# Patient Record
Sex: MALE | Race: WHITE | NOT HISPANIC OR LATINO | Employment: OTHER | URBAN - METROPOLITAN AREA
[De-identification: names, ages, dates, MRNs, and addresses within clinical notes are randomized per-mention and may not be internally consistent; named-entity substitution may affect disease eponyms.]

---

## 2017-01-06 ENCOUNTER — COMMUNICATION - HEALTHEAST (OUTPATIENT)
Dept: FAMILY MEDICINE | Facility: CLINIC | Age: 67
End: 2017-01-06

## 2017-01-06 DIAGNOSIS — E55.9 VITAMIN D DEFICIENCY: ICD-10-CM

## 2017-04-04 ENCOUNTER — RECORDS - HEALTHEAST (OUTPATIENT)
Dept: GENERAL RADIOLOGY | Facility: CLINIC | Age: 67
End: 2017-04-04

## 2017-04-04 ENCOUNTER — OFFICE VISIT - HEALTHEAST (OUTPATIENT)
Dept: FAMILY MEDICINE | Facility: CLINIC | Age: 67
End: 2017-04-04

## 2017-04-04 DIAGNOSIS — Z00.00 ROUTINE GENERAL MEDICAL EXAMINATION AT A HEALTH CARE FACILITY: ICD-10-CM

## 2017-04-04 DIAGNOSIS — E55.9 VITAMIN D DEFICIENCY: ICD-10-CM

## 2017-04-04 DIAGNOSIS — L91.8 SKIN TAG: ICD-10-CM

## 2017-04-04 DIAGNOSIS — Z12.5 ENCOUNTER FOR PROSTATE CANCER SCREENING: ICD-10-CM

## 2017-04-04 DIAGNOSIS — M25.569 PAIN IN UNSPECIFIED KNEE: ICD-10-CM

## 2017-04-04 DIAGNOSIS — M25.569 KNEE JOINT PAIN: ICD-10-CM

## 2017-04-04 LAB
CHOLEST SERPL-MCNC: 205 MG/DL
FASTING STATUS PATIENT QL REPORTED: YES
HDLC SERPL-MCNC: 43 MG/DL
LDLC SERPL CALC-MCNC: 122 MG/DL
PSA SERPL-MCNC: 3.5 NG/ML (ref 0–4.5)
TRIGL SERPL-MCNC: 198 MG/DL

## 2017-04-04 ASSESSMENT — MIFFLIN-ST. JEOR: SCORE: 1848.06

## 2017-04-26 ENCOUNTER — AMBULATORY - HEALTHEAST (OUTPATIENT)
Dept: FAMILY MEDICINE | Facility: CLINIC | Age: 67
End: 2017-04-26

## 2017-04-26 ENCOUNTER — COMMUNICATION - HEALTHEAST (OUTPATIENT)
Dept: FAMILY MEDICINE | Facility: CLINIC | Age: 67
End: 2017-04-26

## 2017-04-26 DIAGNOSIS — E78.5 HYPERLIPIDEMIA, UNSPECIFIED HYPERLIPIDEMIA TYPE: ICD-10-CM

## 2017-04-26 DIAGNOSIS — I10 HTN (HYPERTENSION): ICD-10-CM

## 2017-04-26 DIAGNOSIS — I10 ESSENTIAL HYPERTENSION, BENIGN: ICD-10-CM

## 2017-04-26 DIAGNOSIS — R35.1 NOCTURIA: ICD-10-CM

## 2017-07-31 ENCOUNTER — COMMUNICATION - HEALTHEAST (OUTPATIENT)
Dept: FAMILY MEDICINE | Facility: CLINIC | Age: 67
End: 2017-07-31

## 2017-07-31 DIAGNOSIS — I10 ESSENTIAL HYPERTENSION, BENIGN: ICD-10-CM

## 2018-01-20 ENCOUNTER — COMMUNICATION - HEALTHEAST (OUTPATIENT)
Dept: FAMILY MEDICINE | Facility: CLINIC | Age: 68
End: 2018-01-20

## 2018-01-20 DIAGNOSIS — I10 ESSENTIAL HYPERTENSION, BENIGN: ICD-10-CM

## 2018-02-26 ENCOUNTER — OFFICE VISIT - HEALTHEAST (OUTPATIENT)
Dept: FAMILY MEDICINE | Facility: CLINIC | Age: 68
End: 2018-02-26

## 2018-02-26 DIAGNOSIS — Z13.220 ENCOUNTER FOR SCREENING FOR LIPOID DISORDERS: ICD-10-CM

## 2018-02-26 DIAGNOSIS — Z00.00 ROUTINE GENERAL MEDICAL EXAMINATION AT A HEALTH CARE FACILITY: ICD-10-CM

## 2018-02-26 DIAGNOSIS — R09.82 POST-NASAL DRAINAGE: ICD-10-CM

## 2018-02-26 DIAGNOSIS — E55.9 VITAMIN D DEFICIENCY: ICD-10-CM

## 2018-02-26 DIAGNOSIS — Z23 NEED FOR PROPHYLACTIC VACCINATION AGAINST STREPTOCOCCUS PNEUMONIAE (PNEUMOCOCCUS): ICD-10-CM

## 2018-02-26 DIAGNOSIS — Z12.5 SCREENING FOR MALIGNANT NEOPLASM OF PROSTATE: ICD-10-CM

## 2018-02-26 LAB
ALBUMIN SERPL-MCNC: 3.5 G/DL (ref 3.5–5)
ALP SERPL-CCNC: 72 U/L (ref 45–120)
ALT SERPL W P-5'-P-CCNC: 30 U/L (ref 0–45)
ANION GAP SERPL CALCULATED.3IONS-SCNC: 10 MMOL/L (ref 5–18)
AST SERPL W P-5'-P-CCNC: 20 U/L (ref 0–40)
BILIRUB SERPL-MCNC: 0.8 MG/DL (ref 0–1)
BUN SERPL-MCNC: 24 MG/DL (ref 8–22)
CALCIUM SERPL-MCNC: 8.9 MG/DL (ref 8.5–10.5)
CHLORIDE BLD-SCNC: 108 MMOL/L (ref 98–107)
CHOLEST SERPL-MCNC: 187 MG/DL
CO2 SERPL-SCNC: 22 MMOL/L (ref 22–31)
CREAT SERPL-MCNC: 0.85 MG/DL (ref 0.7–1.3)
FASTING STATUS PATIENT QL REPORTED: YES
GFR SERPL CREATININE-BSD FRML MDRD: >60 ML/MIN/1.73M2
GLUCOSE BLD-MCNC: 103 MG/DL (ref 70–125)
HDLC SERPL-MCNC: 49 MG/DL
LDLC SERPL CALC-MCNC: 109 MG/DL
POTASSIUM BLD-SCNC: 4.6 MMOL/L (ref 3.5–5)
PROT SERPL-MCNC: 6.7 G/DL (ref 6–8)
PSA SERPL-MCNC: 3.6 NG/ML (ref 0–4.5)
SODIUM SERPL-SCNC: 140 MMOL/L (ref 136–145)
TRIGL SERPL-MCNC: 144 MG/DL

## 2018-02-26 ASSESSMENT — MIFFLIN-ST. JEOR: SCORE: 1861.21

## 2018-02-27 LAB
25(OH)D3 SERPL-MCNC: 25 NG/ML (ref 30–80)
25(OH)D3 SERPL-MCNC: 25 NG/ML (ref 30–80)

## 2018-04-11 ENCOUNTER — COMMUNICATION - HEALTHEAST (OUTPATIENT)
Dept: FAMILY MEDICINE | Facility: CLINIC | Age: 68
End: 2018-04-11

## 2018-04-11 DIAGNOSIS — R35.1 NOCTURIA: ICD-10-CM

## 2018-04-11 DIAGNOSIS — E78.5 HYPERLIPIDEMIA, UNSPECIFIED HYPERLIPIDEMIA TYPE: ICD-10-CM

## 2018-07-17 ENCOUNTER — COMMUNICATION - HEALTHEAST (OUTPATIENT)
Dept: FAMILY MEDICINE | Facility: CLINIC | Age: 68
End: 2018-07-17

## 2018-07-17 DIAGNOSIS — I10 ESSENTIAL HYPERTENSION, BENIGN: ICD-10-CM

## 2018-10-15 ENCOUNTER — OFFICE VISIT - HEALTHEAST (OUTPATIENT)
Dept: FAMILY MEDICINE | Facility: CLINIC | Age: 68
End: 2018-10-15
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
## 2018-11-12 ENCOUNTER — RECORDS - HEALTHEAST (OUTPATIENT)
Dept: ADMINISTRATIVE | Facility: OTHER | Age: 68
End: 2018-11-12

## 2018-12-03 ENCOUNTER — COMMUNICATION - HEALTHEAST (OUTPATIENT)
Dept: FAMILY MEDICINE | Facility: CLINIC | Age: 68
End: 2018-12-03

## 2019-01-06 ENCOUNTER — COMMUNICATION - HEALTHEAST (OUTPATIENT)
Dept: FAMILY MEDICINE | Facility: CLINIC | Age: 69
End: 2019-01-06

## 2019-01-06 DIAGNOSIS — I10 ESSENTIAL HYPERTENSION, BENIGN: ICD-10-CM

## 2019-03-25 ENCOUNTER — OFFICE VISIT - HEALTHEAST (OUTPATIENT)
Dept: FAMILY MEDICINE | Facility: CLINIC | Age: 69
End: 2019-03-25

## 2019-03-25 DIAGNOSIS — Z12.5 SCREENING FOR MALIGNANT NEOPLASM OF PROSTATE: ICD-10-CM

## 2019-03-25 DIAGNOSIS — R09.82 POST-NASAL DRAINAGE: ICD-10-CM

## 2019-03-25 DIAGNOSIS — E78.5 HYPERLIPIDEMIA, UNSPECIFIED HYPERLIPIDEMIA TYPE: ICD-10-CM

## 2019-03-25 DIAGNOSIS — R35.1 NOCTURIA: ICD-10-CM

## 2019-03-25 DIAGNOSIS — Z13.220 ENCOUNTER FOR SCREENING FOR LIPOID DISORDERS: ICD-10-CM

## 2019-03-25 DIAGNOSIS — I10 ESSENTIAL HYPERTENSION, BENIGN: ICD-10-CM

## 2019-03-25 DIAGNOSIS — L30.9 ECZEMA: ICD-10-CM

## 2019-03-25 DIAGNOSIS — Z12.11 SCREENING FOR COLON CANCER: ICD-10-CM

## 2019-03-25 DIAGNOSIS — Z23 VACCINE FOR DIPHTHERIA-TETANUS: ICD-10-CM

## 2019-03-25 DIAGNOSIS — Z00.00 ROUTINE GENERAL MEDICAL EXAMINATION AT A HEALTH CARE FACILITY: ICD-10-CM

## 2019-03-25 LAB
ALBUMIN SERPL-MCNC: 3.7 G/DL (ref 3.5–5)
ALP SERPL-CCNC: 74 U/L (ref 45–120)
ALT SERPL W P-5'-P-CCNC: 29 U/L (ref 0–45)
ANION GAP SERPL CALCULATED.3IONS-SCNC: 11 MMOL/L (ref 5–18)
AST SERPL W P-5'-P-CCNC: 20 U/L (ref 0–40)
BILIRUB SERPL-MCNC: 0.7 MG/DL (ref 0–1)
BUN SERPL-MCNC: 17 MG/DL (ref 8–28)
CALCIUM SERPL-MCNC: 9.2 MG/DL (ref 8.5–10.5)
CHLORIDE BLD-SCNC: 108 MMOL/L (ref 98–107)
CHOLEST SERPL-MCNC: 166 MG/DL
CO2 SERPL-SCNC: 24 MMOL/L (ref 22–31)
CREAT SERPL-MCNC: 0.89 MG/DL (ref 0.7–1.3)
FASTING STATUS PATIENT QL REPORTED: YES
GFR SERPL CREATININE-BSD FRML MDRD: >60 ML/MIN/1.73M2
GLUCOSE BLD-MCNC: 100 MG/DL (ref 70–125)
HDLC SERPL-MCNC: 39 MG/DL
LDLC SERPL CALC-MCNC: 100 MG/DL
POTASSIUM BLD-SCNC: 4.3 MMOL/L (ref 3.5–5)
PROT SERPL-MCNC: 7 G/DL (ref 6–8)
PSA SERPL-MCNC: 3.4 NG/ML (ref 0–6.5)
SODIUM SERPL-SCNC: 143 MMOL/L (ref 136–145)
TRIGL SERPL-MCNC: 134 MG/DL

## 2019-03-25 ASSESSMENT — MIFFLIN-ST. JEOR: SCORE: 1885.71

## 2019-04-25 ENCOUNTER — RECORDS - HEALTHEAST (OUTPATIENT)
Dept: ADMINISTRATIVE | Facility: OTHER | Age: 69
End: 2019-04-25

## 2019-09-17 ENCOUNTER — RECORDS - HEALTHEAST (OUTPATIENT)
Dept: GENERAL RADIOLOGY | Facility: CLINIC | Age: 69
End: 2019-09-17

## 2019-09-17 ENCOUNTER — OFFICE VISIT - HEALTHEAST (OUTPATIENT)
Dept: FAMILY MEDICINE | Facility: CLINIC | Age: 69
End: 2019-09-17

## 2019-09-17 DIAGNOSIS — L82.1 SEBORRHEIC KERATOSES: ICD-10-CM

## 2019-09-17 DIAGNOSIS — Z23 NEED FOR INFLUENZA VACCINATION: ICD-10-CM

## 2019-09-17 DIAGNOSIS — M25.561 RIGHT KNEE PAIN, UNSPECIFIED CHRONICITY: ICD-10-CM

## 2019-09-17 DIAGNOSIS — M25.561 PAIN IN RIGHT KNEE: ICD-10-CM

## 2019-09-23 ENCOUNTER — RECORDS - HEALTHEAST (OUTPATIENT)
Dept: ADMINISTRATIVE | Facility: OTHER | Age: 69
End: 2019-09-23

## 2019-10-02 ENCOUNTER — OFFICE VISIT - HEALTHEAST (OUTPATIENT)
Dept: FAMILY MEDICINE | Facility: CLINIC | Age: 69
End: 2019-10-02

## 2019-10-02 DIAGNOSIS — Z01.818 PREOP GENERAL PHYSICAL EXAM: ICD-10-CM

## 2019-10-02 DIAGNOSIS — S83.206D TEAR OF MENISCUS OF RIGHT KNEE AS CURRENT INJURY, UNSPECIFIED MENISCUS, UNSPECIFIED TEAR TYPE, SUBSEQUENT ENCOUNTER: ICD-10-CM

## 2019-10-02 DIAGNOSIS — I10 ESSENTIAL HYPERTENSION, BENIGN: ICD-10-CM

## 2019-10-03 ENCOUNTER — RECORDS - HEALTHEAST (OUTPATIENT)
Dept: ADMINISTRATIVE | Facility: OTHER | Age: 69
End: 2019-10-03

## 2019-10-03 LAB
ATRIAL RATE - MUSE: 68 BPM
DIASTOLIC BLOOD PRESSURE - MUSE: NORMAL
INTERPRETATION ECG - MUSE: NORMAL
P AXIS - MUSE: 23 DEGREES
PR INTERVAL - MUSE: 172 MS
QRS DURATION - MUSE: 140 MS
QT - MUSE: 434 MS
QTC - MUSE: 461 MS
R AXIS - MUSE: -39 DEGREES
SYSTOLIC BLOOD PRESSURE - MUSE: NORMAL
T AXIS - MUSE: 4 DEGREES
VENTRICULAR RATE- MUSE: 68 BPM

## 2019-10-14 ENCOUNTER — RECORDS - HEALTHEAST (OUTPATIENT)
Dept: ADMINISTRATIVE | Facility: OTHER | Age: 69
End: 2019-10-14

## 2019-10-29 ENCOUNTER — RECORDS - HEALTHEAST (OUTPATIENT)
Dept: ADMINISTRATIVE | Facility: OTHER | Age: 69
End: 2019-10-29

## 2019-12-06 ENCOUNTER — RECORDS - HEALTHEAST (OUTPATIENT)
Dept: ADMINISTRATIVE | Facility: OTHER | Age: 69
End: 2019-12-06

## 2020-01-14 ENCOUNTER — RECORDS - HEALTHEAST (OUTPATIENT)
Dept: ADMINISTRATIVE | Facility: OTHER | Age: 70
End: 2020-01-14

## 2020-02-17 ENCOUNTER — RECORDS - HEALTHEAST (OUTPATIENT)
Dept: ADMINISTRATIVE | Facility: OTHER | Age: 70
End: 2020-02-17

## 2020-03-10 ENCOUNTER — COMMUNICATION - HEALTHEAST (OUTPATIENT)
Dept: FAMILY MEDICINE | Facility: CLINIC | Age: 70
End: 2020-03-10

## 2020-03-10 DIAGNOSIS — E78.5 HYPERLIPIDEMIA, UNSPECIFIED HYPERLIPIDEMIA TYPE: ICD-10-CM

## 2020-03-10 DIAGNOSIS — I10 ESSENTIAL HYPERTENSION, BENIGN: ICD-10-CM

## 2020-03-10 DIAGNOSIS — R35.1 NOCTURIA: ICD-10-CM

## 2020-05-10 ENCOUNTER — COMMUNICATION - HEALTHEAST (OUTPATIENT)
Dept: SCHEDULING | Facility: CLINIC | Age: 70
End: 2020-05-10

## 2020-06-01 ENCOUNTER — RECORDS - HEALTHEAST (OUTPATIENT)
Dept: ADMINISTRATIVE | Facility: OTHER | Age: 70
End: 2020-06-01

## 2020-06-25 ENCOUNTER — RECORDS - HEALTHEAST (OUTPATIENT)
Dept: ADMINISTRATIVE | Facility: OTHER | Age: 70
End: 2020-06-25

## 2020-09-18 ENCOUNTER — OFFICE VISIT - HEALTHEAST (OUTPATIENT)
Dept: FAMILY MEDICINE | Facility: CLINIC | Age: 70
End: 2020-09-18

## 2020-09-18 DIAGNOSIS — Z12.5 SCREENING FOR MALIGNANT NEOPLASM OF PROSTATE: ICD-10-CM

## 2020-09-18 DIAGNOSIS — Z00.00 ROUTINE GENERAL MEDICAL EXAMINATION AT A HEALTH CARE FACILITY: ICD-10-CM

## 2020-09-18 DIAGNOSIS — Z13.220 ENCOUNTER FOR SCREENING FOR LIPOID DISORDERS: ICD-10-CM

## 2020-09-18 DIAGNOSIS — E55.9 VITAMIN D DEFICIENCY: ICD-10-CM

## 2020-09-18 DIAGNOSIS — Z23 NEED FOR INFLUENZA VACCINATION: ICD-10-CM

## 2020-09-18 DIAGNOSIS — E66.01 MORBID OBESITY (H): ICD-10-CM

## 2020-09-18 LAB
ALBUMIN SERPL-MCNC: 3.7 G/DL (ref 3.5–5)
ALP SERPL-CCNC: 72 U/L (ref 45–120)
ALT SERPL W P-5'-P-CCNC: 33 U/L (ref 0–45)
ANION GAP SERPL CALCULATED.3IONS-SCNC: 8 MMOL/L (ref 5–18)
AST SERPL W P-5'-P-CCNC: 22 U/L (ref 0–40)
BILIRUB SERPL-MCNC: 0.7 MG/DL (ref 0–1)
BUN SERPL-MCNC: 13 MG/DL (ref 8–28)
CALCIUM SERPL-MCNC: 8.7 MG/DL (ref 8.5–10.5)
CHLORIDE BLD-SCNC: 106 MMOL/L (ref 98–107)
CHOLEST SERPL-MCNC: 179 MG/DL
CO2 SERPL-SCNC: 26 MMOL/L (ref 22–31)
CREAT SERPL-MCNC: 0.82 MG/DL (ref 0.7–1.3)
FASTING STATUS PATIENT QL REPORTED: YES
GFR SERPL CREATININE-BSD FRML MDRD: >60 ML/MIN/1.73M2
GLUCOSE BLD-MCNC: 92 MG/DL (ref 70–125)
HDLC SERPL-MCNC: 45 MG/DL
LDLC SERPL CALC-MCNC: 95 MG/DL
POTASSIUM BLD-SCNC: 4.4 MMOL/L (ref 3.5–5)
PROT SERPL-MCNC: 6.7 G/DL (ref 6–8)
PSA SERPL-MCNC: 3.8 NG/ML (ref 0–6.5)
SODIUM SERPL-SCNC: 140 MMOL/L (ref 136–145)
TRIGL SERPL-MCNC: 196 MG/DL

## 2020-09-18 ASSESSMENT — MIFFLIN-ST. JEOR: SCORE: 1909.52

## 2020-09-21 LAB
25(OH)D3 SERPL-MCNC: 43.5 NG/ML (ref 30–80)
25(OH)D3 SERPL-MCNC: 43.5 NG/ML (ref 30–80)

## 2020-12-07 ENCOUNTER — COMMUNICATION - HEALTHEAST (OUTPATIENT)
Dept: FAMILY MEDICINE | Facility: CLINIC | Age: 70
End: 2020-12-07

## 2020-12-07 DIAGNOSIS — I10 ESSENTIAL HYPERTENSION, BENIGN: ICD-10-CM

## 2020-12-07 DIAGNOSIS — E78.5 HYPERLIPIDEMIA, UNSPECIFIED HYPERLIPIDEMIA TYPE: ICD-10-CM

## 2020-12-07 DIAGNOSIS — R35.1 NOCTURIA: ICD-10-CM

## 2020-12-08 RX ORDER — ATORVASTATIN CALCIUM 40 MG/1
TABLET, FILM COATED ORAL
Qty: 90 TABLET | Refills: 2 | Status: SHIPPED | OUTPATIENT
Start: 2020-12-08 | End: 2021-09-03

## 2020-12-08 RX ORDER — METOPROLOL SUCCINATE 50 MG/1
50 TABLET, EXTENDED RELEASE ORAL DAILY
Qty: 90 TABLET | Refills: 3 | Status: SHIPPED | OUTPATIENT
Start: 2020-12-08 | End: 2021-12-01

## 2020-12-08 RX ORDER — DOXAZOSIN 4 MG/1
TABLET ORAL
Qty: 90 TABLET | Refills: 2 | Status: SHIPPED | OUTPATIENT
Start: 2020-12-08 | End: 2021-09-03

## 2021-04-05 ENCOUNTER — OFFICE VISIT - HEALTHEAST (OUTPATIENT)
Dept: FAMILY MEDICINE | Facility: CLINIC | Age: 71
End: 2021-04-05

## 2021-04-05 DIAGNOSIS — L30.9 ECZEMA, UNSPECIFIED TYPE: ICD-10-CM

## 2021-04-05 DIAGNOSIS — B35.1 DERMATOPHYTOSIS OF NAIL: ICD-10-CM

## 2021-04-05 RX ORDER — BETAMETHASONE VALERATE 1.2 MG/G
CREAM TOPICAL 2 TIMES DAILY
Qty: 45 G | Refills: 1 | Status: SHIPPED | OUTPATIENT
Start: 2021-04-05 | End: 2022-12-08

## 2021-04-05 RX ORDER — CICLOPIROX 80 MG/ML
SOLUTION TOPICAL
Qty: 6.6 ML | Refills: 2 | Status: SHIPPED | OUTPATIENT
Start: 2021-04-05 | End: 2021-07-04

## 2021-05-27 NOTE — PROGRESS NOTES
Assessment and Plan:        1. Routine general medical examination at a health care facility  - Comprehensive Metabolic Panel  Comes placed for routine physical exam.  Discussed continuing with physical activity as well as medication about his diet.  We discussed need to lose weight.  Discussed the various issues that he has.  Labs were ordered and immunization were ordered also.  Did order for colonoscopy since he is due and also having some intermittent rectal bleed due to hemorrhoids.  Discussed ways that he can help to lessen the hemorrhoid.  He will continue to use over-the-counter medication.    2. Encounter for screening for lipoid disorders  - Lipid Cascade, RANDOM    3. Screening for malignant neoplasm of prostate  - PSA (Prostatic-Specific Antigen), Annual Screen  He has had a prior increase in his PSA and has biopsy of the prostate which was normal.  This was in 2014.  Currently having mild nocturia.  We will recheck his PSA and consider referral depending on the results.  4. Hyperlipidemia, unspecified hyperlipidemia type  - atorvastatin (LIPITOR) 40 MG tablet; Take 1 tablet (40 mg total) by mouth at bedtime.  Dispense: 90 tablet; Refill: 3    5. Eczema  - betamethasone valerate (VALISONE) 0.1 % cream; Apply topically 2 (two) times a day.  Dispense: 45 g; Refill: 1    6. Urinary Frequency More Than Twice At Night (Nocturia)  - doxazosin (CARDURA) 4 MG tablet; Take 1 tablet (4 mg total) by mouth at bedtime.  Dispense: 90 tablet; Refill: 3    7. Post-nasal drainage  - fluticasone (FLONASE) 50 mcg/actuation nasal spray; 2 sprays into each nostril daily.  Dispense: 16 g; Refill: 1  - amoxicillin (AMOXIL) 875 MG tablet; Take 1 tablet (875 mg total) by mouth 2 (two) times a day for 10 days.  Dispense: 20 tablet; Refill: 0  He has a runny nose and postnasal drip which is giving him some cough at this time.  This is more than 10 days known think that he does need to have antibiotics management for this time.   Encouraged him to continue to use his Flonase as well as allergy medications.  8. Benign Essential Hypertension  - metoprolol succinate (TOPROL XL) 50 MG 24 hr tablet; Take 1 tablet (50 mg total) by mouth daily.  Dispense: 90 tablet; Refill: 3  Blood pressure is well controlled at this time we will continue with his medications.  9. Screening for colon cancer  - Ambulatory referral for Colonoscopy  Colonoscopy is ordered last colonoscopy done 10 years ago was normal.  He also has some hemorrhoid and the colonoscopy will also evaluate for that.  10. Vaccine for diphtheria-tetanus  - Td, Preservative Free (green label)  Due for tetanus shot which will be updated today.      The patient's current medical problems were reviewed.    I have had an Advance Directives discussion with the patient.  The following high BMI interventions were performed this visit: encouragement to exercise, weight monitoring and prescribed dietary intake  The following health maintenance schedule was reviewed with the patient and provided in printed form in the after visit summary:   Health Maintenance   Topic Date Due     ZOSTER VACCINES (2 of 3) 04/14/2014     HYPERTENSION FOLLOW-UP  10/04/2017     TD 18+ HE  03/04/2018     INFLUENZA VACCINE RULE BASED (1) 08/01/2018     FALL RISK ASSESSMENT  02/26/2019     COLONOSCOPY  09/28/2019     ADVANCE DIRECTIVES DISCUSSED WITH PATIENT  02/26/2023     PNEUMOCOCCAL POLYSACCHARIDE VACCINE AGE 65 AND OVER  Completed     PNEUMOCOCCAL CONJUGATE VACCINE FOR ADULTS (PCV13 OR PREVNAR)  Completed        Subjective:   Chief Complaint: Sharad Yi is an 69 y.o. male here for an Annual Wellness visit.   HPI: Comes in today for his annual wellness visit.  He has remained stable on doing well.  He does exercise intermittently with a lot of walking.  He denies any major concerns but has been having intermittent rectal bleed with hemorrhoids.  This is intermittent and not very bothersome.  He does use a  prescription for that.  He does have an multiple general medical concerns stable at this time this includes hypertension ,hyperlipidemia, he has been obese, has a high PSA he has had a biopsy of the prostate done which was negative.  He does have nocturia which he noted is minimal.  Noted intermittent incontinence very minor.  He does have some increased weight which he is working currently.  He also has some cough as well as sinus congestion and runny nose.  This is making him have some cough that is becoming more pronounced.  He denied any chest pain or shortness of breath.  He does note no wheezing.  Cough is said to be productive and worse at night time.    Review of Systems   Constitutional:Denied any fatigue no fevers no chills.  Has good appetite.  HEENT: Does not have any neck pain.  No difficulty swallowing.  As history.  Respiratory: There is no cough.  No chest wall pain.  Cardiovascular: Denied chest pain shortness of breath or palpitations.  There is no notable lower extremity swelling.    Gastrointestinal: Denies nausea vomiting.  No abdominal pain no diarrhea or constipation.  Endocrine:Has no sensitivity to cold or heat.  Denied undue thirst.   Genitourinary:Has no urinary symptoms, no nocturia.  Musculoskeletal: There is no musculoskeletal pain and swelling.    Skin: He does not have any rashes.   Allergic/Immunologic: Negative.   Neurological: No Numbness  Hematological: Negative.   Psychiatric/Behavioral: No anxiety or depression symptoms.      Please see above.  The rest of the review of systems are negative for all systems.    Patient Care Team:  Jase Rogers MD as PCP - General  Jase Rogers MD     Patient Active Problem List   Diagnosis     Onychomycosis     Obesity     Hyperlipidemia     Benign Essential Hypertension     Urinary Frequency More Than Twice At Night (Nocturia)     Sebaceous Cyst     Kidney cysts     Routine general medical examination at a [facility]  care facility     Elevated PSA     Post-nasal drainage     No past medical history on file.   Past Surgical History:   Procedure Laterality Date     [place] APPENDECTOMY      Description: Appendectomy;  Recorded: 02/05/2013;      Family History   Problem Relation Age of Onset     No Medical Problems Mother      Prostate cancer Father 57     Heart disease Father 57     Diabetes Father      No Medical Problems Sister      No Medical Problems Brother       Social History     Socioeconomic History     Marital status:      Spouse name: Not on file     Number of children: Not on file     Years of education: Not on file     Highest education level: Not on file   Occupational History     Not on file   Social Needs     Financial resource strain: Not on file     Food insecurity:     Worry: Not on file     Inability: Not on file     Transportation needs:     Medical: Not on file     Non-medical: Not on file   Tobacco Use     Smoking status: Former Smoker     Smokeless tobacco: Never Used   Substance and Sexual Activity     Alcohol use: Yes     Alcohol/week: 3.6 oz     Types: 6 Cans of beer per week     Drug use: No     Sexual activity: Yes     Partners: Female   Lifestyle     Physical activity:     Days per week: Not on file     Minutes per session: Not on file     Stress: Not on file   Relationships     Social connections:     Talks on phone: Not on file     Gets together: Not on file     Attends Congregation service: Not on file     Active member of club or organization: Not on file     Attends meetings of clubs or organizations: Not on file     Relationship status: Not on file     Intimate partner violence:     Fear of current or ex partner: Not on file     Emotionally abused: Not on file     Physically abused: Not on file     Forced sexual activity: Not on file   Other Topics Concern     Not on file   Social History Narrative     Not on file      Current Outpatient Medications   Medication Sig Dispense Refill      "atorvastatin (LIPITOR) 40 MG tablet Take 1 tablet (40 mg total) by mouth at bedtime. 90 tablet 3     betamethasone valerate (VALISONE) 0.1 % cream Apply topically 2 (two) times a day. 45 g 0     doxazosin (CARDURA) 4 MG tablet Take 1 tablet (4 mg total) by mouth at bedtime. 90 tablet 3     fluticasone (FLONASE) 50 mcg/actuation nasal spray 2 sprays into each nostril daily. 16 g 1     metoprolol succinate (TOPROL XL) 50 MG 24 hr tablet Take 1 tablet (50 mg total) by mouth daily. 90 tablet 0     No current facility-administered medications for this visit.       Objective:   Vital Signs:   Visit Vitals  /86 (Patient Site: Left Arm, Patient Position: Sitting, Cuff Size: Adult Large)   Pulse 76   Ht 5' 8\" (1.727 m)   Wt (!) 256 lb (116.1 kg)   BMI 38.92 kg/m         VisionScreening:  No exam data present     PHYSICAL EXAM  General Appearance: Alert, cooperative, no distress, appears stated age  Head: Normocephalic, without obvious abnormality, atraumatic  Eyes: PERRL, conjunctiva/corneas clear, EOM's intact  Ears: Normal TM's and external ear canals, both ears  Nose: Nares normal, septum midline,mucosa normal, mild drainage  Throat: Lips, mucosa, and tongue normal; teeth and gums normal, some postnasal drip noted.  Neck: Supple, symmetrical, trachea midline, no adenopathy;  thyroid: not enlarged, symmetric, no tenderness/mass/nodules; no carotid bruit or JVD  Back: Symmetric, no curvature, ROM normal, no CVA tenderness  Lungs: Clear to auscultation bilaterally, respirations unlabored  Heart: Regular rate and rhythm, S1 and S2 normal, no murmur, rub, or gallop,  Abdomen: Soft, non-tender, bowel sounds active all four quadrants,  no masses, no organomegaly  Musculoskeletal: Normal range of motion. No joint swelling or deformity.   Extremities: Extremities normal, atraumatic, no cyanosis or edema  Skin: Skin color, texture, turgor normal, no rashes or lesions  Lymph nodes: Cervical, supraclavicular, and axillary nodes " normal  Neurologic: He is alert. He has normal reflexes.   Psychiatric: He has a normal mood and affect.       Assessment Results 3/25/2019   Activities of Daily Living No help needed   Instrumental Activities of Daily Living No help needed   Mini Cog Total Score 5   Some recent data might be hidden     A Mini-Cog score of 0-2 suggests the possibility of dementia, score of 3-5 suggests no dementia    Identified Health Risks:     The patient was counseled and encouraged to consider modifying their diet and eating habits. He was provided with information on recommended healthy diet options.  Information on urinary incontinence and treatment options given to patient.  Information regarding advance directives (living [place]), including where he can download the appropriate form, was provided to the patient via the AVS.

## 2021-05-30 VITALS — WEIGHT: 247.7 LBS | BODY MASS INDEX: 37.54 KG/M2 | HEIGHT: 68 IN

## 2021-05-31 VITALS — BODY MASS INDEX: 37.98 KG/M2 | HEIGHT: 68 IN | WEIGHT: 250.6 LBS

## 2021-06-01 VITALS — BODY MASS INDEX: 39.65 KG/M2 | WEIGHT: 260.8 LBS

## 2021-06-01 NOTE — PROGRESS NOTES
Assessment/Plan:        Diagnoses and all orders for this visit:    Right knee pain, unspecified chronicity  -     XR Knee Right Plus [place]rise VW; Future; Expected date: 09/17/2019  -     Ambulatory referral to Orthopedics    Need for influenza vaccination  -     Influenza High Dose, Seasonal 65+ yrs    Seborrheic keratoses  I did order for an x-ray to compare it with the previous x-ray done for the same right knee in 2017 and which was reported at that time to show some arthritis.  Though the area that he was concerned about is lower than the knee joint I think that it is possible that he might be having some soft tissue injury or having the arthritis.  It is also possible that he is having tendinitis around the area that he is concerned about.  I did also refer him to see the orthopedic doctor for further evaluation and management.  I think that the concern for skin that he has is a seborrhea and he will just watch it for now.  We will have him follow-up if not improved.        Subjective:    Patient ID: Sharad Yi is a 69 y.o. male.    Knee Pain    The incident occurred more than 1 week ago (has been going for more than 5 months. Had a different pain to same knee about a year ago. Managed conservatively.). There was no injury mechanism. The pain is present in the right knee (pain noted on the anterior lower knee/ upper ). The quality of the pain is described as aching and shooting. The pain is moderate. The pain has been worsening since onset. Pertinent negatives include no muscle weakness, numbness or tingling. Associated symptoms comments: Able to bear weight but painful especially going up and downstairs.. He reports no foreign bodies present. The symptoms are aggravated by movement and weight bearing. Treatments tried: saw a chiropractor and has been doing manipulations which helped the posterior pain.   He also has skin concerns noted on the right cheek.  Had noted a discoloration there which is not  itchy or painful.  He does not think that he has seen that in the past and wanted to have it checked.    The following portions of the patient's history were reviewed and updated as appropriate: allergies, current medications, past family history, past medical history, past social history, past surgical history and problem list.    Review of Systems   Constitutional: Negative.    HENT: Negative.    Cardiovascular: Negative for leg swelling.   Musculoskeletal: Negative for back pain, gait problem and joint swelling.   Skin: Positive for color change. Negative for rash.   Neurological: Negative for tingling, numbness and headaches.     Vitals:    09/17/19 0906   BP: 132/86   Pulse: 67   SpO2: 95%   Weight: (!) 258 lb (117 kg)           Objective:    Physical Exam   Constitutional: He appears well-developed and well-nourished. No distress.   HENT:   Head: Normocephalic.   Neck: Normal range of motion.   Cardiovascular: Intact distal pulses.   Pulmonary/Chest: Effort normal.   Musculoskeletal:        Right knee: He exhibits normal range of motion, no swelling and no bony tenderness. No tenderness found.   The examination was essentially he was not noting pain inferior to the knee joint space by about 4 cm which was nontender.   Neurological: He is alert.   Skin:   He has a small skin lesion noted on the lower right cheek which is a consistent with a seborrheic keratosis.  Also noted one superior to the lower one.

## 2021-06-01 NOTE — PROGRESS NOTES
Preoperative Exam    Scheduled Procedure:Right knee Meniscus Repair  Surgery Date:  10/03   Surgery Location: [facility], fax [phone]    Surgeon:      Assessment/Plan:      1. Preop general physical exam  - Electrocardiogram Perform and Read    2. Tear of meniscus of right knee as current injury, unspecified meniscus, unspecified tear type, subsequent encounter    3. Benign Essential Hypertension  Controlled and stable on medications        Surgical Procedure Risk: Low (reported cardiac risk generally < 1%)  Have you had prior anesthesia?: Yes  Have you or any family members had a previous anesthesia reaction:  No  Do you or any family members have a history of a clotting or bleeding disorder?: No  Cardiac Risk Assessment: no increased risk for major cardiac complications, has history of Hypertension that is controlled on medication.  Has no history of cardiovascular disease.  He does have more than 4 metabolic equivalents.  EKG done today was normal sinus rhythm.    APPROVAL GIVEN to proceed with proposed procedure, without further diagnostic evaluation      Functional Status: Independent  Patient plans to recover at home with family.     Subjective:      Sharad Yi is a 69 y.o. male who presents for a preoperative consultation.   He has a right knee meniscal tear that has been evaluated and needs to be repaired.  He is continued to have some pain due to the tear.  He is prepped to have surgery to clean out the knee.  He has had surgeries in the past with no complications.  Had appendectomy with no complications.  He does have hypertension which is being managed as well as hyperlipidemia.  He otherwise is feeling well.      Review of Systems   Constitutional:Denied any fatigue no fevers no chills.  Has good appetite.  HEENT: Does not have any neck pain.  No difficulty swallowing denies having any postnasal drips.    Respiratory: There is no cough.  No chest wall  pain.  Cardiovascular: Denied chest pain shortness of breath or palpitations.  There is no notable lower extremity swelling.    Gastrointestinal: Denies nausea vomiting.  No abdominal pain no diarrhea or constipation.  Endocrine:Has no sensitivity to cold or heat.  Denied undue thirst.   Genitourinary:Has no urinary symptoms, no nocturia.  Musculoskeletal: There is musculoskeletal pain on the right knee and swelling in the right lower extremity.    Skin: He does not have any rashes.  But he does have lesions of perioral dermatitis.  Allergic/Immunologic: Negative.   Neurological: No Numbness  Hematological: Negative.   Psychiatric/Behavioral: No anxiety or depression symptoms.    All other systems reviewed and are negative, other than those listed in the HPI.    Pertinent History  Do you have difficulty breathing or chest pain after walking up a flight of stairs: No  History of obstructive sleep apnea: No  Steroid use in the last 6 months: No  Frequent Aspirin/NSAID use: Yes: IBU on and off   Prior Blood Transfusion: No  Prior Blood Transfusion Reaction: No  If for some reason prior to, during or after the procedure, if it is medically indicated, would you be willing to have a blood transfusion?:  There is no transfusion refusal.    Current Outpatient Medications   Medication Sig Dispense Refill     atorvastatin (LIPITOR) 40 MG tablet Take 1 tablet (40 mg total) by mouth at bedtime. 90 tablet 3     betamethasone valerate (VALISONE) 0.1 % cream Apply topically 2 (two) times a day. 45 g 1     doxazosin (CARDURA) 4 MG tablet Take 1 tablet (4 mg total) by mouth at bedtime. 90 tablet 3     fluticasone (FLONASE) 50 mcg/actuation nasal spray 2 sprays into each nostril daily. 16 g 1     meloxicam (MOBIC) 15 MG tablet TK 1 T PO D WF  0     metoprolol succinate (TOPROL XL) 50 MG 24 hr tablet Take 1 tablet (50 mg total) by mouth daily. 90 tablet 3     No current facility-administered medications for this visit.         No  Known Allergies    Patient Active Problem List   Diagnosis     Onychomycosis     Obesity     Hyperlipidemia     Benign Essential Hypertension     Urinary Frequency More Than Twice At Night (Nocturia)     Sebaceous Cyst     Kidney cysts     Routine general medical examination at a health care facility     Elevated PSA     Post-nasal drainage       No past medical history on file.    Past Surgical History:   Procedure Laterality Date     [place] APPENDECTOMY      Description: Appendectomy;  Recorded: 02/05/2013;       Social History     Socioeconomic History     Marital status:      Spouse name: Not on file     Number of children: Not on file     Years of education: Not on file     Highest education level: Not on file   Occupational History     Not on file   Social Needs     Financial resource strain: Not on file     Food insecurity:     Worry: Not on file     Inability: Not on file     Transportation needs:     Medical: Not on file     Non-medical: Not on file   Tobacco Use     Smoking status: Former Smoker     Smokeless tobacco: Never Used   Substance and Sexual Activity     Alcohol use: Yes     Alcohol/week: 6.0 standard drinks     Types: 6 Cans of beer per week     Drug use: No     Sexual activity: Yes     Partners: Female   Lifestyle     Physical activity:     Days per week: Not on file     Minutes per session: Not on file     Stress: Not on file   Relationships     Social connections:     Talks on phone: Not on file     Gets together: Not on file     Attends [employer] service: Not on file     Active member of club or organization: Not on file     Attends meetings of clubs or organizations: Not on file     Relationship status: Not on file     Intimate partner violence:     Fear of current or ex partner: Not on file     Emotionally abused: Not on file     Physically abused: Not on file     Forced sexual activity: Not on file   Other Topics Concern     Not on file   Social History Narrative     Not on file        Patient Care Team:  Jase Rogers MD as PCP - General  Jase Rogers MD Anene, Ositadinma [place], MD as Assigned PCP          Objective:     Vitals:    10/02/19 1040   BP: 132/84   Pulse: 70   Resp: 22   SpO2: 97%   Weight: (!) 250 lb (113.4 kg)       Physical Exam:  General Appearance: Alert, cooperative, no distress, appears stated age mildly obese.  Head: Normocephalic, without obvious abnormality, atraumatic  Eyes: PERRL, conjunctiva/corneas clear, EOM's intact  Ears: Normal TM's and external ear canals, both ears  Nose: Nares normal, septum midline,mucosa normal, no drainage  Throat: Lips, mucosa, and tongue normal; teeth and gums normal  Neck: Supple, symmetrical, trachea midline, no adenopathy;  thyroid: not enlarged, symmetric, no tenderness.  Back: Symmetric, no curvature, ROM normal, no CVA tenderness  Lungs: Clear to auscultation bilaterally, respirations unlabored  Heart: Regular rate and rhythm, S1 and S2 normal, no murmur, rub, or gallop,  Abdomen: Soft, non-tender, bowel sounds active all four quadrants,  no masses, no organomegaly  Musculoskeletal: Normal range of motion.  Mild swelling noted on the right knee with mild discomfort on palpation.   Extremities: Extremities normal, atraumatic.  Skin: Skin color, texture, turgor normal, no rashes , has perioral dermatitis noted.  Lymph nodes: Cervical, supraclavicular, and axillary nodes normal  Neurologic: He is alert. He has normal reflexes.   Psychiatric: He has a normal mood and affect.       Labs:  Recent Results (from the past 24 hour(s))   Electrocardiogram Perform and Read    Collection Time: 10/02/19 11:09 AM   Result Value Ref Range    SYSTOLIC BLOOD PRESSURE      DIASTOLIC BLOOD PRESSURE      VENTRICULAR RATE 68 BPM    ATRIAL RATE 68 BPM    P-R INTERVAL 172 ms    QRS DURATION 140 ms    Q-T INTERVAL 434 ms    QTC CALCULATION (BEZET) 461 ms    P Axis 23 degrees    R AXIS -39 degrees    T AXIS 4 degrees     MUSE DIAGNOSIS       Normal sinus rhythm  Left axis deviation  Right bundle branch block  Abnormal ECG  No previous ECGs available         Immunization History   Administered Date(s) Administered     Influenza high dose,seasonal,PF, 65+ yrs 10/13/2016, 10/09/2017, 09/17/2019     Influenza,seasonal,quad inj =/> 6months 10/06/2015     Pneumo Conj 13-V (2010&after) 10/13/2016     Pneumo Polysac 23-V 02/26/2018     Td, adult adsorbed, PF 03/25/2019     Tdap 03/04/2008     ZOSTER, LIVE 02/17/2014           Electronically signed by Jase Rogers MD 10/02/19 10:39 AM

## 2021-06-02 VITALS — HEIGHT: 68 IN | BODY MASS INDEX: 38.8 KG/M2 | WEIGHT: 256 LBS

## 2021-06-03 VITALS
DIASTOLIC BLOOD PRESSURE: 86 MMHG | BODY MASS INDEX: 39.23 KG/M2 | OXYGEN SATURATION: 95 % | SYSTOLIC BLOOD PRESSURE: 132 MMHG | HEART RATE: 67 BPM | WEIGHT: 258 LBS

## 2021-06-03 VITALS
SYSTOLIC BLOOD PRESSURE: 132 MMHG | OXYGEN SATURATION: 97 % | RESPIRATION RATE: 22 BRPM | BODY MASS INDEX: 38.01 KG/M2 | HEART RATE: 70 BPM | WEIGHT: 250 LBS | DIASTOLIC BLOOD PRESSURE: 84 MMHG

## 2021-06-04 VITALS
SYSTOLIC BLOOD PRESSURE: 138 MMHG | HEIGHT: 68 IN | HEART RATE: 72 BPM | DIASTOLIC BLOOD PRESSURE: 86 MMHG | BODY MASS INDEX: 39.86 KG/M2 | TEMPERATURE: 98 F | WEIGHT: 263 LBS

## 2021-06-05 VITALS
WEIGHT: 260.9 LBS | HEART RATE: 72 BPM | DIASTOLIC BLOOD PRESSURE: 80 MMHG | SYSTOLIC BLOOD PRESSURE: 120 MMHG | BODY MASS INDEX: 40.26 KG/M2 | OXYGEN SATURATION: 96 %

## 2021-06-06 NOTE — TELEPHONE ENCOUNTER
Refill Approved    Rx renewed per Medication Renewal Policy. Medication was last renewed on 3/25/19.    Claudia Justice, [facility] Triage/Med Refill 3/12/2020     Requested Prescriptions   Pending Prescriptions Disp Refills     doxazosin (CARDURA) 4 MG tablet [Pharmacy Med Name: DOXAZOSIN 4MG TABLETS] 90 tablet 3     Sig: TAKE 1 TABLET(4 MG) BY MOUTH AT BEDTIME       Alpha Blockers Refill Protocol  Passed - 3/10/2020  5:00 AM        Passed - PCP or prescribing provider visit in past 12 months       Last office visit with prescriber/PCP: 9/17/2019 Jase Rogers MD OR same dept: 9/17/2019 Jase Rogers MD OR same specialty: 9/17/2019 Jase Rogers MD  Last physical: 10/2/2019 Last MTM visit: Visit date not found   Next visit within 3 mo: Visit date not found  Next physical within 3 mo: Visit date not found  Prescriber OR PCP: Jase Rogers MD  Last diagnosis associated with med order: 1. Urinary Frequency More Than Twice At Night (Nocturia)  - doxazosin (CARDURA) 4 MG tablet [Pharmacy Med Name: DOXAZOSIN 4MG TABLETS]; TAKE 1 TABLET(4 MG) BY MOUTH AT BEDTIME  Dispense: 90 tablet; Refill: 3    2. Hyperlipidemia, unspecified hyperlipidemia type  - atorvastatin (LIPITOR) 40 MG tablet [Pharmacy Med Name: ATORVASTATIN 40MG TABLETS]; TAKE 1 TABLET(40 MG) BY MOUTH AT BEDTIME  Dispense: 90 tablet; Refill: 3    3. Benign Essential Hypertension  - metoprolol succinate (TOPROL-XL) 50 MG 24 hr tablet [Pharmacy Med Name: METOPROLOL ER SUCCINATE 50MG TABS]; TAKE 1 TABLET(50 MG) BY MOUTH DAILY  Dispense: 90 tablet; Refill: 3    If protocol passes may refill for 12 months if within 3 months of last provider visit (or a total of 15 months).             Passed - Blood pressure filed in past 12 months     BP Readings from Last 1 Encounters:   10/02/19 132/84                atorvastatin (LIPITOR) 40 MG tablet [Pharmacy Med Name: ATORVASTATIN 40MG TABLETS] 90 tablet 3     Sig:  TAKE 1 TABLET(40 MG) BY MOUTH AT BEDTIME       Statins Refill Protocol (Hmg CoA Reductase Inhibitors) Passed - 3/10/2020  5:00 AM        Passed - PCP or prescribing provider visit in past 12 months      Last office visit with prescriber/PCP: 9/17/2019 Jase Rogers MD OR same dept: 9/17/2019 Jase Rogers MD OR same specialty: 9/17/2019 Jase Rogers MD  Last physical: 10/2/2019 Last MTM visit: Visit date not found   Next visit within 3 mo: Visit date not found  Next physical within 3 mo: Visit date not found  Prescriber OR PCP: Jase Rogers MD  Last diagnosis associated with med order: 1. Urinary Frequency More Than Twice At Night (Nocturia)  - doxazosin (CARDURA) 4 MG tablet [Pharmacy Med Name: DOXAZOSIN 4MG TABLETS]; TAKE 1 TABLET(4 MG) BY MOUTH AT BEDTIME  Dispense: 90 tablet; Refill: 3    2. Hyperlipidemia, unspecified hyperlipidemia type  - atorvastatin (LIPITOR) 40 MG tablet [Pharmacy Med Name: ATORVASTATIN 40MG TABLETS]; TAKE 1 TABLET(40 MG) BY MOUTH AT BEDTIME  Dispense: 90 tablet; Refill: 3    3. Benign Essential Hypertension  - metoprolol succinate (TOPROL-XL) 50 MG 24 hr tablet [Pharmacy Med Name: METOPROLOL ER SUCCINATE 50MG TABS]; TAKE 1 TABLET(50 MG) BY MOUTH DAILY  Dispense: 90 tablet; Refill: 3    If protocol passes may refill for 12 months if within 3 months of last provider visit (or a total of 15 months).                metoprolol succinate (TOPROL-XL) 50 MG 24 hr tablet [Pharmacy Med Name: METOPROLOL ER SUCCINATE 50MG TABS] 90 tablet 3     Sig: TAKE 1 TABLET(50 MG) BY MOUTH DAILY       Beta-Blockers Refill Protocol Passed - 3/10/2020  5:00 AM        Passed - PCP or prescribing provider visit in past 12 months or next 3 months     Last office visit with prescriber/PCP: 9/17/2019 Jase Rogers MD OR same dept: 9/17/2019 Jase Rogers MD OR same specialty: 9/17/2019 Jase Rogers MD  Last  physical: 10/2/2019 Last MTM visit: Visit date not found   Next visit within 3 mo: Visit date not found  Next physical within 3 mo: Visit date not found  Prescriber OR PCP: Jase Roegrs MD  Last diagnosis associated with med order: 1. Urinary Frequency More Than Twice At Night (Nocturia)  - doxazosin (CARDURA) 4 MG tablet [Pharmacy Med Name: DOXAZOSIN 4MG TABLETS]; TAKE 1 TABLET(4 MG) BY MOUTH AT BEDTIME  Dispense: 90 tablet; Refill: 3    2. Hyperlipidemia, unspecified hyperlipidemia type  - atorvastatin (LIPITOR) 40 MG tablet [Pharmacy Med Name: ATORVASTATIN 40MG TABLETS]; TAKE 1 TABLET(40 MG) BY MOUTH AT BEDTIME  Dispense: 90 tablet; Refill: 3    3. Benign Essential Hypertension  - metoprolol succinate (TOPROL-XL) 50 MG 24 hr tablet [Pharmacy Med Name: METOPROLOL ER SUCCINATE 50MG TABS]; TAKE 1 TABLET(50 MG) BY MOUTH DAILY  Dispense: 90 tablet; Refill: 3    If protocol passes may refill for 12 months if within 3 months of last provider visit (or a total of 15 months).             Passed - Blood pressure filed in past 12 months     BP Readings from Last 1 Encounters:   10/02/19 132/84

## 2021-06-08 NOTE — TELEPHONE ENCOUNTER
RN Triage:    Patient calling with c/o:  Hearing aid is stuck in his ear    Patient states he tried taking his hearing aid out and a piece of it got stuck in his ear canal.  States he attempted to get it out at home but has not been successful.    Denies pain    Patient states it is slightly uncomfortable and he would prefer to get it taken out tonight.    PLAN:  Provided patient with information on nearest urgent care. Patient verbalized understanding and is to call back if any other questions arise.      Cici Mcknight RN        Reason for Disposition    [1] Foreign body AND [2] can't remove at home    [facility] Specific Disposition  - REQUIRED: [facility] Specific Patient Instructions  COVID 19 Nurse Triage Plan/Patient Instructions    Please be aware that novel coronavirus (COVID-19) may be circulating in the community. If you develop symptoms such as fever, cough, or SOB or if you have concerns about the presence of another infection including coronavirus (COVID-19), please contact your health care provider or visit [email or web address].       Additional COVID19 information to add for patients.       Additional General Information About COVID-19    Whether or not you've been tested for COVID-19    Stay home and away from others (self-isolate) until:  At least 10 days have passed since your symptoms started. And   You've had no fever--and no medicine that reduces fever--for 3 full days (72 hours). And    Your other symptoms have resolved (gotten better).     During this time:  Stay in your own room (and use your own bathroom), if you can.  Stay away from others in your home. No hugging, kissing or shaking hands.  No visitors.  Don't go to work, school or anywhere else.   Clean  high touch  surfaces often (doorknobs, counters, handles, etc.). Use a household cleaning spray or wipes.  Cover your mouth and nose with a mask, tissue or wash cloth to avoid spreading germs.  Wash your hands and face often. Use  soap and water.  For more tips, go to https://www.cdc.gov/coronavirus/2019-ncov/downloads/10Things.pdf.    How can I take care of myself?    Get lots of rest. Drink extra fluids (unless a doctor has told you not to).     Take Tylenol (acetaminophen) for fever or pain. If you have liver or kidney problems, ask your family doctor if it's okay to take Tylenol.     Adults can take either:   650 mg (two 325 mg pills) every 4 to 6 hours, or   1,000 mg (two 500 mg pills) every 8 hours as needed.   Note: Don't take more than 3,000 mg in one day.   Acetaminophen is found in many medicines (both prescribed and over-the-counter medicines). Read all labels to be sure you don't take too much.   For children, check the Tylenol bottle for the right dose. The dose is based on  the child's age or weight.    If you have other health problems (like cancer, heart failure, an organ transplant or severe kidney disease): Call your specialty clinic if you don't feel better in the next 2 days.    Know when to call 911: If your breathing is so bad that it keeps you from doing normal activities, call 911 or go to the emergency room. Tell them that you've been staying home and may have COVID-19..      What are the symptoms of COVID-19?   The most common symptoms are cough, fever and trouble breathing.   Less common symptoms include body aches, chills, diarrhea (loose, watery poops), fatigue (feeling very tired), headache, runny nose, sore throat and loss of smell.   COVID-19 can cause severe coughing (bronchitis) and lung infection (pneumonia).    How does it spread?   The virus may spread when a person coughs or sneezes into the air. The virus can travel about 6 feet this way, and it can live on surfaces.    Common  (household disinfectants) will kill the virus.    Who is at risk?  Anyone can catch COVID-19 if they're around someone who has the virus.    How can others protect themselves?   Stay away from people who have COVID-19 (or  symptoms of COVID-19).  Wash hands often with soap and water. Or, use hand  with at least 60% alcohol.  Avoid touching the eyes, nose or mouth.   Wear a face mask when you go out in public, when sick or when caring for a sick person.      For more about COVID-19 and caring for yourself at home, please visit the CDC website at https://www.cdc.gov/coronavirus/2019-ncov/about/steps-when-sick.html.     To learn about care at [facility], go to [email or web address].    Below are the COVID-19 hotlines at the [place] [facility] ([facility]). Interpreters are available.   For health questions: Call 906-560-4562 or 1-569.222.3390 (7 a.m. to 7 p.m.)  For questions about schools and childcare: Call 192-224-6410 or 1-867.566.6942 (7 a.m. to 7 p.m.)        Thank you for limiting contact with others, wearing a simple mask to cover your cough, practice good hand hygiene habits and accessing our virtual services where possible to limit the spread of this virus.    For more information about COVID19 and options for caring for yourself at home, please visit the CDC website at https://www.cdc.gov/coronavirus/2019-ncov/about/steps-when-sick.html  For more options for care at [facility], please visit our website at [email or web address]    For more information, please use the [place] [facility] ([facility]) COVID-19 Hotlines (Interpreters available):   Health questions: Phone Number: 331.825.9640 or 1-514.671.9621 and Hours: 7 a.m. to 7 p.m.  Schools and  questions: Phone Number: [phone] or [phone] and Hours 7 a.m. to 7 p.m.    Protocols used: EAR - FOREIGN BODY-A-AH, CORONAVIRUS (COVID-19) EXPOSURE-A-AH 4.22.20

## 2021-06-09 NOTE — PROGRESS NOTES
Assessment:      Healthy male exam.      1. Routine general medical examination at a health care facility  - Lipid Cascade  - Comprehensive Metabolic Panel    2. Encounter for prostate cancer screening  - PSA (Prostatic-Specific Antigen), Annual Screen    3. Vitamin D deficiency  - Vitamin D, Total (25-Hydroxy)    4. Knee joint pain  - XR Knee Right Plus Tunnel VW; Future    5. Skin tag    Plan:      1.  For counseling done for cardiovascular related screening as well as cancer related screening.  He is up-to-date with cancer screening at this point has had his colonoscopy, he checks his testicles, and also checks his skin.  We also discussed physical activity as well as dietary changes to help with weight loss as well as help with cardio vascular prevention and high cholesterol.  For the knee joint pain I will get an x-ray for first evaluation did talk to him about exercises that he can do at home or at the gym to help with the knee.  If after a month or 2 there is no improvement also depending on the radiologist report of the x-ray will consider physical therapy referral as the case may be.  He will come back in for the skin tag if it is giving him any concern.  2. Patient Counseling:  --Nutrition: Stressed importance of moderation in sodium/caffeine intake, saturated fat and cholesterol, caloric balance, sufficient intake of fresh fruits, vegetables, fiber.  --Exercise: Stressed the importance of regular exercise.  This was discussed, with regards to cardiovascular disease prevention as well as prevention of diabetes mellitus, hypertension and hyperlipidemia.  --Consistent screen was also discussed.  Discussion based specifically on patient's age.  --Questions regards to patient's health maintenance were answered, and full counseling done without regards.     --Immunizations reviewed.  --After hours service discussed with patient    3. Discussed the patient's BMI with him.  The BMI is above average; BMI  management plan is completed  4. Follow up as needed for acute illness   5.I have had an Advance Directives discussion with the patient.  The following high BMI interventions were performed this visit: encouragement to exercise, weight monitoring and lifestyle education regarding diet  Subjective:       Sharad Yi is a 67 y.o. male and is here for a comprehensive physical exam.  He had a full physical exam last year.  He had a history of abnormal PSA levels for which he has had a prostate biopsy and currently we are watching his PSA.  He otherwise thinks that his feeling well.    The patient reports Bilateral knee pain which has been going on for the past several years.  This appears to have been getting worse recently..  He notes the right is worse than the left, pain is described as achy around the lower femoral area just above the knee joint.  He denies any swellings and does not have any prior related injuries.  Does not hear any clicks or has any feeling of weakness.  He also has a skin tag but he will want to look at that.  Do you take any herbs or supplements that were not prescribed by a doctor? no  Are you taking aspirin daily? no    No past medical history on file.  Past Surgical History:   Procedure Laterality Date     RI APPENDECTOMY      Description: Appendectomy;  Recorded: 02/05/2013;     Social History     Social History     Marital status:      Spouse name: N/A     Number of children: N/A     Years of education: N/A     Social History Main Topics     Smoking status: Former Smoker     Smokeless tobacco: Never Used     Alcohol use 3.6 oz/week     6 Cans of beer per week     Drug use: No     Sexual activity: Yes     Partners: Female     Other Topics Concern     None     Social History Narrative     Family History   Problem Relation Age of Onset     No Medical Problems Mother      Prostate cancer Father 57     Heart disease Father 57     Diabetes Father      No Medical Problems Sister       "No Medical Problems Brother      No Known Allergies  Current Outpatient Prescriptions   Medication Sig Dispense Refill     atenolol (TENORMIN) 50 MG tablet Take 1 tablet (50 mg total) by mouth daily. 90 tablet 2     betamethasone valerate (VALISONE) 0.1 % cream Apply topically 2 (two) times a day. 45 g 0     doxazosin (CARDURA) 4 MG tablet Take 1 tablet (4 mg total) by mouth bedtime. 90 tablet 2     simvastatin (ZOCOR) 40 MG tablet Take 1 tablet (40 mg total) by mouth daily. 90 tablet 2     ergocalciferol (VITAMIN D2) 50,000 unit capsule Take 1 capsule (50,000 Units total) by mouth once a week. 6 capsule 0     No current facility-administered medications for this visit.          Review of Systems  Do you have pain that bothers you in your daily life? yes as noted in the HPI.    Review of Systems   Constitutional:Denied any fatigue no fevers no chills.  Has good appetite.  HEENT: Does not have any neck pain.  No difficulty swallowing, he does snore but noted he work up years in the past which was negative.  Respiratory: There is no cough.  No chest wall pain.  Cardiovascular: Denied chest pain shortness of breath or palpitations.  There is no notable lower extremity swelling.    Gastrointestinal: Denies nausea vomiting.  No abdominal pain no diarrhea or constipation.  Endocrine:Has no sensitivity to cold or heat.  Denied undue thirst.   Genitourinary:Has no urinary symptoms, no nocturia.  Musculoskeletal: There is  musculoskeletal pain but no swelling .  Skin: He does not have any rashes.   Allergic/Immunologic: Negative.   Neurological: No Numbness  Hematological: Negative.   Psychiatric/Behavioral: No anxiety or depression symptoms.        Objective:     Vitals:    04/04/17 0800   BP: 100/60   Pulse: 64   Weight: (!) 247 lb 11.2 oz (112.4 kg)   Height: 5' 8\" (1.727 m)     Physical Exam:  General Appearance: Alert, cooperative, no distress, appears stated age  Head: Normocephalic, without obvious abnormality, " atraumatic  Eyes: PERRL, conjunctiva/corneas clear, EOM's intact  Ears: Normal TM's and external ear canals, both ears  Nose: Nares normal, septum midline,mucosa normal, no drainage  Throat: Lips, mucosa, and tongue normal; teeth and gums normal  Neck: Supple, symmetrical, trachea midline, no adenopathy;  thyroid: not enlarged, symmetric.  Back: Symmetric, no curvature, ROM normal, no CVA tenderness  Lungs: Clear to auscultation bilaterally, respirations unlabored  Heart: Regular rate and rhythm, S1 and S2 normal, no murmur, rub, or gallop,  Abdomen: Soft, non-tender, bowel sounds active all four quadrants,  no masses, no organomegaly but obese.  Genitourinary: Deferred patient does get prostate exam with urologists.  Musculoskeletal: Normal range of motion. No joint swelling or deformity.  mild tenderness on the medial lower thigh just superior to the joint space.  No effusion and no tenderness.  Extremities: Extremities normal, atraumatic, no cyanosis or edema  Skin: Skin color, texture, turgor normal, no rashes .  Skin tag noted on the right upper inner thigh was the anterior aspect   .  lymph nodes: Cervical, supraclavicular, and axillary nodes normal  Neurologic:  Alert and oriented times 3. He has normal reflexes.   Psychiatric: He has a normal mood and affect.

## 2021-06-11 NOTE — PROGRESS NOTES
Assessment and Plan:     Patient has been advised of split billing requirements and indicates understanding: Yes  1. Routine general medical examination at a health care facility  - Comprehensive Metabolic Panel    2. Encounter for screening for lipoid disorders  - Lipid Cascade, RANDOM    3. Screening for malignant neoplasm of prostate  - PSA (Prostatic-Specific Antigen), Annual Screen    4. Morbid obesity (H)    5. Need for influenza vaccination  - Influenza,Quad,High Dose,PF 65 YR+    6. Vitamin D deficiency  - Vitamin D, Total (25-Hydroxy)    He is a doing well regarding his chronic medical concerns.  I reviewed his lab results from his previous physical exams.  We will get labs today and will inform him of the report.  Discussed health maintenance, mental alertness as well as memory any physical activity.  He is a doing well regarding all.  He continues to golf.  Has intermittent right knee pain from arthritis but feels that he is doing well regarding that.  He will update his flu shot today.    The patient's current medical problems were reviewed.    I have had an Advance Directives discussion with the patient.  The following high BMI interventions were performed this visit: encouragement to exercise, weight monitoring and prescribed dietary intake  The following health maintenance schedule was reviewed with the patient and provided in printed form in the after visit summary:   Health Maintenance   Topic Date Due     HEPATITIS C SCREENING  1950     ZOSTER VACCINES (2 of 3) 04/14/2014     HYPERTENSION FOLLOW-UP  10/04/2017     MEDICARE ANNUAL WELLNESS VISIT  03/25/2020     FALL RISK ASSESSMENT  03/25/2020     INFLUENZA VACCINE RULE BASED (1) 08/01/2020     LIPID  03/25/2024     ADVANCE CARE PLANNING  03/25/2024     TD 18+ HE  03/25/2029     COLORECTAL CANCER SCREENING  04/25/2029     PNEUMOCOCCAL IMMUNIZATION 65+ LOW/MEDIUM RISK  Completed     HEPATITIS B VACCINES  Aged Out        Subjective:   Chief  Complaint: Sharad Yi is an 70 y.o. male here for an Annual Wellness visit.   HPI: 70 years old male who is here for his routine annual wellness visit.  His last visit was about 2 years ago.  He noted not having any specific changes in his health history.  Noted that he does continue to be physically active and he continues to golf.  Noted no changes in his diet.  He is no concern regarding any mental or memory issues.  He does have some knee arthritis from the right knee.  That does not prevent him from being active as he can and at this point is not concerning for him.  He otherwise is feeling well and feels that he is doing very well.    Review of Systems:    Constitutional:Denied any fatigue no fevers no chills.  Has good appetite.  HEENT: Does not have any neck pain.  No difficulty swallowing denies having any postnasal drips.  He thinks he snores but does not have any symptoms in the morning.  Respiratory: There is no cough.  No chest wall pain.  Cardiovascular: Denied chest pain shortness of breath or palpitations.  There is no notable lower extremity swelling.    Gastrointestinal: Denies nausea vomiting.  No abdominal pain no diarrhea or constipation.  Endocrine:Has no sensitivity to cold or heat.  Denied undue thirst.   Genitourinary:Has no urinary symptoms, no nocturia.  Musculoskeletal: There is no musculoskeletal pain and swelling.    Skin: He does not have any rashes.   Allergic/Immunologic: Negative.   Neurological: No Numbness  Hematological: Negative.   Psychiatric/Behavioral: No anxiety or depression symptoms.   Please see above.  The rest of the review of systems are negative for all systems.    Patient Care Team:  Jase Rogers MD as PCP - General  Jase Rogers MD Anene, Ositadinma Chukwurah, MD as Assigned PCP     Patient Active Problem List   Diagnosis     Onychomycosis     Obesity     Hyperlipidemia     Benign Essential Hypertension     Urinary Frequency  More Than Twice At Night (Nocturia)     Sebaceous Cyst     Kidney cysts     Routine general medical examination at a health care facility     Elevated PSA     Post-nasal drainage     No past medical history on file.   Past Surgical History:   Procedure Laterality Date     NM APPENDECTOMY      Description: Appendectomy;  Recorded: 02/05/2013;      Family History   Problem Relation Age of Onset     No Medical Problems Mother      Prostate cancer Father 57     Heart disease Father 57     Diabetes Father      No Medical Problems Sister      No Medical Problems Brother       Social History     Socioeconomic History     Marital status:      Spouse name: Not on file     Number of children: Not on file     Years of education: Not on file     Highest education level: Not on file   Occupational History     Not on file   Social Needs     Financial resource strain: Not on file     Food insecurity     Worry: Not on file     Inability: Not on file     Transportation needs     Medical: Not on file     Non-medical: Not on file   Tobacco Use     Smoking status: Former Smoker     Smokeless tobacco: Never Used   Substance and Sexual Activity     Alcohol use: Yes     Alcohol/week: 6.0 standard drinks     Types: 6 Cans of beer per week     Drug use: No     Sexual activity: Yes     Partners: Female   Lifestyle     Physical activity     Days per week: Not on file     Minutes per session: Not on file     Stress: Not on file   Relationships     Social connections     Talks on phone: Not on file     Gets together: Not on file     Attends Restorationism service: Not on file     Active member of club or organization: Not on file     Attends meetings of clubs or organizations: Not on file     Relationship status: Not on file     Intimate partner violence     Fear of current or ex partner: Not on file     Emotionally abused: Not on file     Physically abused: Not on file     Forced sexual activity: Not on file   Other Topics Concern     Not  "on file   Social History Narrative     Not on file      Current Outpatient Medications   Medication Sig Dispense Refill     atorvastatin (LIPITOR) 40 MG tablet TAKE 1 TABLET(40 MG) BY MOUTH AT BEDTIME 90 tablet 2     betamethasone valerate (VALISONE) 0.1 % cream Apply topically 2 (two) times a day. 45 g 1     doxazosin (CARDURA) 4 MG tablet TAKE 1 TABLET(4 MG) BY MOUTH AT BEDTIME 90 tablet 2     fluticasone (FLONASE) 50 mcg/actuation nasal spray 2 sprays into each nostril daily. 16 g 1     meloxicam (MOBIC) 15 MG tablet TK 1 T PO D WF  0     metoprolol succinate (TOPROL-XL) 50 MG 24 hr tablet TAKE 1 TABLET(50 MG) BY MOUTH DAILY 90 tablet 2     No current facility-administered medications for this visit.       Objective:   Vital Signs:   Visit Vitals  /86   Pulse 72   Temp 98  F (36.7  C) (Oral)   Ht 5' 7.5\" (1.715 m)   Wt (!) 263 lb (119.3 kg)   BMI 40.58 kg/m           VisionScreening:  No exam data present       Physical Exam:  General Appearance: Alert, cooperative, no distress, appears stated age  Head: Normocephalic, without obvious abnormality, atraumatic  Eyes: PERRL, conjunctiva/corneas clear, EOM's intact  Ears: Normal TM's and external ear canals, both ears  Nose: Nares normal, septum midline,mucosa normal, no drainage  Throat: Lips, mucosa, and tongue normal; teeth and gums normal  Neck: Supple, symmetrical, trachea midline, no adenopathy;  thyroid: not enlarged, symmetric, no tenderness/mass/nodules; no carotid bruit or JVD  Back: Symmetric, no curvature, ROM normal, no CVA tenderness  Lungs: Clear to auscultation bilaterally, respirations unlabored  Heart: Regular rate and rhythm, S1 and S2 normal, no murmur, rub, or gallop,  Abdomen: Soft, non-tender, bowel sounds active all four quadrants,  no masses, no organomegaly but obese abdomen.  Musculoskeletal: Normal range of motion. No joint swelling or deformity.   Extremities: Extremities normal, atraumatic, no cyanosis or edema  Skin: Skin color, " texture, turgor normal, no rashes or lesions  Lymph nodes: Cervical, supraclavicular, and axillary nodes normal  Neurologic: He is alert. He has normal reflexes.   Psychiatric: He has a normal mood and affect.       Assessment Results 9/18/2020   Activities of Daily Living No help needed   Instrumental Activities of Daily Living No help needed   Mini Cog Total Score 5   Some recent data might be hidden     A Mini-Cog score of 0-2 suggests the possibility of dementia, score of 3-5 suggests no dementia  He does not have any problems with his gait or fall.  And he does not have any memory related problems.    Identified Health Risks:     Information on urinary incontinence and treatment options given to patient.  Information regarding advance directives (living [place]), including where he can download the appropriate form, was provided to the patient via the AVS.

## 2021-06-16 PROBLEM — E66.01 MORBID OBESITY (H): Status: ACTIVE | Noted: 2020-09-18

## 2021-06-16 NOTE — PROGRESS NOTES
Assessment and Plan:       1. Routine general medical examination at a health care facility  - Comprehensive Metabolic Panel  - [facility] LAV    2. Encounter for screening for lipoid disorders  - Lipid Cascade, RANDOM    3. Screening for malignant neoplasm of prostate  - PSA (Prostatic-Specific Antigen), Annual Screen    4. Post-nasal drainage  - fluticasone (FLONASE) 50 mcg/actuation nasal spray; 2 sprays into each nostril daily.  Dispense: 16 g; Refill: 1    5. Vitamin D deficiency  - Vitamin D, Total (25-Hydroxy)    6. Need for prophylactic vaccination against Streptococcus pneumoniae (pneumococcus)  - Pneumococcal polysaccharide vaccine 23-valent 3 yo or older, subq/IM        The patient's current medical problems were reviewed.  As noted above medications were prescribed for his postnasal drip which she has had do not necessarily giving him a lot of problems at this point but he would like to try the nose spray.  He has also had a history of vitamin D deficiency and has been on vitamin D supplements.  He is currently taking vitamin D supplements about 1700-9829 daily.  He wanted to have a check of the level of this point.  Discussed fully the health maintenance needs for the patient at this point.  His questions were answered.    I have had an Advance Directives discussion with the patient.  The following health maintenance schedule was reviewed with the patient and provided in printed form in the after visit summary:   Health Maintenance   Topic Date Due     PNEUMOCOCCAL POLYSACCHARIDE VACCINE AGE 65 AND OVER  01/12/2014     INFLUENZA VACCINE RULE BASED (1) 08/01/2017     HYPERTENSION FOLLOW-UP  10/04/2017     TD 18+ HE  03/04/2018     FALL RISK ASSESSMENT  04/04/2018     COLONOSCOPY  09/28/2019     ADVANCE DIRECTIVES DISCUSSED WITH PATIENT  04/04/2022     PNEUMOCOCCAL CONJUGATE VACCINE FOR ADULTS (PCV13 OR PREVNAR)  Completed     ZOSTER VACCINE  Completed        Subjective:   Chief Complaint: Sharad Yi is  an 68 y.o. male here for an Annual Wellness visit.   HPI: He comes in today for any wellness check without having any major symptoms.  He did have his wellness check again last year and this was reviewed as well as labs were also reviewed.  He denied having any major concerns at this point and feels well.  He does exercise and is active.  He does appear to be up-to-date in his health maintenance.      Review of Systems:     Constitutional:Denied any fatigue no fevers no chills.  Has good appetite.  HEENT: Does not have any neck pain.  No difficulty swallowing denies having any postnasal drips.    Respiratory: There is no cough.  No chest wall pain.  Cardiovascular: Denied chest pain shortness of breath or palpitations.  There is no notable lower extremity swelling.    Gastrointestinal: Denies nausea vomiting.  No abdominal pain no diarrhea or constipation.  Endocrine:Has no sensitivity to cold or heat.  Denied undue thirst.   Genitourinary:Has no urinary symptoms, no nocturia.  Musculoskeletal: There is no musculoskeletal pain and swelling.    Skin: He does not have any rashes.   Allergic/Immunologic: Negative.   Neurological: No Numbness  Hematological: Negative.   Psychiatric/Behavioral: No anxiety or depression symptoms.     Please see above.  The rest of the review of systems are negative for all systems.    Patient Care Team:  Jase Rogers MD as PCP - General  Jase Rogers MD     Patient Active Problem List   Diagnosis     Onychomycosis     Obesity     Hyperlipidemia     Benign Essential Hypertension     Urinary Frequency More Than Twice At Night (Nocturia)     Sebaceous Cyst     Kidney cysts     Routine general medical examination at a health care facility     Elevated PSA     Post-nasal drainage     No past medical history on file.   Past Surgical History:   Procedure Laterality Date     DC APPENDECTOMY      Description: Appendectomy;  Recorded: 02/05/2013;      Family History  "  Problem Relation Age of Onset     No Medical Problems Mother      Prostate cancer Father 57     Heart disease Father 57     Diabetes Father      No Medical Problems Sister      No Medical Problems Brother       Social History     Social History     Marital status:      Spouse name: N/A     Number of children: N/A     Years of education: N/A     Occupational History     Not on file.     Social History Main Topics     Smoking status: Former Smoker     Smokeless tobacco: Never Used     Alcohol use 3.6 oz/week     6 Cans of beer per week     Drug use: No     Sexual activity: Yes     Partners: Female     Other Topics Concern     Not on file     Social History Narrative      Current Outpatient Prescriptions   Medication Sig Dispense Refill     atorvastatin (LIPITOR) 40 MG tablet Take 1 tablet (40 mg total) by mouth at bedtime. 90 tablet 3     betamethasone valerate (VALISONE) 0.1 % cream Apply topically 2 (two) times a day. 45 g 0     doxazosin (CARDURA) 4 MG tablet Take 1 tablet (4 mg total) by mouth at bedtime. 90 tablet 3     ergocalciferol (VITAMIN D2) 50,000 unit capsule Take 1 capsule (50,000 Units total) by mouth once a week. 6 capsule 0     metoprolol succinate (TOPROL XL) 50 MG 24 hr tablet Take 1 tablet (50 mg total) by mouth daily. 90 tablet 1     No current facility-administered medications for this visit.       Objective:   Vital Signs:   Visit Vitals     /70 (Patient Site: Left Arm, Patient Position: Sitting, Cuff Size: Adult Large)     Pulse 68     Ht 5' 8\" (1.727 m)     Wt (!) 250 lb 9.6 oz (113.7 kg)     BMI 38.1 kg/m2        VisionScreening:  No exam data present     PHYSICAL EXAM  General Appearance: Alert, cooperative, no distress, appears stated age  Head: Normocephalic, without obvious abnormality, atraumatic  Eyes: PERRL, conjunctiva/corneas clear, EOM's intact  Ears: Normal TM's and external ear canals, both ears  Nose: Nares normal, septum midline,mucosa normal, no " drainage  Throat: Lips, mucosa, and tongue normal; teeth and gums normal  Neck: Supple, symmetrical, trachea midline, no adenopathy;  thyroid: not enlarged, symmetric, no tenderness/mass/nodules; no carotid bruit or JVD  Back: Symmetric, no curvature, ROM normal, no CVA tenderness  Lungs: Clear to auscultation bilaterally, respirations unlabored  Heart: Regular rate and rhythm, S1 and S2 normal, no murmur, rub, or gallop,  Abdomen: Soft, non-tender, bowel sounds active all four quadrants,  no masses, no organomegaly  Musculoskeletal: Normal range of motion. No joint swelling or deformity.   Extremities: Extremities normal, atraumatic, no cyanosis or edema  Skin: Skin color, texture, turgor normal, no rashes or lesions  Lymph nodes: Cervical, supraclavicular, and axillary nodes normal  Neurologic: He is alert. He has normal reflexes.   Psychiatric: He has a normal mood and affect.       Assessment Results 2/26/2018   Activities of Daily Living No help needed   Instrumental Activities of Daily Living No help needed   Mini Cog Total Score 3   Some recent data might be hidden     A Mini-Cog score of 0-2 suggests the possibility of dementia, score of 3-5 suggests no dementia    Identified Health Risks:     The patient was counseled and encouraged to consider modifying their diet and eating habits. He was provided with information on recommended healthy diet options.  The patient was provided with written information regarding signs of hearing loss.  Information regarding advance directives (living [place]), including where he can download the appropriate form, was provided to the patient via the AVS.

## 2021-06-16 NOTE — PROGRESS NOTES
ASSESSMENT:  1. Eczema, unspecified type  - betamethasone valerate (VALISONE) 0.1 % cream; Apply topically 2 (two) times a day.  Dispense: 45 g; Refill: 1    2. Onychomycosis  - ciclopirox (PENLAC) 8 % solution; Apply over nail and surrounding skin. Apply daily over previous coat. After six (6) days, may remove with alcohol and continue cycle.  Dispense: 6.6 mL; Refill: 2      PLAN / MDM:  The lesion looks like contact dermatitis, though has been treating it on his own.Gave a prescription for steroid cream to use on it. Advised to inform me if there is no improvement.  Gave a solution that he can use on the toe fungus.    No orders of the defined types were placed in this encounter.    Medications Discontinued During This Encounter   Medication Reason     fluticasone (FLONASE) 50 mcg/actuation nasal spray Therapy completed     meloxicam (MOBIC) 15 MG tablet Therapy completed     betamethasone valerate (VALISONE) 0.1 % cream Reorder       No follow-ups on file.      CHIEF COMPLAINT:  Chief Complaint   Patient presents with     Rash     on top of right foot x 2 week with some swelling       HISTORY OF PRESENT ILLNESS:  Sharad is a 71 y.o. male presenting to the clinic today with having noted about 2 weeks ago an area on the right feet that started itching. Also noted some sensitivity on the area. Itched at the beginning. Has been using Calamine lotion on it at night and it helps him to sleep. Feels like there is now some major improvement. He also has toe nail fungus on the right big toe which he wanted to have some treatment on.    REVIEW OF SYSTEMS:   Noted doing well. Still active, with no shortness of breath or dyspnea. Has no chest pain. No fever is noted and no chills. All other systems are negative.    PFSH:  Reviewed, as below.    Social History     Tobacco Use   Smoking Status Former Smoker   Smokeless Tobacco Never Used       Family History   Problem Relation Age of Onset     No Medical Problems Mother       Prostate cancer Father 57     Heart disease Father 57     Diabetes Father      No Medical Problems Sister      No Medical Problems Brother        Social History     Socioeconomic History     Marital status:      Spouse name: Not on file     Number of children: Not on file     Years of education: Not on file     Highest education level: Not on file   Occupational History     Not on file   Social Needs     Financial resource strain: Not on file     Food insecurity     Worry: Not on file     Inability: Not on file     Transportation needs     Medical: Not on file     Non-medical: Not on file   Tobacco Use     Smoking status: Former Smoker     Smokeless tobacco: Never Used   Substance and Sexual Activity     Alcohol use: Yes     Alcohol/week: 6.0 standard drinks     Types: 6 Cans of beer per week     Drug use: No     Sexual activity: Yes     Partners: Female   Lifestyle     Physical activity     Days per week: Not on file     Minutes per session: Not on file     Stress: Not on file   Relationships     Social connections     Talks on phone: Not on file     Gets together: Not on file     Attends Yazidism service: Not on file     Active member of club or organization: Not on file     Attends meetings of clubs or organizations: Not on file     Relationship status: Not on file     Intimate partner violence     Fear of current or ex partner: Not on file     Emotionally abused: Not on file     Physically abused: Not on file     Forced sexual activity: Not on file   Other Topics Concern     Not on file   Social History Narrative     Not on file       Past Surgical History:   Procedure Laterality Date     [place] APPENDECTOMY      Description: Appendectomy;  Recorded: 02/05/2013;       No Known Allergies    Active Ambulatory Problems     Diagnosis Date Noted     Onychomycosis      Obesity      Hyperlipidemia      Benign Essential Hypertension      Urinary Frequency More Than Twice At Night (Nocturia)      Sebaceous Cyst       Kidney cysts 02/23/2015     Routine general medical examination at a health care facility 02/23/2015     Elevated PSA 03/02/2015     Post-nasal drainage 10/06/2015     Morbid obesity (H) 09/18/2020     Resolved Ambulatory Problems     Diagnosis Date Noted     No Resolved Ambulatory Problems     No Additional Past Medical History       Current Outpatient Medications   Medication Sig Dispense Refill     atorvastatin (LIPITOR) 40 MG tablet TAKE 1 TABLET(40 MG) BY MOUTH AT BEDTIME 90 tablet 2     betamethasone valerate (VALISONE) 0.1 % cream Apply topically 2 (two) times a day. 45 g 1     doxazosin (CARDURA) 4 MG tablet TAKE 1 TABLET(4 MG) BY MOUTH AT BEDTIME 90 tablet 2     metoprolol succinate (TOPROL-XL) 50 MG 24 hr tablet Take 1 tablet (50 mg total) by mouth daily. 90 tablet 3     ciclopirox (PENLAC) 8 % solution Apply over nail and surrounding skin. Apply daily over previous coat. After six (6) days, may remove with alcohol and continue cycle. 6.6 mL 2     No current facility-administered medications for this visit.        VITALS:  Vitals:    04/05/21 0924   BP: 120/80   Patient Site: Left Arm   Patient Position: Sitting   Cuff Size: Adult Large   Pulse: 72   SpO2: 96%   Weight: (!) 260 lb 14.4 oz (118.3 kg)     Wt Readings from Last 3 Encounters:   04/05/21 (!) 260 lb 14.4 oz (118.3 kg)   09/18/20 (!) 263 lb (119.3 kg)   10/02/19 (!) 250 lb (113.4 kg)     Body mass index is 40.26 kg/m .    PHYSICAL EXAM:  General Appearance: Alert, cooperative, no distress, appears stated age  HEENT: Pupils are equal and reactive, extraocular motions is normal. Neck is supple no notable thyromegaly.  External ears are normal.  Lungs: Clear to auscultation bilaterally, respirations unlabored  Heart: Regular rhythm and normal rate,S1 and S2 normal  Abdomen: Soft  Musculoskeletal: Normal range of motion. Mild swelling to the both lower extremities but more on the right.Has an excoriated and scaly area on the dorsum of the right feet  that measures about 6 x 6 cm.  Fungal nail infection on the big toe.  Neurologic:  Alert and oriented times 3.   Psychiatric: Normal mood and affect.    MEDICATIONS:  Current Outpatient Medications   Medication Sig Dispense Refill     atorvastatin (LIPITOR) 40 MG tablet TAKE 1 TABLET(40 MG) BY MOUTH AT BEDTIME 90 tablet 2     betamethasone valerate (VALISONE) 0.1 % cream Apply topically 2 (two) times a day. 45 g 1     doxazosin (CARDURA) 4 MG tablet TAKE 1 TABLET(4 MG) BY MOUTH AT BEDTIME 90 tablet 2     metoprolol succinate (TOPROL-XL) 50 MG 24 hr tablet Take 1 tablet (50 mg total) by mouth daily. 90 tablet 3     ciclopirox (PENLAC) 8 % solution Apply over nail and surrounding skin. Apply daily over previous coat. After six (6) days, may remove with alcohol and continue cycle. 6.6 mL 2     No current facility-administered medications for this visit.

## 2021-06-17 NOTE — PATIENT INSTRUCTIONS - HE
Patient Instructions by Jaes Rogers MD at 3/25/2019  8:00 AM     Author: Jase Rogers MD Service: -- Author Type: Physician    Filed: 3/25/2019  8:12 AM Encounter Date: 3/25/2019 Status: Signed    : Jase Rogers MD (Physician)         Patient Education   Understanding USDA MyPlate  The USDA (US Department of Agriculture) has guidelines to help you make healthy food choices. These are called MyPlate. MyPlate shows the food groups that make up healthy meals using the image of a place setting. Before you eat, think about the healthiest choices for what to put onto your plate or into your cup or bowl. To learn more about building a healthy plate, visit www.choosemyplate.gov.       The Food Groups    Fruits: Any fruit or 100% fruit juice counts as part of the Fruit Group. Fruits may be fresh, canned, frozen, or dried, and may be whole, cut-up, or pureed. Make half your plate fruits and vegetables.    Vegetables: Any vegetable or 100% vegetable juice counts as a member of the Vegetable Group. Vegetables may be fresh, frozen, canned, or dried. They can be served raw or cooked and may be whole, cut-up, or mashed. Make half your plate fruits and vegetables.     Grains: All foods made from grains are part of the Grains Group. These include wheat, rice, oats, cornmeal, and barley such as bread, pasta, oatmeal, cereal, tortillas, and grits. Grains should be no more than a quarter of your plate. At least half of your grains should be whole grains.    Protein: This group includes meat, poultry, seafood, beans and peas, eggs, processed soy products (like tofu), nuts (including nut butters), and seeds. Make protein choices no more than a quarter of your plate. Meat and poultry choices should be lean or low fat.    Dairy: All fluid milk products and foods made from milk that contain calcium, like yogurt and cheese are part of the Dairy Group. (Foods that have little calcium,  such as cream, butter, and cream cheese, are not part of the group.) Most dairy choices should be low-fat or fat-free.    Oils: These are fats that are liquid at room temperature. They include canola, corn, olive, soybean, and sunflower oil. Foods that are mainly oil include mayonnaise, certain salad dressings, and soft margarines. You should have only 5 to 7 teaspoons of oils a day. You probably already get this much from the food you eat.  Use International Network for Outcomes Research(INOR) to Help Build Your Meals  The [employer] can help you plan and track your meals and activity. You can look up individual foods to see or compare their nutritional value. You can get guidelines for what and how much you should eat. You can compare your food choices. And you can assess personal physical activities and see ways you can improve. Go to www.SwapMob.gov/Telematikcker/.    6861-5110 [employer]. [address] [place] [address]. All rights reserved. This information is not intended as a substitute for professional medical care. Always follow your healthcare professional's instructions.           Patient Education   Urinary Incontinence (Male)    Urinary incontinence means not being able to control the release of urine from the bladder.  Causes  Common causes of urinary incontinence in men include:    Infection    Certain medicines    Aging    Poor pelvic muscle tone    Bladder spasms    Obesity    Urinary retention  Nervous system diseases, diabetes, sleep apnea, urinary tract infections, prostate surgery, and pelvic trauma can also cause incontinence. Constipation and smoking have also been identified as risk factors.  Symptoms    Urge incontinence (also called overactive bladder) is a sudden urge to urinate even though there may not be much urine in the bladder. The need to urinate often during the night is common. It is due to bladder spasms.    Stress incontinence is involuntary urine leakage that can occur with  sneezing, coughing, and other actions that put stress on the bladder.  Treatment  Treatment of urinary incontinence depends on the cause. Infections of the bladder are treated with antibiotics. Urinary retention is treated with a bladder catheter.  Home care  Follow these guidelines when caring for yourself at home:    Don't consume foods and drinks that may irritate the bladder. These include drinks containing alcohol, caffeinate, or carbonation; chocolate; and acidic fruits and juices.    Limit fluid intake to 6 to 8 cups a day.    Lose weight if you are overweight. This will reduce your symptoms.    If needed, wear absorbent pads to catch urine. Change pads frequently to maintain hygiene and prevent skin and bladder infections.    Bathe daily to maintain good hygiene.    If an antibiotic was prescribed to treat a bladder infection, be sure to take it until finished, even if you are feeling better before then. This is to make sure your infection has cleared.    If a catheter was left in place, it is important to keep bacteria from getting into the collection bag. Don't disconnect the catheter from the collection bag.    Use a leg band to secure the catheter drainage tube, so it does not pull on the catheter. Drain the collection bag when it becomes full using the drain spout at the bottom of the bag. Don't disconnect the bag from the catheter.    Don't pull on or try to remove a catheter. The catheter must be removed by a healthcare provider.  Follow-up care  Follow up with your healthcare provider, or as advised.  When to seek medical advice  Call your healthcare provider right away if any of these occur:    Fever over 100.4 F (38 C), or as directed by your healthcare provider    Bladder pain or fullness    Abdominal swelling, nausea, or vomiting    Back pain    Weakness, dizziness, or fainting    If a catheter was left in place, return if:  ? Catheter falls out  ? Catheter stops draining for 6 hours  Date Last  Reviewed: 10/1/2017    8584-8856 [employer]. [address] [facility], [place] [address]. All rights reserved. This information is not intended as a substitute for professional medical care. Always follow your healthcare professional's instructions.     Patient Education   Understanding Advance Care Planning  Advance care planning is the process of deciding ones own future medical care. It helps ensure that if you cant speak for yourself, your wishes can still be carried out. The plan is a series of legal documents that note a persons wishes. The documents vary by state. Advance care planning may be done when a person has a serious illness that is expected to get worse. It may be done before major surgery. And it can help you and your family be prepared in case of a major illness or injury. Advance care planning helps with making decisions at these times.       A health care proxy is a person who acts as the voice of a patient when the patient cant speak for himself or herself. The name of this role varies by state. It may be called a Durable Medical Power of  or Durable Power of  for Healthcare. It may be called an agent, surrogate, or advocate. Or it may be called a representative or decision maker. It is an official duty that is identified by a legal document. The document also varies by state.    Why Is Advance Care Planning Important?  If a person communicates their healthcare wishes:    They will be given medical care that matches their values and goals.    Their family members will not be forced to make decisions in a crisis with no guidance.  Creating a Plan  Making an advance care plan is often done in 3 steps:    Thinking about ones wishes. To create an advance care plan, you should think about what kind of medical treatment you would want if you lose the ability to communicate. Are there any situations in which you would refuse or stop treatment? Are there therapies you would  want or not want? And whom do you want to make decisions for you? There are many places to learn more about how to plan for your care. Ask your doctor or  for resources.    Picking a health care proxy. This means choosing a trusted person to speak for you only when you cant speak for yourself. When you cannot make medical decisions, your proxy makes sure the instructions in your advance care plan are followed. A proxy does not make decisions based on his or her own opinions. They must put aside those opinions and values if needed, and carry out your wishes.    Filling out the legal documents. There are several kinds of legal documents for advance care planning. Each one tells health care providers your wishes. The documents may vary by state. They must be signed and may need to be witnessed or notarized. You can cancel or change them whenever you wish. Depending on your state, the documents may include a Healthcare Proxy form, Living Will, Durable Medical Power of , Advance Directive, or others.  The Familys Role  The best help a family can give is to support their loved ones wishes. Open and honest communication is vital. Family should express any concerns they have about the patients choices while the patient can still make decisions.    4618-0039 [employer]. [address] [place] [address], [place] [address]. All rights reserved. This information is not intended as a substitute for professional medical care. Always follow your healthcare professional's instructions.         Also, [facility] [employer] [place] offers a free, downloadable health care directive that allows you to share your treatment choices and personal preferences if you cannot communicate your wishes. It also allows you to appoint another person (called a health care agent) to make health care decisions if you are unable to do so. You can download an advance directive by going here:  [email or web address]     Patient Education   Personalized Prevention Plan  You are due for the preventive services outlined below.  Your care team is available to assist you in scheduling these services.  If you have already completed any of these items, please share that information with your care team to update in your medical record.  Health Maintenance   Topic Date Due   ? ZOSTER VACCINES (2 of 3) 04/14/2014   ? HYPERTENSION FOLLOW-UP  10/04/2017   ? TD 18+ HE  03/04/2018   ? INFLUENZA VACCINE RULE BASED (1) 08/01/2018   ? FALL RISK ASSESSMENT  02/26/2019   ? COLONOSCOPY  09/28/2019   ? ADVANCE DIRECTIVES DISCUSSED WITH PATIENT  02/26/2023   ? PNEUMOCOCCAL POLYSACCHARIDE VACCINE AGE 65 AND OVER  Completed   ? PNEUMOCOCCAL CONJUGATE VACCINE FOR ADULTS (PCV13 OR PREVNAR)  Completed

## 2021-06-18 NOTE — PATIENT INSTRUCTIONS - HE
Patient Instructions by Jase Rogers MD at 9/18/2020  1:00 PM     Author: Jase Rogers MD Service: -- Author Type: Physician    Filed: 9/18/2020  1:40 PM Encounter Date: 9/18/2020 Status: Addendum    : Jase Rogers MD (Physician)    Related Notes: Original Note by Jase Rogers MD (Physician) filed at 9/18/2020  1:29 PM         Patient Education   Urinary Incontinence (Male)    Urinary incontinence means not being able to control the release of urine from the bladder.  Causes  Common causes of urinary incontinence in men include:    Infection    Certain medicines    Aging    Poor pelvic muscle tone    Bladder spasms    Obesity    Urinary retention  Nervous system diseases, diabetes, sleep apnea, urinary tract infections, prostate surgery, and pelvic trauma can also cause incontinence. Constipation and smoking have also been identified as risk factors.  Symptoms    Urge incontinence (also called overactive bladder) is a sudden urge to urinate even though there may not be much urine in the bladder. The need to urinate often during the night is common. It is due to bladder spasms.    Stress incontinence is involuntary urine leakage that can occur with sneezing, coughing, and other actions that put stress on the bladder.  Treatment  Treatment of urinary incontinence depends on the cause. Infections of the bladder are treated with antibiotics. Urinary retention is treated with a bladder catheter.  Home care  Follow these guidelines when caring for yourself at home:    Don't consume foods and drinks that may irritate the bladder. These include drinks containing alcohol, caffeinate, or carbonation; chocolate; and acidic fruits and juices.    Limit fluid intake to 6 to 8 cups a day.    Lose weight if you are overweight. This will reduce your symptoms.    If needed, wear absorbent pads to catch urine. Change pads frequently to maintain hygiene and prevent  skin and bladder infections.    Bathe daily to maintain good hygiene.    If an antibiotic was prescribed to treat a bladder infection, be sure to take it until finished, even if you are feeling better before then. This is to make sure your infection has cleared.    If a catheter was left in place, it is important to keep bacteria from getting into the collection bag. Don't disconnect the catheter from the collection bag.    Use a leg band to secure the catheter drainage tube, so it does not pull on the catheter. Drain the collection bag when it becomes full using the drain spout at the bottom of the bag. Don't disconnect the bag from the catheter.    Don't pull on or try to remove a catheter. The catheter must be removed by a healthcare provider.  Follow-up care  Follow up with your healthcare provider, or as advised.  When to seek medical advice  Call your healthcare provider right away if any of these occur:    Fever over 100.4 F (38 C), or as directed by your healthcare provider    Bladder pain or fullness    Abdominal swelling, nausea, or vomiting    Back pain    Weakness, dizziness, or fainting    If a catheter was left in place, return if:  ? Catheter falls out  ? Catheter stops draining for 6 hours  Date Last Reviewed: 10/1/2017    7715-6380 [employer]. [address] [place] [address]. All rights reserved. This information is not intended as a substitute for professional medical care. Always follow your healthcare professional's instructions.     Patient Education   Understanding Advance Care Planning  Advance care planning is the process of deciding ones own future medical care. It helps ensure that if you cant speak for yourself, your wishes can still be carried out. The plan is a series of legal documents that note a persons wishes. The documents vary by state. Advance care planning may be done when a person has a serious illness that is expected to get worse. It may be done before  major surgery. And it can help you and your family be prepared in case of a major illness or injury. Advance care planning helps with making decisions at these times.       A health care proxy is a person who acts as the voice of a patient when the patient cant speak for himself or herself. The name of this role varies by state. It may be called a Durable Medical Power of  or Durable Power of  for Healthcare. It may be called an agent, surrogate, or advocate. Or it may be called a representative or decision maker. It is an official duty that is identified by a legal document. The document also varies by state.    Why Is Advance Care Planning Important?  If a person communicates their healthcare wishes:    They will be given medical care that matches their values and goals.    Their family members will not be forced to make decisions in a crisis with no guidance.  Creating a Plan  Making an advance care plan is often done in 3 steps:    Thinking about ones wishes. To create an advance care plan, you should think about what kind of medical treatment you would want if you lose the ability to communicate. Are there any situations in which you would refuse or stop treatment? Are there therapies you would want or not want? And whom do you want to make decisions for you? There are many places to learn more about how to plan for your care. Ask your doctor or  for resources.    Picking a health care proxy. This means choosing a trusted person to speak for you only when you cant speak for yourself. When you cannot make medical decisions, your proxy makes sure the instructions in your advance care plan are followed. A proxy does not make decisions based on his or her own opinions. They must put aside those opinions and values if needed, and carry out your wishes.    Filling out the legal documents. There are several kinds of legal documents for advance care planning. Each one tells health care  providers your wishes. The documents may vary by state. They must be signed and may need to be witnessed or notarized. You can cancel or change them whenever you wish. Depending on your state, the documents may include a Healthcare Proxy form, Living Will, Durable Medical Power of , Advance Directive, or others.  The Familys Role  The best help a family can give is to support their loved ones wishes. Open and honest communication is vital. Family should express any concerns they have about the patients choices while the patient can still make decisions.    8058-3002 [employer]. [address] [place] [address]. All rights reserved. This information is not intended as a substitute for professional medical care. Always follow your healthcare professional's instructions.         Also, [facility] offers a free, downloadable health care directive that allows you to share your treatment choices and personal preferences if you cannot communicate your wishes. It also allows you to appoint another person (called a health care agent) to make health care decisions if you are unable to do so. You can download an advance directive by going here: [email or web address]     Patient Education   Personalized Prevention Plan  You are due for the preventive services outlined below.  Your care team is available to assist you in scheduling these services.  If you have already completed any of these items, please share that information with your care team to update in your medical record.  Health Maintenance   Topic Date Due   ? HEPATITIS C SCREENING  1950   ? ZOSTER VACCINES (2 of 3) 04/14/2014   ? HYPERTENSION FOLLOW-UP  10/04/2017   ? MEDICARE ANNUAL WELLNESS VISIT  03/25/2020   ? FALL RISK ASSESSMENT  03/25/2020   ? INFLUENZA VACCINE RULE BASED (1) 08/01/2020   ? LIPID  03/25/2024   ? ADVANCE CARE PLANNING  03/25/2024   ? TD 18+ HE  03/25/2029   ?  COLORECTAL CANCER SCREENING  04/25/2029   ? PNEUMOCOCCAL IMMUNIZATION 65+ LOW/MEDIUM RISK  Completed   ? HEPATITIS B VACCINES  Aged Out     Senna S take 1 or 2 tabs daily  Miralax a capful in 8 oz daily as needed

## 2021-06-22 NOTE — TELEPHONE ENCOUNTER
Refill Approved    Rx renewed per Medication Renewal Policy. Medication was last renewed on 7/18/18.    Claudia Justice, [facility] Triage/Med Refill 1/7/2019     Requested Prescriptions   Pending Prescriptions Disp Refills     metoprolol succinate (TOPROL XL) 50 MG 24 hr tablet 90 tablet 1     Sig: Take 1 tablet (50 mg total) by mouth daily.    Beta-Blockers Refill Protocol Passed - 1/6/2019 10:16 AM       Passed - PCP or prescribing provider visit in past 12 months or next 3 months    Last office visit with prescriber/PCP: 10/6/2015 Jase Rogers MD OR same dept: Visit date not found OR same specialty: 10/6/2015 Jase Rogers MD  Last physical: 2/26/2018 Last MTM visit: Visit date not found   Next visit within 3 mo: Visit date not found  Next physical within 3 mo: Visit date not found  Prescriber OR PCP: Jase Rogers MD  Last diagnosis associated with med order: 1. Benign Essential Hypertension  - metoprolol succinate (TOPROL XL) 50 MG 24 hr tablet; Take 1 tablet (50 mg total) by mouth daily.  Dispense: 90 tablet; Refill: 1    If protocol passes may refill for 12 months if within 3 months of last provider visit (or a total of 15 months).            Passed - Blood pressure filed in past 12 months    BP Readings from Last 1 Encounters:   10/15/18 124/84

## 2021-06-26 NOTE — PROGRESS NOTES
Progress Notes by Anshu Stewart DO at 10/15/2018 11:50 AM     Author: Anshu Stewart DO Service: -- Author Type: Physician    Filed: 10/15/2018 12:19 PM Encounter Date: 10/15/2018 Status: Signed    : Anshu Stewart DO (Physician)       Chief Complaint   Patient presents with   ? Insect Bite     L/t leg x 1 week, tender to touch        History of Present Illness: Rooming staff notes reviewed.  Patient noted a probable insect bite on his left lower leg about 6 days ago.  Patient noted some redness around a shallow skin ulcer on left mid/lateral shin yesterday, and this area is tender.  No reported abnormal drainage from his wound.  He has been playing golf recently, and 2 weeks ago he was at a friend's cabin in [place].    Review of systems: See history of present illness, otherwise negative.     Current Outpatient Prescriptions   Medication Sig Dispense Refill   ? atorvastatin (LIPITOR) 40 MG tablet Take 1 tablet (40 mg total) by mouth at bedtime. 90 tablet 3   ? betamethasone valerate (VALISONE) 0.1 % cream Apply topically 2 (two) times a day. 45 g 0   ? doxazosin (CARDURA) 4 MG tablet Take 1 tablet (4 mg total) by mouth at bedtime. 90 tablet 3   ? fluticasone (FLONASE) 50 mcg/actuation nasal spray 2 sprays into each nostril daily. 16 g 1   ? metoprolol succinate (TOPROL XL) 50 MG 24 hr tablet Take 1 tablet (50 mg total) by mouth daily. 90 tablet 1   ? doxycycline (VIBRA-TABS) 100 MG tablet Take 1 tablet (100 mg total) by mouth 2 (two) times a day for 14 days. 28 tablet 0     No current facility-administered medications for this visit.      No past medical history on file.   Past Surgical History:   Procedure Laterality Date   ? NV APPENDECTOMY      Description: Appendectomy;  Recorded: 02/05/2013;      Social History   Substance Use Topics   ? Smoking status: Former Smoker   ? Smokeless tobacco: Never Used   ? Alcohol use 3.6 oz/week     6 Cans of beer per week        Family History   Problem Relation  "Age of Onset   ? No Medical Problems Mother    ? Prostate cancer Father 57   ? Heart disease Father 57   ? Diabetes Father    ? No Medical Problems Sister    ? No Medical Problems Brother        Vitals:    10/15/18 1156   BP: 124/84   Pulse: 71   Resp: 20   Temp: 97.6  F (36.4  C)   TempSrc: Oral   SpO2: 96%   Weight: (!) 260 lb 12.8 oz (118.3 kg)       EXAM:   General: Vital signs reviewed.  Patient is in no acute appearing distress.  Breathing appears nonlabored.  Patient is alert and oriented ×3.  Examination of left lower leg is notable for a shallow dry ulcer over the lateral/mid shin region, measuring about 2-2.5 mm diameter.  Surrounding this is erythema extending out about 1.5 cm.  No associated edema.  The area of erythema is tender.    Assessment/Plan   1. Insect bite  doxycycline (VIBRA-TABS) 100 MG tablet    left shin       Patient Instructions     Also see info below. I think it is best to treat you for a possible tick bite and associated infection. Avoid excessive sun exposure when taking the doxycycline. Be seen again in 4-5 days if symptoms are not better, sooner if feeling any worse.        Tick Bites  Ticks are small arachnids that feed on the blood of rodents, rabbits, birds, deer, dogs, and people. A tick bite may cause a reaction like a spider bite. You may have redness, itching, and slight swelling at the site. Sometimes you may have no reaction where the tick bit you.  Ticks may gorge themselves for days before you find and remove them. The bites themselves aren't cause for concern. But ticks can carry and pass on illnesses such as Lyme disease and [place] spotted fever. Both diseases begin with a rash and symptoms similar to the flu. In advanced stages, these diseases can be quite serious.     A \"bull's eye\" rash is a common symptom of Lyme disease.   When to go to the emergency room (ER)  Not all ticks carry disease. And a tick must stay attached for at least 24 hours to infect you. " If you find a tick, don't panic. Try to carefully remove it with tweezers. Grasp the tick near its head and pull without twisting. If you can't easily dislodge the tick or if you leave the head in your skin, get medical care right away.  What to expect in the ER    The tick or any parts of the tick will be removed and the bite will be cleaned.    To prevent disease, you may be given antibiotics. Both Lyme disease and [place] spotted fever respond quickly to these medicines.    You may be asked to see your healthcare provider for a blood test to check for Lyme disease.  Follow-up care  Some [facility] and [facility] have services that test ticks for Lyme disease and other diseases. Check with your local officials to see if this service is available in your area.  If you remove a tick yourself, watch for signs of a tick-borne illness. Symptoms may show up within a few days or weeks after a bite. Call your healthcare provider if you notice any of the following:    Rash. The rash may spread outward in a ring from a hard white lump. Or it may move up your arms and legs to your chest.    Chills and fever    Body aches and joint pain    Severe headache  Date Last Reviewed: 12/1/2016 2000-2017 [employer]. [address] [place] [address], [place] [address]. All rights reserved. This information is not intended as a substitute for professional medical care. Always follow your healthcare professional's instructions.           Anshu Stewart,

## 2021-09-01 DIAGNOSIS — E78.5 HYPERLIPIDEMIA, UNSPECIFIED HYPERLIPIDEMIA TYPE: ICD-10-CM

## 2021-09-01 DIAGNOSIS — R35.1 NOCTURIA: ICD-10-CM

## 2021-09-01 NOTE — TELEPHONE ENCOUNTER
"Routing refill request to provider for review/approval because:  Patient needs to be seen because it has been more than 6months since last office visit.    Last Written Prescription Date:  12/8/20  Last Fill Quantity: 90,  # refills: 2   Last office visit provider:  4/5/21     Requested Prescriptions   Pending Prescriptions Disp Refills     doxazosin (CARDURA) 4 MG tablet [Pharmacy Med Name: DOXAZOSIN 4MG TABLETS] 90 tablet 2     Sig: TAKE 1 TABLET(4 MG) BY MOUTH AT BEDTIME       Antiadrenergic Antihypertensives Failed - 9/1/2021  5:15 AM        Failed - Recent (6 mo) or future (30 days) visit within the authorizing provider's specialty     Patient had office visit in the last 6 months or has a visit in the next 30 days with authorizing provider or within the authorizing provider's specialty.  See \"Patient Info\" tab in inbasket, or \"Choose Columns\" in Meds & Orders section of the refill encounter.            Passed - Blood pressure less than 140/90 in past 6 months     BP Readings from Last 3 Encounters:   04/05/21 120/80   09/18/20 138/86   10/02/19 132/84                 Passed - Medication is active on med list        Passed - Patient is age 18 or older        Passed - Normal serum creatinine on file in past 12 months     Recent Labs   Lab Test 09/18/20  1400   CR 0.82       Ok to refill medication if creatinine is low         Alpha Blockers Passed - 9/1/2021  5:15 AM        Passed - Blood pressure under 140/90 in past 12 months     BP Readings from Last 3 Encounters:   04/05/21 120/80   09/18/20 138/86   10/02/19 132/84                 Passed - Recent (12 mo) or future (30 days) visit within the authorizing provider's specialty     Patient has had an office visit with the authorizing provider or a provider within the authorizing providers department within the previous 12 mos or has a future within next 30 days. See \"Patient Info\" tab in inbasket, or \"Choose Columns\" in Meds & Orders section of the refill " "encounter.              Passed - Patient does not have Tadalafil, Vardenafil, or Sildenafil on their medication list        Passed - Medication is active on med list        Passed - Patient is 18 years of age or older           atorvastatin (LIPITOR) 40 MG tablet [Pharmacy Med Name: ATORVASTATIN 40MG TABLETS] 90 tablet 2     Sig: TAKE 1 TABLET(40 MG) BY MOUTH AT BEDTIME       Statins Protocol Passed - 9/1/2021  5:15 AM        Passed - LDL on file in past 12 months     Recent Labs   Lab Test 09/18/20  1400   LDL 95             Passed - No abnormal creatine kinase in past 12 months     No lab results found.             Passed - Recent (12 mo) or future (30 days) visit within the authorizing provider's specialty     Patient has had an office visit with the authorizing provider or a provider within the authorizing providers department within the previous 12 mos or has a future within next 30 days. See \"Patient Info\" tab in inbasket, or \"Choose Columns\" in Meds & Orders section of the refill encounter.              Passed - Medication is active on med list        Passed - Patient is age 18 or older             brayden baron RN 09/01/21 3:43 PM  "

## 2021-09-03 RX ORDER — ATORVASTATIN CALCIUM 40 MG/1
TABLET, FILM COATED ORAL
Qty: 90 TABLET | Refills: 2 | Status: SHIPPED | OUTPATIENT
Start: 2021-09-03 | End: 2022-05-30

## 2021-09-03 RX ORDER — DOXAZOSIN 4 MG/1
TABLET ORAL
Qty: 90 TABLET | Refills: 2 | Status: SHIPPED | OUTPATIENT
Start: 2021-09-03 | End: 2022-05-31

## 2021-10-05 ENCOUNTER — OFFICE VISIT (OUTPATIENT)
Dept: FAMILY MEDICINE | Facility: CLINIC | Age: 71
End: 2021-10-05
Payer: COMMERCIAL

## 2021-10-05 VITALS
SYSTOLIC BLOOD PRESSURE: 120 MMHG | DIASTOLIC BLOOD PRESSURE: 82 MMHG | BODY MASS INDEX: 39.66 KG/M2 | HEIGHT: 68 IN | WEIGHT: 261.7 LBS | HEART RATE: 68 BPM

## 2021-10-05 DIAGNOSIS — E78.5 HYPERLIPIDEMIA, UNSPECIFIED HYPERLIPIDEMIA TYPE: ICD-10-CM

## 2021-10-05 DIAGNOSIS — L81.9 ATYPICAL PIGMENTED SKIN LESION: ICD-10-CM

## 2021-10-05 DIAGNOSIS — Z23 NEED FOR INFLUENZA VACCINATION: ICD-10-CM

## 2021-10-05 DIAGNOSIS — Z13.220 ENCOUNTER FOR SCREENING FOR LIPOID DISORDERS: ICD-10-CM

## 2021-10-05 DIAGNOSIS — E66.01 MORBID OBESITY (H): ICD-10-CM

## 2021-10-05 DIAGNOSIS — I10 ESSENTIAL HYPERTENSION, BENIGN: ICD-10-CM

## 2021-10-05 DIAGNOSIS — Z12.5 SCREENING FOR MALIGNANT NEOPLASM OF PROSTATE: ICD-10-CM

## 2021-10-05 DIAGNOSIS — Z00.00 ENCOUNTER FOR MEDICARE ANNUAL WELLNESS EXAM: Primary | ICD-10-CM

## 2021-10-05 LAB
ALBUMIN SERPL-MCNC: 3.7 G/DL (ref 3.5–5)
ALP SERPL-CCNC: 72 U/L (ref 45–120)
ALT SERPL W P-5'-P-CCNC: 32 U/L (ref 0–45)
ANION GAP SERPL CALCULATED.3IONS-SCNC: 11 MMOL/L (ref 5–18)
AST SERPL W P-5'-P-CCNC: 25 U/L (ref 0–40)
BILIRUB SERPL-MCNC: 0.8 MG/DL (ref 0–1)
BUN SERPL-MCNC: 15 MG/DL (ref 8–28)
CALCIUM SERPL-MCNC: 9.1 MG/DL (ref 8.5–10.5)
CHLORIDE BLD-SCNC: 108 MMOL/L (ref 98–107)
CHOLEST SERPL-MCNC: 183 MG/DL
CO2 SERPL-SCNC: 23 MMOL/L (ref 22–31)
CREAT SERPL-MCNC: 0.86 MG/DL (ref 0.7–1.3)
FASTING STATUS PATIENT QL REPORTED: YES
GFR SERPL CREATININE-BSD FRML MDRD: 87 ML/MIN/1.73M2
GLUCOSE BLD-MCNC: 110 MG/DL (ref 70–125)
HDLC SERPL-MCNC: 48 MG/DL
LDLC SERPL CALC-MCNC: 103 MG/DL
POTASSIUM BLD-SCNC: 4.5 MMOL/L (ref 3.5–5)
PROT SERPL-MCNC: 6.8 G/DL (ref 6–8)
PSA SERPL-MCNC: 3.18 UG/L (ref 0–6.5)
SODIUM SERPL-SCNC: 142 MMOL/L (ref 136–145)
TRIGL SERPL-MCNC: 159 MG/DL

## 2021-10-05 PROCEDURE — 99213 OFFICE O/P EST LOW 20 MIN: CPT | Mod: 25 | Performed by: FAMILY MEDICINE

## 2021-10-05 PROCEDURE — 36415 COLL VENOUS BLD VENIPUNCTURE: CPT | Performed by: FAMILY MEDICINE

## 2021-10-05 PROCEDURE — G0103 PSA SCREENING: HCPCS | Performed by: FAMILY MEDICINE

## 2021-10-05 PROCEDURE — 80061 LIPID PANEL: CPT | Performed by: FAMILY MEDICINE

## 2021-10-05 PROCEDURE — 80053 COMPREHEN METABOLIC PANEL: CPT | Performed by: FAMILY MEDICINE

## 2021-10-05 PROCEDURE — G0008 ADMIN INFLUENZA VIRUS VAC: HCPCS | Performed by: FAMILY MEDICINE

## 2021-10-05 PROCEDURE — 99397 PER PM REEVAL EST PAT 65+ YR: CPT | Mod: 25 | Performed by: FAMILY MEDICINE

## 2021-10-05 ASSESSMENT — MIFFLIN-ST. JEOR: SCORE: 1907.07

## 2021-10-05 ASSESSMENT — ACTIVITIES OF DAILY LIVING (ADL): CURRENT_FUNCTION: NO ASSISTANCE NEEDED

## 2021-10-05 NOTE — PROGRESS NOTES
"    The patient was counseled and encouraged to consider modifying their diet and eating habits. He was provided with information on recommended healthy diet options.  The patient was provided with written information regarding signs of hearing loss.  Information on urinary incontinence and treatment options given to patient.  Answers for HPI/ROS submitted by the patient on 10/5/2021  In general, how would you rate your overall physical health?: good  Frequency of exercise:: 2-3 days/week  Do you usually eat at least 4 servings of fruit and vegetables a day, include whole grains & fiber, and avoid regularly eating high fat or \"junk\" foods? : No  Taking medications regularly:: No  Medication side effects:: None  Activities of Daily Living: no assistance needed  Home safety: lack of grab bars in the bathroom  Hearing Impairment:: difficulty following a conversation in a noisy restaurant or crowded room  In the past 6 months, have you been bothered by leaking of urine?: Yes  In general, how would you rate your overall mental or emotional health?: good  Additional concerns today:: No  Duration of exercise:: Less than 15 minutes      "

## 2021-10-05 NOTE — PATIENT INSTRUCTIONS
Patient Education   Personalized Prevention Plan  You are due for the preventive services outlined below.  Your care team is available to assist you in scheduling these services.  If you have already completed any of these items, please share that information with your care team to update in your medical record.  Health Maintenance Due   Topic Date Due     ANNUAL REVIEW OF HM ORDERS  Never done     Hepatitis C Screening  Never done     AORTIC ANEURYSM SCREENING (SYSTEM ASSIGNED)  Never done     Zoster (Shingles) Vaccine (2 of 3) 04/14/2014     Flu Vaccine (1) 09/01/2021     FALL RISK ASSESSMENT  09/18/2021       Understanding USDA MyPlate  The USDA has guidelines to help you make healthy food choices. These are called MyPlate. MyPlate shows the food groups that make up healthy meals using the image of a place setting. Before you eat, think about the healthiest choices for what to put on your plate or in your cup or bowl. To learn more about building a healthy plate, visit www.choosemyplate.gov.    The food groups    Fruits. Any fruit or 100% fruit juice counts as part of the Fruit Group. Fruits may be fresh, canned, frozen, or dried, and may be whole, cut-up, or pureed. Make 1/2 of your plate fruits and vegetables.    Vegetables. Any vegetable or 100% vegetable juice counts as a member of the Vegetable Group. Vegetables may be fresh, frozen, canned, or dried. They can be served raw or cooked and may be whole, cut-up, or mashed. Make 1/2 of your plate fruits and vegetables.    Grains. All foods made from grains are part of the Grains Group. These include wheat, rice, oats, cornmeal, and barley. Grains are often used to make foods such as bread, pasta, oatmeal, cereal, tortillas, and grits. Grains should be no more than 1/4 of your plate. At least half of your grains should be whole grains.    Protein. This group includes meat, poultry, seafood, beans and peas, eggs, processed soy products (such as tofu), nuts  (including nut butters), and seeds. Make protein choices no more than 1/4 of your plate. Meat and poultry choices should be lean or low fat.    Dairy. The Dairy Group includes all fluid milk products and foods made from milk that contain calcium, such as yogurt and cheese. (Foods that have little calcium, such as cream, butter, and cream cheese, are not part of this group.) Most dairy choices should be low-fat or fat-free.    Oils. Oils aren't a food group, but they do contain essential nutrients. However it's important to watch your intake of oils. These are fats that are liquid at room temperature. They include canola, corn, olive, soybean, vegetable, and sunflower oil. Foods that are mainly oil include mayonnaise, certain salad dressings, and soft margarines. You likely already get your daily oil allowance from the foods you eat.  Things to limit  Eating healthy also means limiting these things in your diet:       Salt (sodium). Many processed foods have a lot of sodium. To keep sodium intake down, eat fresh vegetables, meats, poultry, and seafood when possible. Purchase low-sodium, reduced-sodium, or no-salt-added food products at the store. And don't add salt to your meals at home. Instead, season them with herbs and spices such as dill, oregano, cumin, and paprika. Or try adding flavor with lemon or lime zest and juice.    Saturated fat. Saturated fats are most often found in animal products such as beef, pork, and chicken. They are often solid at room temperature, such as butter. To reduce your saturated fat intake, choose leaner cuts of meat and poultry. And try healthier cooking methods such as grilling, broiling, roasting, or baking. For a simple lower-fat swap, use plain nonfat yogurt instead of mayonnaise when making potato salad or macaroni salad.    Added sugars. These are sugars added to foods. They are in foods such as ice cream, candy, soda, fruit drinks, sports drinks, energy drinks, cookies,  pastries, jams, and syrups. Cut down on added sugars by sharing sweet treats with a family member or friend. You can also choose fruit for dessert, and drink water or other unsweetened beverages.     [employer] last reviewed this educational content on 6/1/2020 2000-2021 [employer]. All rights reserved. This information is not intended as a substitute for professional medical care. Always follow your healthcare professional's instructions.          Signs of Hearing Loss      Hearing much better with one ear can be a sign of hearing loss.   Hearing loss is a problem shared by many people. In fact, it is one of the most common health problems, particularly as people age. Most people age 65 and older have some hearing loss. By age 80, almost everyone does. Hearing loss often occurs slowly over the years. So you may not realize your hearing has gotten worse.  Have your hearing checked  Call your healthcare provider if you:    Have to strain to hear normal conversation    Have to watch other people s faces very carefully to follow what they re saying    Need to ask people to repeat what they ve said    Often misunderstand what people are saying    Turn the volume of the television or radio up so high that others complain    Feel that people are mumbling when they re talking to you    Find that the effort to hear leaves you feeling tired and irritated    Notice, when using the phone, that you hear better with one ear than the other  [employer] last reviewed this educational content on 1/1/2020 2000-2021 [employer]. All rights reserved. This information is not intended as a substitute for professional medical care. Always follow your healthcare professional's instructions.          Urinary Incontinence (Male)    Urinary incontinence means not being able to control the release of urine from the bladder.   Causes  Common causes of urinary incontinence in men include:    Infection    Certain  medicines    Aging    Poor pelvic muscle tone    Bladder spasms    Obesity    Trouble urinating and fully emptying the bladder (urinary retention)  Other things that can cause incontinence are:     Nervous system diseases    Diabetes    Sleep apnea    Urinary tract infections    Prostate surgery    Pelvic injury  Constipation and smoking have also been identified as risk factors.   Symptoms    Urge incontinence (overactive bladder). This is a sudden urge to urinate. It occurs even though there may not be much urine in the bladder. The need to urinate often during the night is common. It's due to bladder spasms.    Stress incontinence. This is urine leakage that you can't control. It can occur with sneezing, coughing, and other actions that put stress on the bladder.    Treatment  Treatment depends on what is causing the condition. Bladder infections are treated with antibiotics. Urinary retention is treated with a bladder catheter.   Home care  Follow these guidelines when caring for yourself at home:    Don't have any foods and drinks that may irritate the bladder. This includes:  ? Chocolate  ? Alcohol  ? Caffeine  ? Carbonated drinks  ? Acidic fruits and juices    Limit fluids to 6 to 8 cups a day.    Lose weight if you are overweight. This will reduce your symptoms.    If advised, do regular pelvic muscle-strengthening exercises such as Kegel exercises.    If needed, wear absorbent pads to catch urine. Change the pads often. This is for good hygiene and to prevent skin and bladder infections.    Bathe daily for good hygiene.    If an antibiotic was prescribed to treat a bladder infection, take it until it's finished. Keep taking it even if you are feeling better. This is to make sure your infection has cleared.    If a catheter was left in place, keep bacteria from getting into the collection bag. Don't disconnect the catheter from the collection bag.    Use a leg band to secure the catheter drainage tube, so it  does not pull on the catheter. Drain the collection bag when it becomes full. To do this, use the drain spout at the bottom of the bag. Don't disconnect the bag from the catheter.    Don't pull on or try to remove a catheter. The catheter must be removed by a healthcare provider.    If you smoke, stop. Ask your provider for help if you can't do this on your own.  Follow-up care  Follow up with your healthcare provider, or as advised.  When to get medical advice  Call your healthcare provider right away if any of these occur:    Fever over 100.4 F (38 C), or as directed by your provider    Bladder pain or fullness    Belly swelling, nausea, or vomiting    Back pain    Weakness, dizziness, or fainting    If a catheter was left in place, return if:  ? The catheter falls out  ? The catheter stops draining for 6 hours  ? Your urine gets cloudy or smells bad  Jj last reviewed this educational content on 1/1/2020 2000-2021 [employer]. All rights reserved. This information is not intended as a substitute for professional medical care. Always follow your healthcare professional's instructions.

## 2021-10-05 NOTE — PROGRESS NOTES
"SUBJECTIVE:   Sharad Yi is a 71 year old male who presents for Preventive Visit.  He comes in today for his physical exam.  Noted no changes from previous.  Other than no concerns.  Feels well.  Does continue to have a postnasal drainage which she is not sure why.  He does not have any seasonal allergies.  Noted that it is not really bothering him.    Patient has been advised of split billing requirements and indicates understanding: Yes   Are you in the first 12 months of your Medicare coverage?  No    Healthy Habits:     In general, how would you rate your overall health?  Good    Frequency of exercise:  2-3 days/week    Duration of exercise:  Less than 15 minutes    Do you usually eat at least 4 servings of fruit and vegetables a day, include whole grains    & fiber and avoid regularly eating high fat or \"junk\" foods?  No    Taking medications regularly:  No    Medication side effects:  None    Ability to successfully perform activities of daily living:  No assistance needed    Home Safety:  Lack of grab bars in the bathroom    Hearing Impairment:  Difficulty following a conversation in a noisy restaurant or crowded room    In the past 6 months, have you been bothered by leaking of urine? Yes    In general, how would you rate your overall mental or emotional health?  Good      PHQ-2 Total Score: 0    Additional concerns today:  No    Do you feel safe in your environment? Yes    Have you ever done Advance Care Planning? (For example, a Health Directive, POLST, or a discussion with a medical provider or your loved ones about your wishes): Yes, patient states has an Advance Care Planning document and will bring a copy to the clinic.       Fall risk  Fallen 2 or more times in the past year?: No  Any fall with injury in the past year?: No    Cognitive Screening   1) Repeat 3 items (Leader, Season, Table)    2) Clock draw: NORMAL  3) 3 item recall: Recalls 2 objects   Results: NORMAL clock, 1-2 items recalled: " COGNITIVE IMPAIRMENT LESS LIKELY    Mini-CogTM Lauren Hall. Licensed by the author for use in [facility]; reprinted with permission ([email or web address]). All rights reserved.      Do you have sleep apnea, excessive snoring or daytime drowsiness?: no    Reviewed and updated as needed this visit by clinical staff  Tobacco  Allergies  Meds              Reviewed and updated as needed this visit by Provider                Social History     Tobacco Use     Smoking status: Former Smoker     Smokeless tobacco: Never Used   Substance Use Topics     Alcohol use: Yes     Alcohol/week: 6.0 standard drinks     If you drink alcohol do you typically have >3 drinks per day or >7 drinks per week? No    Alcohol Use 10/5/2021   Prescreen: >3 drinks/day or >7 drinks/week? Yes   AUDIT SCORE  3     AUDIT - Alcohol Use Disorders Identification Test - Reproduced from the World Health Organization Audit 2001 (Second Edition) 10/5/2021   1.  How often do you have a drink containing alcohol? 2 to 3 times a week   2.  How many drinks containing alcohol do you have on a typical day when you are drinking? 1 or 2   3.  How often do you have five or more drinks on one occasion? Never   4.  How often during the last year have you found that you were not able to stop drinking once you had started? Never   5.  How often during the last year have you failed to do what was normally expected of you because of drinking? Never   6.  How often during the last year have you needed a first drink in the morning to get yourself going after a heavy drinking session? Never   7.  How often during the last year have you had a feeling of guilt or remorse after drinking? Never   8.  How often during the last year have you been unable to remember what happened the night before because of your drinking? Never   9.  Have you or someone else been injured because of your drinking? No   10. Has a relative, friend, doctor or other health care worker been  "concerned about your drinking or suggested you cut down? No   TOTAL SCORE 3               Current providers sharing in care for this patient include:     Patient Care Team:  Jase Rogers MD as PCP - General  Jase Rogers MD as Assigned PCP    The following health maintenance items are reviewed in Epic and correct as of today:  Health Maintenance Due   Topic Date Due     ANNUAL REVIEW OF  ORDERS  Never done     HEPATITIS C SCREENING  Never done     AORTIC ANEURYSM SCREENING (SYSTEM ASSIGNED)  Never done     ZOSTER IMMUNIZATION (2 of 3) 04/14/2014     FALL RISK ASSESSMENT  09/18/2021         Review of Systems  CONSTITUTIONAL: NEGATIVE for fever, chills, change in weight  INTEGUMENTARY/SKIN: NEGATIVE for worrisome rashes, moles or lesions  EYES: NEGATIVE for vision changes or irritation  ENT/MOUTH: NEGATIVE for ear, mouth and throat problems  RESP: NEGATIVE for significant cough or SOB  BREAST: NEGATIVE for masses, tenderness or discharge  CV: NEGATIVE for chest pain, palpitations or peripheral edema  GI: NEGATIVE for nausea, abdominal pain, heartburn, or change in bowel habits  : NEGATIVE for frequency, dysuria, or hematuria  MUSCULOSKELETAL: NEGATIVE for significant arthralgias or myalgia  NEURO: NEGATIVE for weakness, dizziness or paresthesias  ENDOCRINE: NEGATIVE for temperature intolerance, skin/hair changes  HEME: NEGATIVE for bleeding problems  PSYCHIATRIC: NEGATIVE for changes in mood or affect    OBJECTIVE:   /82 (BP Location: Left arm, Patient Position: Sitting, Cuff Size: Adult Large)   Pulse 68   Ht 1.72 m (5' 7.72\")   Wt 118.7 kg (261 lb 11.2 oz)   BMI 40.12 kg/m   Estimated body mass index is 40.12 kg/m  as calculated from the following:    Height as of this encounter: 1.72 m (5' 7.72\").    Weight as of this encounter: 118.7 kg (261 lb 11.2 oz).  Physical Exam  GENERAL: healthy, alert and no distress  EYES: Eyes grossly normal to inspection, PERRL and conjunctivae and sclerae " "normal  HENT: ear canals and TM's normal, nose and mouth without ulcers or lesions  NECK: no adenopathy, no asymmetry, masses, or scars and thyroid normal to palpation  RESP: lungs clear to auscultation - no rales, rhonchi or wheezes  CV: regular rate and rhythm, normal S1 S2, no S3 or S4, no murmur, click or rub, no peripheral edema and peripheral pulses strong  ABDOMEN: soft, nontender, no organomegaly or masses and obese.  MS: no gross musculoskeletal defects noted, no edema  SKIN: Multiple seborrhea on the back but a slightly raised lesion on the right flank.(Patient has been picking on it)  NEURO: Normal strength and tone, mentation intact and speech normal  PSYCH: mentation appears normal, affect normal/bright      ASSESSMENT / PLAN:   Sharad was seen today for medicare visit.    Diagnoses and all orders for this visit:    Encounter for Medicare annual wellness exam  -     Comprehensive metabolic panel (BMP + Alb, Alk Phos, ALT, AST, Total. Bili, TP); Future    Benign Essential Hypertension    Hyperlipidemia, unspecified hyperlipidemia type    Morbid obesity (H)    Atypical pigmented skin lesion  -     Adult Dermatology Referral; Future    Screening for malignant neoplasm of prostate  -     PSA, screen; Future    Encounter for screening for lipoid disorders  -     Lipid Profile (Chol, Trig, HDL, LDL calc); Future    Need for influenza vaccination    Other orders  -     RI FLU VACCINE, INCREASED ANTIGEN, PRESV FREE        Patient has been advised of split billing requirements and indicates understanding: Yes  COUNSELING:  Reviewed preventive health counseling, as reflected in patient instructions  Special attention given to:       Regular exercise       Healthy diet/nutrition       Will get flu shot. Referred to Dermatology for the skin lesion. Will discuss labs when available.    Estimated body mass index is 40.12 kg/m  as calculated from the following:    Height as of this encounter: 1.72 m (5' 7.72\").    " Weight as of this encounter: 118.7 kg (261 lb 11.2 oz).    Weight management plan: Discussed healthy diet and exercise guidelines    He reports that he has quit smoking. He has never used smokeless tobacco.      Appropriate preventive services were discussed with this patient, including applicable screening as appropriate for cardiovascular disease, diabetes, osteopenia/osteoporosis, and glaucoma.  As appropriate for age/gender, discussed screening for colorectal cancer, prostate cancer, breast cancer, and cervical cancer. Checklist reviewing preventive services available has been given to the patient.    Reviewed patients plan of care and provided an AVS. The Basic Care Plan (routine screening as documented in Health Maintenance) for Sharad meets the Care Plan requirement. This Care Plan has been established and reviewed with the Patient.    Counseling Resources:  ATP IV Guidelines  Pooled Cohorts Equation Calculator  Breast Cancer Risk Calculator  Breast Cancer: Medication to Reduce Risk  FRAX Risk Assessment  ICSI Preventive Guidelines  Dietary Guidelines for Americans, 2010  USDA's MyPlate  ASA Prophylaxis  Lung CA Screening    Jase Rogers MD  [facility]    Identified Health Risks:

## 2021-10-27 ENCOUNTER — TRANSFERRED RECORDS (OUTPATIENT)
Dept: HEALTH INFORMATION MANAGEMENT | Facility: CLINIC | Age: 71
End: 2021-10-27
Payer: COMMERCIAL

## 2021-11-15 ENCOUNTER — TRANSFERRED RECORDS (OUTPATIENT)
Dept: HEALTH INFORMATION MANAGEMENT | Facility: CLINIC | Age: 71
End: 2021-11-15
Payer: COMMERCIAL

## 2021-11-19 ENCOUNTER — IMMUNIZATION (OUTPATIENT)
Dept: NURSING | Facility: CLINIC | Age: 71
End: 2021-11-19
Payer: COMMERCIAL

## 2021-11-19 PROCEDURE — 0064A COVID-19,PF,MODERNA (18+ YRS BOOSTER .25ML): CPT

## 2021-11-19 PROCEDURE — 91306 COVID-19,PF,MODERNA (18+ YRS BOOSTER .25ML): CPT

## 2021-11-30 DIAGNOSIS — I10 ESSENTIAL HYPERTENSION, BENIGN: ICD-10-CM

## 2021-12-01 RX ORDER — METOPROLOL SUCCINATE 50 MG/1
50 TABLET, EXTENDED RELEASE ORAL DAILY
Qty: 90 TABLET | Refills: 3 | Status: SHIPPED | OUTPATIENT
Start: 2021-12-01 | End: 2022-11-28

## 2022-05-29 DIAGNOSIS — E78.5 HYPERLIPIDEMIA, UNSPECIFIED HYPERLIPIDEMIA TYPE: ICD-10-CM

## 2022-05-29 DIAGNOSIS — R35.1 NOCTURIA: ICD-10-CM

## 2022-05-30 RX ORDER — ATORVASTATIN CALCIUM 40 MG/1
TABLET, FILM COATED ORAL
Qty: 90 TABLET | Refills: 1 | Status: SHIPPED | OUTPATIENT
Start: 2022-05-30 | End: 2022-11-25

## 2022-05-31 RX ORDER — DOXAZOSIN 4 MG/1
TABLET ORAL
Qty: 90 TABLET | Refills: 2 | Status: SHIPPED | OUTPATIENT
Start: 2022-05-31 | End: 2022-12-08

## 2022-05-31 NOTE — TELEPHONE ENCOUNTER
"Routing refill request to provider for review/approval because:  bp due    Last Written Prescription Date:  9/3/21  Last Fill Quantity: 90,  # refills: 2   Last office visit provider:  10/5/21     Requested Prescriptions   Pending Prescriptions Disp Refills     doxazosin (CARDURA) 4 MG tablet [Pharmacy Med Name: DOXAZOSIN 4MG TABLETS] 90 tablet 2     Sig: TAKE 1 TABLET(4 MG) BY MOUTH AT BEDTIME       Antiadrenergic Antihypertensives Failed - 5/29/2022  5:14 AM        Failed - Blood pressure less than 140/90 in past 6 months     BP Readings from Last 3 Encounters:   10/05/21 120/82   04/05/21 120/80   09/18/20 138/86                 Failed - Recent (6 mo) or future (30 days) visit within the authorizing provider's specialty     Patient had office visit in the last 6 months or has a visit in the next 30 days with authorizing provider or within the authorizing provider's specialty.  See \"Patient Info\" tab in inbasket, or \"Choose Columns\" in Meds & Orders section of the refill encounter.            Passed - Medication is active on med list        Passed - Patient is age 18 or older        Passed - Normal serum creatinine on file in past 12 months     Recent Labs   Lab Test 10/05/21  0938   CR 0.86       Ok to refill medication if creatinine is low         Alpha Blockers Passed - 5/29/2022  5:14 AM        Passed - Blood pressure under 140/90 in past 12 months     BP Readings from Last 3 Encounters:   10/05/21 120/82   04/05/21 120/80   09/18/20 138/86                 Passed - Recent (12 mo) or future (30 days) visit within the authorizing provider's specialty     Patient has had an office visit with the authorizing provider or a provider within the authorizing providers department within the previous 12 mos or has a future within next 30 days. See \"Patient Info\" tab in inbasket, or \"Choose Columns\" in Meds & Orders section of the refill encounter.              Passed - Patient does not have Tadalafil, Vardenafil, or " "Sildenafil on their medication list        Passed - Medication is active on med list        Passed - Patient is 18 years of age or older           atorvastatin (LIPITOR) 40 MG tablet [Pharmacy Med Name: ATORVASTATIN 40MG TABLETS] 90 tablet 2     Sig: TAKE 1 TABLET(40 MG) BY MOUTH AT BEDTIME       Statins Protocol Passed - 5/29/2022  5:14 AM        Passed - LDL on file in past 12 months     Recent Labs   Lab Test 10/05/21  0938                Passed - No abnormal creatine kinase in past 12 months     No lab results found.             Passed - Recent (12 mo) or future (30 days) visit within the authorizing provider's specialty     Patient has had an office visit with the authorizing provider or a provider within the authorizing providers department within the previous 12 mos or has a future within next 30 days. See \"Patient Info\" tab in inbasket, or \"Choose Columns\" in Meds & Orders section of the refill encounter.              Passed - Medication is active on med list        Passed - Patient is age 18 or older             Claudia Justice RN 05/30/22 9:06 PM  "

## 2022-06-07 NOTE — PROGRESS NOTES
ASSESSMENT & PLAN    Sharad was seen today for pain.    Diagnoses and all orders for this visit:    Primary osteoarthritis of right hip  -     XR Pelvis w Hip RT 1 View; Future    Right hip pain  -     MR Hip Right w/o Contrast; Future      Some arthrosis on x-rays.  He desires further imaging, given question of groin pain related to hip, though I think this is from OA. Will proceed with MRI.  Questions answered. Discussed signs and symptoms that may indicate more serious issues; the patient was instructed to seek appropriate care if noted. Damien indicates understanding of these issues and agrees with the plan.      See Patient Instructions  Patient Instructions   Right groin pain appears to be from the hip joint.  X-rays appear to show at least mild underlying degenerative change, or osteoarthritis.  We reviewed options with continued physical therapy, additional imaging with MRI, use of medication for symptoms, potential for trial of bracing, and imaging guided steroid injection.  Following discussion, plan MRI of the right hip next.  From there, plan to contact you with results.  If imaging demonstrates hip OA, likely referral on for imaging guided steroid injection to the hip joint.  Keep up with home exercises from therapy in the meantime.    Advanced imaging is done by appointment. Please call [place], [place] and [place]: [phone] to schedule your MRI.     Depending on your availability you can usually schedule within the next 1-2 days.    Some insurance companies may require a prior authorization to be completed which can delay the time until you are able to schedule your appointment.       If you are active on [employer], you may have access to your test results before your provider is able to review the study and advise on next steps.      The clinic will call you with results. If you have not heard from the clinic within 2-3 days following your MRI, please contact us at the number listed  "below.      If you have any further questions for your physician or physician s care team you can call [phone] and use option 3 to leave a voice message. Calls received during business hours will be returned same day.        DO DEVIN Rosas [facility] [place]    -----  Chief Complaint   Patient presents with     Right Hip - Pain       SUBJECTIVE  Sharad Yi is a/an 72 year old adult who is seen as a self referral for evaluation of right hip joint pain.     The patient is seen by themselves.    Onset: 3 week(s) ago. 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  Pain in right groin. Mild improvement with PT.  In total pain for ~2 months.  No pain at rest currently.  One incident of hip area pop sensation, otherwise no routine joint noise.  Limping due to pain.    **  History right knee issues, meniscus tear.  Some ongoing right knee pain. Has had injection there.        REVIEW OF SYSTEMS:  Review of Systems      OBJECTIVE:  /82   Ht 1.72 m (5' 7.7\")   Wt 118.8 kg (262 lb)   BMI 40.19 kg/m     General: healthy, alert and in no distress  HEENT: no scleral icterus or conjunctival erythema  Skin: no suspicious lesions or rash. No jaundice.  CV: distal perfusion intact   Resp: normal respiratory effort without conversational dyspnea   Psych: normal mood and affect  Gait: antalgic  Neuro: Normal light sensory exam of extremity       Right hip exam    ROM:     Flexion mild limitation actively compared to left       " internal rotation ~5-10      external rotation ~45      Range of motion limited by pain, tightness    Strength: no change in pain with resisted flexion, abduction, adduction    Tender: minimal proximal adductor musculature, anterior hip    Non Tender:      remainder of hip area    Sensation:      grossly intact in hip and thigh    Special Tests:      positive (+) FADIR       Log roll pos        RADIOLOGY:  I independently ordered, visualized and reviewed these images with the patient  Mild right hip joint space narrowing compared to left, consistent with underlying DJD.    Recent Results (from the past 24 hour(s))   XR Pelvis w Hip RT 1 View    Narrative    PELVIS AND HIP RIGHT ONE VIEW   6/10/2022 10:42 AM     HISTORY: Primary osteoarthritis of right hip.    COMPARISON: None.      Impression    IMPRESSION: Mild right hip joint space narrowing. There is no evidence  of fracture or osteonecrosis. Calcification projecting over the low  central pelvis may be a urinary bladder stone or an overlying soft  tissue calcification. Otherwise unremarkable.    FRANCES MORGAN MD         SYSTEM ID:  [id number]

## 2022-06-10 ENCOUNTER — OFFICE VISIT (OUTPATIENT)
Dept: ORTHOPEDICS | Facility: CLINIC | Age: 72
End: 2022-06-10

## 2022-06-10 ENCOUNTER — ANCILLARY PROCEDURE (OUTPATIENT)
Dept: GENERAL RADIOLOGY | Facility: CLINIC | Age: 72
End: 2022-06-10
Attending: PEDIATRICS
Payer: COMMERCIAL

## 2022-06-10 VITALS
SYSTOLIC BLOOD PRESSURE: 134 MMHG | WEIGHT: 262 LBS | HEIGHT: 68 IN | DIASTOLIC BLOOD PRESSURE: 82 MMHG | BODY MASS INDEX: 39.71 KG/M2

## 2022-06-10 DIAGNOSIS — M25.551 RIGHT HIP PAIN: ICD-10-CM

## 2022-06-10 DIAGNOSIS — M16.11 PRIMARY OSTEOARTHRITIS OF RIGHT HIP: ICD-10-CM

## 2022-06-10 DIAGNOSIS — M16.11 PRIMARY OSTEOARTHRITIS OF RIGHT HIP: Primary | ICD-10-CM

## 2022-06-10 PROCEDURE — 73502 X-RAY EXAM HIP UNI 2-3 VIEWS: CPT | Mod: TC | Performed by: RADIOLOGY

## 2022-06-10 PROCEDURE — 99203 OFFICE O/P NEW LOW 30 MIN: CPT | Performed by: PEDIATRICS

## 2022-06-10 NOTE — PATIENT INSTRUCTIONS
Right groin pain appears to be from the hip joint.  X-rays appear to show at least mild underlying degenerative change, or osteoarthritis.  We reviewed options with continued physical therapy, additional imaging with MRI, use of medication for symptoms, potential for trial of bracing, and imaging guided steroid injection.  Following discussion, plan MRI of the right hip next.  From there, plan to contact you with results.  If imaging demonstrates hip OA, likely referral on for imaging guided steroid injection to the hip joint.  Keep up with home exercises from therapy in the meantime.    Advanced imaging is done by appointment. Please call [place], [place] and [place]: [phone] to schedule your MRI.     Depending on your availability you can usually schedule within the next 1-2 days.    Some insurance companies may require a prior authorization to be completed which can delay the time until you are able to schedule your appointment.       If you are active on [employer], you may have access to your test results before your provider is able to review the study and advise on next steps.      The clinic will call you with results. If you have not heard from the clinic within 2-3 days following your MRI, please contact us at the number listed below.      If you have any further questions for your physician or physician s care team you can call [phone] and use option 3 to leave a voice message. Calls received during business hours will be returned same day.

## 2022-06-10 NOTE — LETTER
6/10/2022         RE: Sharad Yi  [address]  [facility] [address]        Dear Colleague,    Thank you for referring your patient, Sharad Yi, to the [facility]. Please see a copy of my visit note below.    ASSESSMENT & PLAN    Sharad was seen today for pain.    Diagnoses and all orders for this visit:    Primary osteoarthritis of right hip  -     XR Pelvis w Hip RT 1 View; Future    Right hip pain  -     MR Hip Right w/o Contrast; Future      Some arthrosis on x-rays.  He desires further imaging, given question of groin pain related to hip, though I think this is from OA. Will proceed with MRI.  Questions answered. Discussed signs and symptoms that may indicate more serious issues; the patient was instructed to seek appropriate care if noted. Damien indicates understanding of these issues and agrees with the plan.      See Patient Instructions  Patient Instructions   Right groin pain appears to be from the hip joint.  X-rays appear to show at least mild underlying degenerative change, or osteoarthritis.  We reviewed options with continued physical therapy, additional imaging with MRI, use of medication for symptoms, potential for trial of bracing, and imaging guided steroid injection.  Following discussion, plan MRI of the right hip next.  From there, plan to contact you with results.  If imaging demonstrates hip OA, likely referral on for imaging guided steroid injection to the hip joint.  Keep up with home exercises from therapy in the meantime.    Advanced imaging is done by appointment. Please call [facility] and Sonny: [phone] to schedule your MRI.     Depending on your availability you can usually schedule within the next 1-2 days.    Some insurance companies may require a prior authorization to be completed which can delay the time until you are able to schedule your appointment.       If you are active on [employer], you may have  "access to your test results before your provider is able to review the study and advise on next steps.      The clinic will call you with results. If you have not heard from the clinic within 2-3 days following your MRI, please contact us at the number listed below.      If you have any further questions for your physician or physician s care team you can call [phone] and use option 3 to leave a voice message. Calls received during business hours will be returned same day.        Alejandro Baumann DO  [facility]    -----  Chief Complaint   Patient presents with     Right Hip - Pain       SUBJECTIVE  Sharad Yi is a/an 72 year old adult who is seen as a self referral for evaluation of right hip joint pain.     The patient is seen by themselves.    Onset: 3 week(s) ago. 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  Pain in right groin. Mild improvement with PT.  In total pain for ~2 months.  No pain at rest currently.  One incident of hip area pop sensation, otherwise no routine joint noise.  Limping due to pain.    **  History right knee issues, meniscus tear.  Some ongoing right knee pain. Has had injection there.        REVIEW OF SYSTEMS:  Review of Systems      OBJECTIVE:  /82   Ht 1.72 m (5' 7.7\")   Wt 118.8 kg (262 lb)   BMI 40.19 kg/m     General: healthy, alert and in no distress  HEENT: no scleral icterus or conjunctival erythema  Skin: no suspicious lesions or rash. No jaundice.  CV: distal " perfusion intact   Resp: normal respiratory effort without conversational dyspnea   Psych: normal mood and affect  Gait: antalgic  Neuro: Normal light sensory exam of extremity       Right hip exam    ROM:     Flexion mild limitation actively compared to left       internal rotation ~5-10      external rotation ~45      Range of motion limited by pain, tightness    Strength: no change in pain with resisted flexion, abduction, adduction    Tender: minimal proximal adductor musculature, anterior hip    Non Tender:      remainder of hip area    Sensation:      grossly intact in hip and thigh    Special Tests:      positive (+) FADIR       Log roll pos        RADIOLOGY:  I independently ordered, visualized and reviewed these images with the patient  Mild right hip joint space narrowing compared to left, consistent with underlying DJD.    Recent Results (from the past 24 hour(s))   XR Pelvis w Hip RT 1 View    Narrative    PELVIS AND HIP RIGHT ONE VIEW   6/10/2022 10:42 AM     HISTORY: Primary osteoarthritis of right hip.    COMPARISON: None.      Impression    IMPRESSION: Mild right hip joint space narrowing. There is no evidence  of fracture or osteonecrosis. Calcification projecting over the low  central pelvis may be a urinary bladder stone or an overlying soft  tissue calcification. Otherwise unremarkable.    FRANCES MORGAN MD         SYSTEM ID:  [id number]               Again, thank you for allowing me to participate in the care of your patient.        Sincerely,        Alejandro Baumann DO

## 2022-06-13 ENCOUNTER — TELEPHONE (OUTPATIENT)
Dept: ORTHOPEDICS | Facility: CLINIC | Age: 72
End: 2022-06-13

## 2022-06-13 ENCOUNTER — ANCILLARY PROCEDURE (OUTPATIENT)
Dept: MRI IMAGING | Facility: CLINIC | Age: 72
End: 2022-06-13
Attending: PEDIATRICS
Payer: COMMERCIAL

## 2022-06-13 DIAGNOSIS — M25.551 RIGHT HIP PAIN: ICD-10-CM

## 2022-06-13 PROCEDURE — 73721 MRI JNT OF LWR EXTRE W/O DYE: CPT | Mod: RT | Performed by: RADIOLOGY

## 2022-06-13 NOTE — TELEPHONE ENCOUNTER
Results for orders placed or performed in visit on 06/13/22   MR Hip Right w/o Contrast    Narrative    MR right hip without contrast 6/13/2022 9:23 AM    Techniques: Multiplanar multisequence imaging of the right hip was  obtained without  administration of intra-articular or intravenous  contrast using routine protocol.    History: Right hip pain     Comparison: 6/10/2022    Findings:    Low signal-to-noise ratio particularly on the fluid sensitive fat  suppressed sequences compromising assessment.    Osseous structures  Osseous structures: No fracture, stress reaction, avascular necrosis,  or focal osseous lesion is seen.    Enthesopathic change of the greater trochanter and innominate bones..    Articular cartilage and labrum  Assessment limited on this non-arthrographic study due to relative  lack of joint distension.    Articular cartilage: Subchondral cystlike change and edema like marrow  signal intensity at the anterosuperior acetabulum with overlying  full-thickness chondral loss. Also suspected high-grade to  full-thickness chondral loss of the femoral head with associated  superior-medial joint space loss.    Labrum: No tear suggested on this non-arthrographic study.    Ligament teres and transverse ligament of acetabulum: Ligamentum teres  likely chronically torn. Transverse ligament of acetabulum is intact.    Joint or bursal effusion    Joint effusion: Small to moderate hip joint effusion with mild  internal debris, presumably degenerative.    Bursal effusion: Minimal nonspecific edema over the greater  trochanter. No substantial iliopsoas or trochanteric bursal effusion.    Muscles and tendons  Muscles and tendons: Edema deep to and distal right iliacus muscle and  edema of right adductor muscles, consistent with strain. Proximal  hamstrings, rectus femoris, sartorius, hip abductors and iliopsoas  tendons are grossly intact. Focal moderate to severe fatty replacement  gluteus minimus.      Nerves:  The visualized course of the sciatic nerve is unremarkable.    Other Findings:  Enlarged prostate with nodularity and cystlike area, incompletely  visualized measuring at least 5.2 cm in mediolateral dimension, most  consistent with benign prostatic hyperplasia. Intraluminal 12 mm  diameter hypointensity within the bladder with corresponding  radiographic sclerosis, likely bladder calculi.    Scattered nonenlarged right external iliac chain and inguinal lymph  nodes. Fat extension into the right inguinal canal.      Impression    Impression:  1. Severe right hip osteoarthrosis with full thickness chondral loss  and subchondral abnormality.  2. Strain right adductor and iliacus.  3. Enlarged prostate.  4. Bladder calculi.    [employer]         SYSTEM ID:  [id number]

## 2022-06-15 NOTE — TELEPHONE ENCOUNTER
"Findings on MRI include severe right hip osteoarthritis (degenerative type, or \"wear and tear\"); also with findings of hip strain (some edema in adductor and iliacus muscles); also with enlarged prostate (incidental finding).  Re: hip/groin pain, I suspect it is more from the OA, but there is some strain pattern as well.  Options: 1) trial physical therapy (for strain, and may benefit OA); 2) trial of imaging-guided steroid injection to the right hip (for pain relief from OA; would have him see one of my colleagues for use of US).  If PT, monitor 1 month to start.  If injection, would have him see Dr Paris or Dr Mejía. From there, he should contact clinic with update or follow-up 2-3 weeks after if not improving as desired.  Re: prostate finding, likely benign prostatic hyperplasia (BPH). He has had routine PSA screenings, with most recent 10/5/21. These have been normal for the past 6 years. So, I think he can monitor and discuss with his PCP at his next annual physical.  I would be happy to have a visit with the patient (in person, by video, or by phone) to discuss further if that would be helpful.  Thanks.  Alejandro Baumann, , CAQ    " pt presented to ed with c/o LLQ pain that started 2 wks ago. pt pt went to [facility] on Sunday and had CT abdomen yesterday and was told to come to ED for evaluation. pt reports vaginal bleeding, but denies nausea, vomiting, fever, chills, sob, chest pain, headache, dizziness. Will continue to monitor. pt presented to ed with c/o LLQ pain that started 2 wks ago. pt went to [facility] on Sunday and had CT abdomen yesterday and was told to come to ED for evaluation. pt reports vaginal bleeding, but denies nausea, vomiting, fever, chills, sob, chest pain, headache, dizziness. Will continue to monitor.

## 2022-06-24 ENCOUNTER — OFFICE VISIT (OUTPATIENT)
Dept: ORTHOPEDICS | Facility: CLINIC | Age: 72
End: 2022-06-24
Payer: COMMERCIAL

## 2022-06-24 DIAGNOSIS — M16.11 PRIMARY OSTEOARTHRITIS OF RIGHT HIP: Primary | ICD-10-CM

## 2022-06-24 PROCEDURE — 20611 DRAIN/INJ JOINT/BURSA W/US: CPT | Mod: RT | Performed by: FAMILY MEDICINE

## 2022-06-24 RX ORDER — ROPIVACAINE HYDROCHLORIDE 5 MG/ML
2 INJECTION, SOLUTION EPIDURAL; INFILTRATION; PERINEURAL
Status: DISCONTINUED | OUTPATIENT
Start: 2022-06-24 | End: 2022-07-18

## 2022-06-24 RX ORDER — BETAMETHASONE SODIUM PHOSPHATE AND BETAMETHASONE ACETATE 3; 3 MG/ML; MG/ML
6 INJECTION, SUSPENSION INTRA-ARTICULAR; INTRALESIONAL; INTRAMUSCULAR; SOFT TISSUE
Status: DISCONTINUED | OUTPATIENT
Start: 2022-06-24 | End: 2022-07-18

## 2022-06-24 RX ADMIN — BETAMETHASONE SODIUM PHOSPHATE AND BETAMETHASONE ACETATE 6 MG: 3; 3 INJECTION, SUSPENSION INTRA-ARTICULAR; INTRALESIONAL; INTRAMUSCULAR; SOFT TISSUE at 09:15

## 2022-06-24 RX ADMIN — ROPIVACAINE HYDROCHLORIDE 2 ML: 5 INJECTION, SOLUTION EPIDURAL; INFILTRATION; PERINEURAL at 09:15

## 2022-06-24 NOTE — PROGRESS NOTES
Large Joint Injection/Arthocentesis: R hip joint    Date/Time: 6/24/2022 9:15 AM  Performed by: Ollie Mejía MD  Authorized by: Ollie Mejía MD     Indications:  Pain and osteoarthritis  Needle Size:  22 G  Guidance: ultrasound    Approach:  Anterior  Location:  Hip      Site:  R hip joint  Medications:  2 mL ropivacaine 5 MG/ML; 6 mg betamethasone acet & sod phos 6 (3-3) MG/ML  Outcome:  Tolerated well, no immediate complications  Procedure discussed: discussed risks, benefits, and alternatives    Consent Given by:  Patient  Timeout: timeout called immediately prior to procedure    Prep: patient was prepped and draped in usual sterile fashion     Ultrasound images of procedure were permanently stored.    Referred by Dr. Baumann     Patient reported significant improvement of pain after the numbing portion right hip joint steroid injection.  Ultrasound guided images were permanently stored.  Aftercare instructions given to patient.  Plan to follow-up as previously discussed with referring provider.     Ollie Mejía MD [facility]

## 2022-06-24 NOTE — PATIENT INSTRUCTIONS
[facility] Injection Discharge Instructions    Procedure: Right hip joint steroid injection       You may shower, however avoid swimming, tub baths or hot tubs for 24 hours following your procedure  You may have a mild to moderate increase in pain for several days following the injection.  It may take up to 14 days for the steroid medication to start working although you may feel the effect as early as a few days after the procedure.  You may use ice packs for 10-15 minutes, 3 to 4 times a day at the injection site for comfort  You may use anti-inflammatory medications (such as Ibuprofen or Aleve or Advil) or Tylenol for pain control if necessary  If you were fasting, you may resume your normal diet and medications after the procedure  If you have diabetes, check your blood sugar more frequently than usual as your blood sugar may be higher than normal for 10-14 days following a steroid injection. Contact your doctor who manages your diabetes if your blood sugar is higher than usual    If you experience any of the following, call [facility] @ [phone] or [phone]  -Fever over 100 degree F  -Swelling, bleeding, redness, drainage, warmth at the injection site  - New or worsening pain

## 2022-06-24 NOTE — LETTER
6/24/2022         RE: Sharad Yi  [address]  [facility] [address]        Dear Colleague,    Thank you for referring your patient, Sharad Yi, to the [facility]. Please see a copy of my visit note below.    Large Joint Injection/Arthocentesis: R hip joint    Date/Time: 6/24/2022 9:15 AM  Performed by: Ollie Mejía MD  Authorized by: Ollie Mejía MD     Indications:  Pain and osteoarthritis  Needle Size:  22 G  Guidance: ultrasound    Approach:  Anterior  Location:  Hip      Site:  R hip joint  Medications:  2 mL ropivacaine 5 MG/ML; 6 mg betamethasone acet & sod phos 6 (3-3) MG/ML  Outcome:  Tolerated well, no immediate complications  Procedure discussed: discussed risks, benefits, and alternatives    Consent Given by:  Patient  Timeout: timeout called immediately prior to procedure    Prep: patient was prepped and draped in usual sterile fashion     Ultrasound images of procedure were permanently stored.    Referred by Dr. Baumann     Patient reported significant improvement of pain after the numbing portion right hip joint steroid injection.  Ultrasound guided images were permanently stored.  Aftercare instructions given to patient.  Plan to follow-up as previously discussed with referring provider.     Ollie Mejía MD [facility]              Again, thank you for allowing me to participate in the care of your patient.        Sincerely,        Olile Mejía MD

## 2022-07-18 ENCOUNTER — OFFICE VISIT (OUTPATIENT)
Dept: ORTHOPEDICS | Facility: CLINIC | Age: 72
End: 2022-07-18
Payer: COMMERCIAL

## 2022-07-18 VITALS
DIASTOLIC BLOOD PRESSURE: 84 MMHG | WEIGHT: 262 LBS | BODY MASS INDEX: 39.71 KG/M2 | HEIGHT: 68 IN | SYSTOLIC BLOOD PRESSURE: 132 MMHG

## 2022-07-18 DIAGNOSIS — S76.011D STRAIN OF RIGHT HIP, SUBSEQUENT ENCOUNTER: ICD-10-CM

## 2022-07-18 DIAGNOSIS — M16.11 PRIMARY OSTEOARTHRITIS OF RIGHT HIP: Primary | ICD-10-CM

## 2022-07-18 PROCEDURE — 99213 OFFICE O/P EST LOW 20 MIN: CPT | Performed by: PEDIATRICS

## 2022-07-18 NOTE — PROGRESS NOTES
ASSESSMENT & PLAN    Sharad was seen today for recheck.    Diagnoses and all orders for this visit:    Primary osteoarthritis of right hip  -     Physical Therapy Referral; Future    Strain of right hip, subsequent encounter  -     Physical Therapy Referral; Future      Strain and arthrosis.  Benefit from local anesthetic from injection would indicate joint source for pain, but steroid did not ultimately benefit. (Of note, pt previously had knee steroid injection x 2 without benefit, ultimately had surgery for knee issues; he questions whether he simply does not respond favorably to steroid injection.)  Plan repeat injection and return to PT (was doing some therapy prior to last visit with me; will return, now with MRI findings noted, not necessarily that MRI results will change therapy but could).  Questions answered. Discussed signs and symptoms that may indicate more serious issues; the patient was instructed to seek appropriate care if noted. Damien indicates understanding of these issues and agrees with the plan.        See Patient Instructions  Patient Instructions   We reviewed the right hip MRI, demonstrating some underlying arthritis, as well as some muscular strain in the area.  Reviewed options with injection, physical therapy, potential ultimately referral on to talk with orthopedic surgery given the underlying arthritis.  Regarding the last option, prefer to hold off with that for as long as possible, focus on other management.  Plan repeat right hip joint steroid injection next, given good initial relief from the local anesthetic component of the injection.  Also plan additional physical therapy, now that MRI has been obtained in the interim.  Provided report of the MRI findings today as well.  Plan to monitor course with injection therapy for at least a month.  Contact clinic for any questions or concerns.    If you have any further questions for your physician or physician s care team you can call  "[phone] and use option 3 to leave a voice message. Calls received during business hours will be returned same day.        [facility]    SUBJECTIVE- Interim History July 18, 2022    Chief Complaint   Patient presents with     Right Hip - RECHECK       Sharad Yi is a 72 year old adult who is seen in f/u up for Primary osteoarthritis of right hip. Since last visit on 6/10/22 patient has undergone a MRI and an US guided right hip injection with Xenia Mejía on 6/24/22.  Did have minimal relief with the injection.  Does still have pain with crossing his legs and bending over to get his socks on.   After sitting for a long period of time,he does feel a catch in his hip when he begins walking.    Still has pain in his groin, states it feels like a groin pull.    Has been using ibuprofen.          REVIEW OF SYSTEMS:  Review of Systems      OBJECTIVE:  /84   Ht 1.721 m (5' 7.75\")   Wt 118.8 kg (262 lb)   BMI 40.13 kg/m         Right hip:  Some pain with joint motion, also notes some pain into adductor musculature area      RADIOLOGY:  Final results and radiologist's interpretation, available in the [facility] health record.  Images were reviewed with the patient in the office today.  My personal interpretation of the performed imaging: hip arthrosis and muscular strain as noted.    Results for orders placed or performed in visit on 06/13/22   MR Hip Right w/o Contrast    Narrative    MR right hip without contrast 6/13/2022 9:23 AM    Techniques: Multiplanar multisequence imaging of the right hip was  obtained without  administration of intra-articular or intravenous  contrast using routine protocol.    History: Right hip pain     Comparison: 6/10/2022    Findings:    Low signal-to-noise ratio particularly on the fluid sensitive fat  suppressed sequences compromising assessment.    Osseous structures  Osseous structures: No fracture, stress " reaction, avascular necrosis,  or focal osseous lesion is seen.    Enthesopathic change of the greater trochanter and innominate bones..    Articular cartilage and labrum  Assessment limited on this non-arthrographic study due to relative  lack of joint distension.    Articular cartilage: Subchondral cystlike change and edema like marrow  signal intensity at the anterosuperior acetabulum with overlying  full-thickness chondral loss. Also suspected high-grade to  full-thickness chondral loss of the femoral head with associated  superior-medial joint space loss.    Labrum: No tear suggested on this non-arthrographic study.    Ligament teres and transverse ligament of acetabulum: Ligamentum teres  likely chronically torn. Transverse ligament of acetabulum is intact.    Joint or bursal effusion    Joint effusion: Small to moderate hip joint effusion with mild  internal debris, presumably degenerative.    Bursal effusion: Minimal nonspecific edema over the greater  trochanter. No substantial iliopsoas or trochanteric bursal effusion.    Muscles and tendons  Muscles and tendons: Edema deep to and distal right iliacus muscle and  edema of right adductor muscles, consistent with strain. Proximal  hamstrings, rectus femoris, sartorius, hip abductors and iliopsoas  tendons are grossly intact. Focal moderate to severe fatty replacement  gluteus minimus.     Nerves:  The visualized course of the sciatic nerve is unremarkable.    Other Findings:  Enlarged prostate with nodularity and cystlike area, incompletely  visualized measuring at least 5.2 cm in mediolateral dimension, most  consistent with benign prostatic hyperplasia. Intraluminal 12 mm  diameter hypointensity within the bladder with corresponding  radiographic sclerosis, likely bladder calculi.    Scattered nonenlarged right external iliac chain and inguinal lymph  nodes. Fat extension into the right inguinal canal.      Impression    Impression:  1. Severe right hip  osteoarthrosis with full thickness chondral loss  and subchondral abnormality.  2. Strain right adductor and iliacus.  3. Enlarged prostate.  4. Bladder calculi.    [employer]         SYSTEM ID:  [id number]

## 2022-07-18 NOTE — PATIENT INSTRUCTIONS
We reviewed the right hip MRI, demonstrating some underlying arthritis, as well as some muscular strain in the area.  Reviewed options with injection, physical therapy, potential ultimately referral on to talk with orthopedic surgery given the underlying arthritis.  Regarding the last option, prefer to hold off with that for as long as possible, focus on other management.  Plan repeat right hip joint steroid injection next, given good initial relief from the local anesthetic component of the injection.  Also plan additional physical therapy, now that MRI has been obtained in the interim.  Provided report of the MRI findings today as well.  Plan to monitor course with injection therapy for at least a month.  Contact clinic for any questions or concerns.    If you have any further questions for your physician or physician s care team you can call [phone] and use option 3 to leave a voice message. Calls received during business hours will be returned same day.

## 2022-07-18 NOTE — LETTER
7/18/2022         RE: Sharad Yi  [address]  [facility] [address]        Dear Colleague,    Thank you for referring your patient, Sharad Yi, to the [facility]. Please see a copy of my visit note below.    ASSESSMENT & PLAN    Sharad was seen today for recheck.    Diagnoses and all orders for this visit:    Primary osteoarthritis of right hip  -     Physical Therapy Referral; Future    Strain of right hip, subsequent encounter  -     Physical Therapy Referral; Future      Strain and arthrosis.  Benefit from local anesthetic from injection would indicate joint source for pain, but steroid did not ultimately benefit. (Of note, pt previously had knee steroid injection x 2 without benefit, ultimately had surgery for knee issues; he questions whether he simply does not respond favorably to steroid injection.)  Plan repeat injection and return to PT (was doing some therapy prior to last visit with me; will return, now with MRI findings noted, not necessarily that MRI results will change therapy but could).  Questions answered. Discussed signs and symptoms that may indicate more serious issues; the patient was instructed to seek appropriate care if noted. Damien indicates understanding of these issues and agrees with the plan.        See Patient Instructions  Patient Instructions   We reviewed the right hip MRI, demonstrating some underlying arthritis, as well as some muscular strain in the area.  Reviewed options with injection, physical therapy, potential ultimately referral on to talk with orthopedic surgery given the underlying arthritis.  Regarding the last option, prefer to hold off with that for as long as possible, focus on other management.  Plan repeat right hip joint steroid injection next, given good initial relief from the local anesthetic component of the injection.  Also plan additional physical therapy, now that MRI has been obtained in the interim.  " Provided report of the MRI findings today as well.  Plan to monitor course with injection therapy for at least a month.  Contact clinic for any questions or concerns.    If you have any further questions for your physician or physician s care team you can call [phone] and use option 3 to leave a voice message. Calls received during business hours will be returned same day.        Alejandro Baumann [facility]    SUBJECTIVE- Interim History July 18, 2022    Chief Complaint   Patient presents with     Right Hip - RECHECK       Sharad Yi is a 72 year old adult who is seen in f/u up for Primary osteoarthritis of right hip. Since last visit on 6/10/22 patient has undergone a MRI and an US guided right hip injection with Xenia Mejía on 6/24/22.  Did have minimal relief with the injection.  Does still have pain with crossing his legs and bending over to get his socks on.   After sitting for a long period of time,he does feel a catch in his hip when he begins walking.    Still has pain in his groin, states it feels like a groin pull.    Has been using ibuprofen.          REVIEW OF SYSTEMS:  Review of Systems      OBJECTIVE:  /84   Ht 1.721 m (5' 7.75\")   Wt 118.8 kg (262 lb)   BMI 40.13 kg/m         Right hip:  Some pain with joint motion, also notes some pain into adductor musculature area      RADIOLOGY:  Final results and radiologist's interpretation, available in the [facility] health record.  Images were reviewed with the patient in the office today.  My personal interpretation of the performed imaging: hip arthrosis and muscular strain as noted.    Results for orders placed or performed in visit on 06/13/22   MR Hip Right w/o Contrast    Narrative    MR right hip without contrast 6/13/2022 9:23 AM    Techniques: Multiplanar multisequence imaging of the right hip was  obtained without  administration of intra-articular or intravenous  contrast using routine " protocol.    History: Right hip pain     Comparison: 6/10/2022    Findings:    Low signal-to-noise ratio particularly on the fluid sensitive fat  suppressed sequences compromising assessment.    Osseous structures  Osseous structures: No fracture, stress reaction, avascular necrosis,  or focal osseous lesion is seen.    Enthesopathic change of the greater trochanter and innominate bones..    Articular cartilage and labrum  Assessment limited on this non-arthrographic study due to relative  lack of joint distension.    Articular cartilage: Subchondral cystlike change and edema like marrow  signal intensity at the anterosuperior acetabulum with overlying  full-thickness chondral loss. Also suspected high-grade to  full-thickness chondral loss of the femoral head with associated  superior-medial joint space loss.    Labrum: No tear suggested on this non-arthrographic study.    Ligament teres and transverse ligament of acetabulum: Ligamentum teres  likely chronically torn. Transverse ligament of acetabulum is intact.    Joint or bursal effusion    Joint effusion: Small to moderate hip joint effusion with mild  internal debris, presumably degenerative.    Bursal effusion: Minimal nonspecific edema over the greater  trochanter. No substantial iliopsoas or trochanteric bursal effusion.    Muscles and tendons  Muscles and tendons: Edema deep to and distal right iliacus muscle and  edema of right adductor muscles, consistent with strain. Proximal  hamstrings, rectus femoris, sartorius, hip abductors and iliopsoas  tendons are grossly intact. Focal moderate to severe fatty replacement  gluteus minimus.     Nerves:  The visualized course of the sciatic nerve is unremarkable.    Other Findings:  Enlarged prostate with nodularity and cystlike area, incompletely  visualized measuring at least 5.2 cm in mediolateral dimension, most  consistent with benign prostatic hyperplasia. Intraluminal 12 mm  diameter hypointensity within the  bladder with corresponding  radiographic sclerosis, likely bladder calculi.    Scattered nonenlarged right external iliac chain and inguinal lymph  nodes. Fat extension into the right inguinal canal.      Impression    Impression:  1. Severe right hip osteoarthrosis with full thickness chondral loss  and subchondral abnormality.  2. Strain right adductor and iliacus.  3. Enlarged prostate.  4. Bladder calculi.    [email or web address]         SYSTEM ID:  [id number]             Again, thank you for allowing me to participate in the care of your patient.        Sincerely,        Alejandro Baumann DO

## 2022-07-21 ENCOUNTER — OFFICE VISIT (OUTPATIENT)
Dept: FAMILY MEDICINE | Facility: CLINIC | Age: 72
End: 2022-07-21
Payer: COMMERCIAL

## 2022-07-21 VITALS
DIASTOLIC BLOOD PRESSURE: 87 MMHG | HEART RATE: 69 BPM | SYSTOLIC BLOOD PRESSURE: 146 MMHG | OXYGEN SATURATION: 97 % | WEIGHT: 262 LBS | BODY MASS INDEX: 40.13 KG/M2 | RESPIRATION RATE: 16 BRPM | TEMPERATURE: 98 F

## 2022-07-21 DIAGNOSIS — R31.9 HEMATURIA, UNSPECIFIED TYPE: ICD-10-CM

## 2022-07-21 DIAGNOSIS — N30.01 ACUTE CYSTITIS WITH HEMATURIA: Primary | ICD-10-CM

## 2022-07-21 LAB
ALBUMIN UR-MCNC: >=300 MG/DL
APPEARANCE UR: ABNORMAL
BILIRUB UR QL STRIP: ABNORMAL
COLOR UR AUTO: ABNORMAL
GLUCOSE UR STRIP-MCNC: 100 MG/DL
HGB UR QL STRIP: ABNORMAL
KETONES UR STRIP-MCNC: NEGATIVE MG/DL
LEUKOCYTE ESTERASE UR QL STRIP: ABNORMAL
NITRATE UR QL: NEGATIVE
PH UR STRIP: 6 [PH] (ref 5–8)
RBC #/AREA URNS AUTO: >100 /HPF
RBC #/AREA URNS AUTO: >100 /HPF
SP GR UR STRIP: >=1.03 (ref 1–1.03)
UROBILINOGEN UR STRIP-ACNC: 1 E.U./DL
WBC #/AREA URNS AUTO: ABNORMAL /HPF
WBC #/AREA URNS AUTO: ABNORMAL /HPF

## 2022-07-21 PROCEDURE — 87086 URINE CULTURE/COLONY COUNT: CPT | Performed by: FAMILY MEDICINE

## 2022-07-21 PROCEDURE — 99214 OFFICE O/P EST MOD 30 MIN: CPT | Performed by: FAMILY MEDICINE

## 2022-07-21 PROCEDURE — 81001 URINALYSIS AUTO W/SCOPE: CPT | Performed by: FAMILY MEDICINE

## 2022-07-21 RX ORDER — SULFAMETHOXAZOLE/TRIMETHOPRIM 800-160 MG
1 TABLET ORAL 2 TIMES DAILY
Qty: 14 TABLET | Refills: 0 | Status: SHIPPED | OUTPATIENT
Start: 2022-07-21 | End: 2022-07-28

## 2022-07-21 NOTE — PATIENT INSTRUCTIONS
It does appear that you have a bladder infection.  I suspect this may be what is causing the blood in your urine.  I would like to treat you with an antibiotic and see if this clears up your bleeding.  However it is very important that you follow-up with your primary care provider or urology if the blood in your urine does not resolve with antibiotic treatment.

## 2022-07-21 NOTE — PROGRESS NOTES
Assessment:       Acute cystitis with hematuria    - sulfamethoxazole-trimethoprim (BACTRIM DS) 800-160 MG tablet  Dispense: 14 tablet; Refill: 0    Hematuria    - UA macro with reflex to Microscopic and Culture - Clinc Collect  - Urine Microscopic Exam  - Urine Microscopic  - Urine Culture         Plan:     Sent presenting with hematuria with large leukocyte esterase and white blood cells noted in urine sample.  I suspect that hematuria may be due to an infection and will treat with a course of Bactrim twice daily for the next 7 days.  However I would like him to monitor his symptoms closely and if his hematuria is not starting to resolve over the next 3 to 4 days stressed the importance of close follow-up with his PCP or with urology for further evaluation.  Recommend pushing fluids.  Patient is agreeable with this plan.    MEDICATIONS:   Orders Placed This Encounter   Medications     sulfamethoxazole-trimethoprim (BACTRIM DS) 800-160 MG tablet     Sig: Take 1 tablet by mouth 2 times daily for 7 days     Dispense:  14 tablet     Refill:  0       Patient Instructions   It does appear that you have a bladder infection.  I suspect this may be what is causing the blood in your urine.  I would like to treat you with an antibiotic and see if this clears up your bleeding.  However it is very important that you follow-up with your primary care provider or urology if the blood in your urine does not resolve with antibiotic treatment.          Subjective:       72 year old adult presents for evaluation of painless hematuria that started when he got up to go the bathroom at 2 AM.  He has had blood in his urine each time that he has voided since then.  He denies any burning with urination or increased urinary frequency or urgency and denies any abdominal pain or back pain.  He did have an episode of blood in his urine about 2 months ago but this only happened on 1 occasion and then resolved and has not had any problems with  it since.  Denies any trauma or any strenuous activity.    Patient Active Problem List   Diagnosis     Onychomycosis     Obesity     Hyperlipidemia     Benign Essential Hypertension     Urinary Frequency More Than Twice At Night (Nocturia)     Sebaceous Cyst     Kidney cysts     Routine general medical examination at a health care facility     Elevated PSA     Post-nasal drainage     Morbid obesity (H)       No past medical history on file.    Past Surgical History:   Procedure Laterality Date     ZZC APPENDECTOMY      Description: Appendectomy;  Recorded: 02/05/2013;       Current Outpatient Medications   Medication     atorvastatin (LIPITOR) 40 MG tablet     Cholecalciferol (VITAMIN D3 PO)     doxazosin (CARDURA) 4 MG tablet     metoprolol succinate ER (TOPROL-XL) 50 MG 24 hr tablet     betamethasone valerate (VALISONE) 0.1 % cream     No current facility-administered medications for this visit.       No Known Allergies    Family History   Problem Relation Age of Onset     No Known Problems Mother      Prostate Cancer Father 57.00     Heart Disease Father 57.00     Diabetes Father      No Known Problems Sister      No Known Problems Brother        Social History     Socioeconomic History     Marital status:      Spouse name: None     Number of children: None     Years of education: None     Highest education level: None   Tobacco Use     Smoking status: Former Smoker     Smokeless tobacco: Never Used   Substance and Sexual Activity     Alcohol use: Yes     Alcohol/week: 6.0 standard drinks     Drug use: No     Sexual activity: Yes     Partners: Female         Review of Systems  Pertinent items are noted in HPI.      Objective:     BP (!) 146/87   Pulse 69   Temp 98  F (36.7  C) (Oral)   Resp 16   Wt 118.8 kg (262 lb)   SpO2 97%   BMI 40.13 kg/m       General appearance: alert, appears stated age and cooperative  Back: No CVA tenderness.  Abdomen: soft, non-tender; bowel sounds normal; no masses,  no  organomegaly       Results for orders placed or performed in visit on 07/21/22   UA macro with reflex to Microscopic and Culture - Clinc Collect     Status: Abnormal    Specimen: Urine, Clean Catch   Result Value Ref Range    Color Urine Red (A) Colorless, Straw, Light Yellow, Yellow    Appearance Urine Cloudy (A) Clear    Glucose Urine 100  (A) Negative mg/dL    Bilirubin Urine Small (A) Negative    Ketones Urine Negative Negative mg/dL    Specific Gravity Urine >=1.030 1.005 - 1.030    Blood Urine Large (A) Negative    pH Urine 6.0 5.0 - 8.0    Protein Albumin Urine >=300 (A) Negative mg/dL    Urobilinogen Urine 1.0 0.2, 1.0 E.U./dL    Nitrite Urine Negative Negative    Leukocyte Esterase Urine Large (A) Negative   Urine Microscopic Exam     Status: Abnormal   Result Value Ref Range    RBC Urine >100 (A) 0-2 /HPF /HPF    WBC Urine 10-25 (A) 0-5 /HPF /HPF   Urine Microscopic     Status: Abnormal   Result Value Ref Range    RBC Urine >100 (A) 0-2 /HPF /HPF    WBC Urine 10-25 (A) 0-5 /HPF /HPF       This note has been dictated using voice recognition software. Any grammatical or context distortions are unintentional and inherent to the software

## 2022-07-23 LAB — BACTERIA UR CULT: NORMAL

## 2022-08-04 ENCOUNTER — OFFICE VISIT (OUTPATIENT)
Dept: ORTHOPEDICS | Facility: CLINIC | Age: 72
End: 2022-08-04
Payer: COMMERCIAL

## 2022-08-04 DIAGNOSIS — M16.11 PRIMARY OSTEOARTHRITIS OF RIGHT HIP: Primary | ICD-10-CM

## 2022-08-04 PROCEDURE — 20611 DRAIN/INJ JOINT/BURSA W/US: CPT | Mod: RT | Performed by: FAMILY MEDICINE

## 2022-08-04 RX ORDER — ROPIVACAINE HYDROCHLORIDE 5 MG/ML
2 INJECTION, SOLUTION EPIDURAL; INFILTRATION; PERINEURAL
Status: DISCONTINUED | OUTPATIENT
Start: 2022-08-04 | End: 2023-07-08

## 2022-08-04 RX ORDER — BETAMETHASONE SODIUM PHOSPHATE AND BETAMETHASONE ACETATE 3; 3 MG/ML; MG/ML
6 INJECTION, SUSPENSION INTRA-ARTICULAR; INTRALESIONAL; INTRAMUSCULAR; SOFT TISSUE
Status: SHIPPED | OUTPATIENT
Start: 2022-08-04

## 2022-08-04 RX ADMIN — ROPIVACAINE HYDROCHLORIDE 2 ML: 5 INJECTION, SOLUTION EPIDURAL; INFILTRATION; PERINEURAL at 08:00

## 2022-08-04 RX ADMIN — BETAMETHASONE SODIUM PHOSPHATE AND BETAMETHASONE ACETATE 6 MG: 3; 3 INJECTION, SUSPENSION INTRA-ARTICULAR; INTRALESIONAL; INTRAMUSCULAR; SOFT TISSUE at 08:00

## 2022-08-04 NOTE — PROGRESS NOTES
Large Joint Injection/Arthocentesis: R hip joint    Date/Time: 8/4/2022 8:00 AM  Performed by: Ollie Mejía MD  Authorized by: Ollie Mejía MD     Indications:  Pain and osteoarthritis  Needle Size:  22 G  Guidance: ultrasound    Approach:  Anterior  Location:  Hip      Site:  R hip joint  Medications:  6 mg betamethasone acet & sod phos 6 (3-3) MG/ML; 2 mL ropivacaine 5 MG/ML  Outcome:  Tolerated well, no immediate complications  Procedure discussed: discussed risks, benefits, and alternatives    Consent Given by:  Patient  Timeout: timeout called immediately prior to procedure    Prep: patient was prepped and draped in usual sterile fashion     Referred by Dr. Baumann  Ultrasound images of procedure were permanently stored.     Patient reported significant improvement of pain after the numbing portion right hip joint steroid injection.  Ultrasound guided images were permanently stored.  Aftercare instructions given to patient.  Plan to follow-up as previously discussed with referring provider.     Ollie Mejía MD [facility]

## 2022-08-04 NOTE — PATIENT INSTRUCTIONS
[facility] [place]    Procedure: Right hip joint steroid injection    You may shower, however avoid swimming, tub baths or hot tubs for 24 hours following your procedure  You may have a mild to moderate increase in pain for several days following the injection.  It may take up to 14 days for the steroid medication to start working although you may feel the effect as early as a few days after the procedure.  You may use ice packs for 10-15 minutes, 3 to 4 times a day at the injection site for comfort  You may use anti-inflammatory medications (such as Ibuprofen or Aleve or Advil) or Tylenol for pain control if necessary  If you were fasting, you may resume your normal diet and medications after the procedure  If you have diabetes, check your blood sugar more frequently than usual as your blood sugar may be higher than normal for 10-14 days following a steroid injection. Contact your doctor who manages your diabetes if your blood sugar is higher than usual    If you experience any of the following, call [facility] @ [phone] or [phone]  -Fever over 100 degree F  -Swelling, bleeding, redness, drainage, warmth at the injection site  - New or worsening pain

## 2022-08-04 NOTE — LETTER
8/4/2022         RE: Sharad Yi  [address]  [place] [address]        Dear Colleague,    Thank you for referring your patient, Sharad Yi, to the [facility]. Please see a copy of my visit note below.    Large Joint Injection/Arthocentesis: R hip joint    Date/Time: 8/4/2022 8:00 AM  Performed by: Ollie Mejía MD  Authorized by: Ollie Mejía MD     Indications:  Pain and osteoarthritis  Needle Size:  22 G  Guidance: ultrasound    Approach:  Anterior  Location:  Hip      Site:  R hip joint  Medications:  6 mg betamethasone acet & sod phos 6 (3-3) MG/ML; 2 mL ropivacaine 5 MG/ML  Outcome:  Tolerated well, no immediate complications  Procedure discussed: discussed risks, benefits, and alternatives    Consent Given by:  Patient  Timeout: timeout called immediately prior to procedure    Prep: patient was prepped and draped in usual sterile fashion     Referred by Dr. Baumann  Ultrasound images of procedure were permanently stored.     Patient reported significant improvement of pain after the numbing portion right hip joint steroid injection.  Ultrasound guided images were permanently stored.  Aftercare instructions given to patient.  Plan to follow-up as previously discussed with referring provider.     Ollie Mejía MD [facility]              Again, thank you for allowing me to participate in the care of your patient.        Sincerely,        Ollie Mejía MD

## 2022-08-22 ENCOUNTER — OFFICE VISIT (OUTPATIENT)
Dept: ORTHOPEDICS | Facility: CLINIC | Age: 72
End: 2022-08-22
Payer: COMMERCIAL

## 2022-08-22 VITALS
SYSTOLIC BLOOD PRESSURE: 122 MMHG | DIASTOLIC BLOOD PRESSURE: 78 MMHG | HEIGHT: 68 IN | WEIGHT: 261.4 LBS | BODY MASS INDEX: 39.62 KG/M2

## 2022-08-22 DIAGNOSIS — M16.11 PRIMARY OSTEOARTHRITIS OF RIGHT HIP: Primary | ICD-10-CM

## 2022-08-22 PROCEDURE — 99213 OFFICE O/P EST LOW 20 MIN: CPT | Performed by: PEDIATRICS

## 2022-08-22 NOTE — PROGRESS NOTES
ASSESSMENT & PLAN    Sharad was seen today for pain.    Diagnoses and all orders for this visit:    Primary osteoarthritis of right hip  -     Orthopedic  Referral; Future      He desires further discussion with ortho surgery given ongoing symptoms after intra-articular steroid injection x2, and has been doing therapy.    Questions answered. Discussed signs and symptoms that may indicate more serious issues; the patient was instructed to seek appropriate care if noted. Damien indicates understanding of these issues and agrees with the plan.      See Patient Instructions  Patient Instructions   With ongoing right hip pain despite injections and PT, we discussed next a referral on to see orthopedic surgery.  This referral has been placed.  Okay to continue with physical activities if comfortable and able.  Also suggest keeping up with home exercises from PT.  Otherwise, follow-up here is as needed.  Contact clinic if any questions or concerns.    If you have any further questions for your physician or physician s care team you can call [phone] and use option 3 to leave a voice message. Calls received during business hours will be returned same day.        [facility]jandro Stafford [facility]    SUBJECTIVE- Interim History August 22, 2022    Chief Complaint   Patient presents with     Right Hip - Pain       Sharad Yi is a 72 year old adult who is seen in f/u up for Primary osteoarthritis of right hip. Since last visit on 7/18/22 patient had repeat right hip joint steroid injection with Dr. Mejía 8/4/22. Noted significant improvement of pain for 2 days. Would like to referral to orthopedic surgery.     Worsened by: hip flexion   Better with: steroid injection   Treatments tried: steroid injections, heat   Associated symptoms:  Catching (improved)     The patient is seen by themselves.    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 Has had injection x 2 with Dr Mejía.      REVIEW OF SYSTEMS:  Review of Systems      OBJECTIVE:  /78   Ht 1.721 m (5' 7.75\")   Wt 118.6 kg (261 lb 6.4 oz)   BMI 40.04 kg/m       Additional not repeated with discussion today.      RADIOLOGY:      Previous imaging:    Results for orders placed or performed in visit on 06/13/22   MR Hip Right w/o Contrast    Narrative    MR right hip without contrast 6/13/2022 9:23 AM    Techniques: Multiplanar multisequence imaging of the right hip was  obtained without  administration of intra-articular or intravenous  contrast using routine protocol.    History: Right hip pain     Comparison: 6/10/2022    Findings:    Low signal-to-noise ratio particularly on the fluid sensitive fat  suppressed sequences compromising assessment.    Osseous structures  Osseous structures: No fracture, stress reaction, avascular necrosis,  or focal osseous lesion is seen.    Enthesopathic change of the greater trochanter and innominate bones..    Articular cartilage and labrum  Assessment limited on this non-arthrographic study due to relative  lack of joint distension.    Articular cartilage: Subchondral cystlike change and edema like marrow  signal intensity at the anterosuperior acetabulum with overlying  full-thickness chondral loss. Also suspected high-grade to  full-thickness chondral loss of the femoral head with associated  superior-medial joint space loss.    Labrum: No tear suggested on this non-arthrographic study.    Ligament teres and transverse ligament of acetabulum: Ligamentum teres  likely chronically torn. Transverse ligament of acetabulum is intact.    Joint or bursal effusion    Joint effusion: Small to moderate hip joint effusion with mild  internal debris, presumably degenerative.    Bursal effusion: Minimal nonspecific edema over the greater  trochanter. No substantial iliopsoas or trochanteric bursal " effusion.    Muscles and tendons  Muscles and tendons: Edema deep to and distal right iliacus muscle and  edema of right adductor muscles, consistent with strain. Proximal  hamstrings, rectus femoris, sartorius, hip abductors and iliopsoas  tendons are grossly intact. Focal moderate to severe fatty replacement  gluteus minimus.     Nerves:  The visualized course of the sciatic nerve is unremarkable.    Other Findings:  Enlarged prostate with nodularity and cystlike area, incompletely  visualized measuring at least 5.2 cm in mediolateral dimension, most  consistent with benign prostatic hyperplasia. Intraluminal 12 mm  diameter hypointensity within the bladder with corresponding  radiographic sclerosis, likely bladder calculi.    Scattered nonenlarged right external iliac chain and inguinal lymph  nodes. Fat extension into the right inguinal canal.      Impression    Impression:  1. Severe right hip osteoarthrosis with full thickness chondral loss  and subchondral abnormality.  2. Strain right adductor and iliacus.  3. Enlarged prostate.  4. Bladder calculi.    [employer]         SYSTEM ID:  [id number]         PELVIS AND HIP RIGHT ONE VIEW   6/10/2022 10:42 AM      HISTORY: Primary osteoarthritis of right hip.     COMPARISON: None.                                                                      IMPRESSION: Mild right hip joint space narrowing. There is no evidence  of fracture or osteonecrosis. Calcification projecting over the low  central pelvis may be a urinary bladder stone or an overlying soft  tissue calcification. Otherwise unremarkable.     FRANCES MORGAN MD             Review of prior external note(s) from - injections

## 2022-08-22 NOTE — PATIENT INSTRUCTIONS
With ongoing right hip pain despite injections and PT, we discussed next a referral on to see orthopedic surgery.  This referral has been placed.  Okay to continue with physical activities if comfortable and able.  Also suggest keeping up with home exercises from PT.  Otherwise, follow-up here is as needed.  Contact clinic if any questions or concerns.    If you have any further questions for your physician or physician s care team you can call [phone] and use option 3 to leave a voice message. Calls received during business hours will be returned same day.

## 2022-08-22 NOTE — LETTER
8/22/2022         RE: Sharad Yi  [address]  [facility] [address]        Dear Colleague,    Thank you for referring your patient, Sharad Yi, to the [facility]. Please see a copy of my visit note below.    ASSESSMENT & PLAN    Sharad was seen today for pain.    Diagnoses and all orders for this visit:    Primary osteoarthritis of right hip  -     Orthopedic  Referral; Future      He desires further discussion with ortho surgery given ongoing symptoms after intra-articular steroid injection x2, and has been doing therapy.    Questions answered. Discussed signs and symptoms that may indicate more serious issues; the patient was instructed to seek appropriate care if noted. Damien indicates understanding of these issues and agrees with the plan.      See Patient Instructions  Patient Instructions   With ongoing right hip pain despite injections and PT, we discussed next a referral on to see orthopedic surgery.  This referral has been placed.  Okay to continue with physical activities if comfortable and able.  Also suggest keeping up with home exercises from PT.  Otherwise, follow-up here is as needed.  Contact clinic if any questions or concerns.    If you have any further questions for your physician or physician s care team you can call [phone] and use option 3 to leave a voice message. Calls received during business hours will be returned same day.        Alejandro Baumann DO  [facility]    SUBJECTIVE- Interim History August 22, 2022    Chief Complaint   Patient presents with     Right Hip - Pain       Sharad Yi is a 72 year old adult who is seen in f/u up for Primary osteoarthritis of right hip. Since last visit on 7/18/22 patient had repeat right hip joint steroid injection with Dr. Mejía 8/4/22. Noted significant improvement of pain for 2 days. Would like to referral to orthopedic surgery.  "    Worsened by: hip flexion   Better with: steroid injection   Treatments tried: steroid injections, heat   Associated symptoms:  Catching (improved)     The patient is seen by themselves.    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 Has had injection x 2 with Dr Mejía.      REVIEW OF SYSTEMS:  Review of Systems      OBJECTIVE:  /78   Ht 1.721 m (5' 7.75\")   Wt 118.6 kg (261 lb 6.4 oz)   BMI 40.04 kg/m       Additional not repeated with discussion today.      RADIOLOGY:      Previous imaging:    Results for orders placed or performed in visit on 06/13/22   MR Hip Right w/o Contrast    Narrative    MR right hip without contrast 6/13/2022 9:23 AM    Techniques: Multiplanar multisequence imaging of the right hip was  obtained without  administration of intra-articular or intravenous  contrast using routine protocol.    History: Right hip pain     Comparison: 6/10/2022    Findings:    Low signal-to-noise ratio particularly on the fluid sensitive fat  suppressed sequences compromising assessment.    Osseous structures  Osseous structures: No fracture, stress reaction, avascular necrosis,  or focal osseous lesion is seen.    Enthesopathic change of the greater trochanter and innominate bones..    Articular cartilage and labrum  Assessment limited on this non-arthrographic study due to relative  lack of joint distension.    Articular cartilage: Subchondral cystlike change and edema like marrow  signal intensity at the anterosuperior acetabulum with overlying  full-thickness chondral loss. Also suspected high-grade to  full-thickness chondral loss of the femoral head with associated  superior-medial joint space loss.    Labrum: No tear suggested on this non-arthrographic study.    Ligament teres and transverse ligament of acetabulum: Ligamentum teres  likely chronically torn. Transverse ligament of acetabulum is " intact.    Joint or bursal effusion    Joint effusion: Small to moderate hip joint effusion with mild  internal debris, presumably degenerative.    Bursal effusion: Minimal nonspecific edema over the greater  trochanter. No substantial iliopsoas or trochanteric bursal effusion.    Muscles and tendons  Muscles and tendons: Edema deep to and distal right iliacus muscle and  edema of right adductor muscles, consistent with strain. Proximal  hamstrings, rectus femoris, sartorius, hip abductors and iliopsoas  tendons are grossly intact. Focal moderate to severe fatty replacement  gluteus minimus.     Nerves:  The visualized course of the sciatic nerve is unremarkable.    Other Findings:  Enlarged prostate with nodularity and cystlike area, incompletely  visualized measuring at least 5.2 cm in mediolateral dimension, most  consistent with benign prostatic hyperplasia. Intraluminal 12 mm  diameter hypointensity within the bladder with corresponding  radiographic sclerosis, likely bladder calculi.    Scattered nonenlarged right external iliac chain and inguinal lymph  nodes. Fat extension into the right inguinal canal.      Impression    Impression:  1. Severe right hip osteoarthrosis with full thickness chondral loss  and subchondral abnormality.  2. Strain right adductor and iliacus.  3. Enlarged prostate.  4. Bladder calculi.    iFit         SYSTEM ID:  [id number]         PELVIS AND HIP RIGHT ONE VIEW   6/10/2022 10:42 AM      HISTORY: Primary osteoarthritis of right hip.     COMPARISON: None.                                                                      IMPRESSION: Mild right hip joint space narrowing. There is no evidence  of fracture or osteonecrosis. Calcification projecting over the low  central pelvis may be a urinary bladder stone or an overlying soft  tissue calcification. Otherwise unremarkable.     FRANCES MORGAN MD             Review of prior external note(s) from - injections              Again, thank you for allowing me to participate in the care of your patient.        Sincerely,        Alejandro Baumann, DO

## 2022-09-08 ENCOUNTER — TRANSFERRED RECORDS (OUTPATIENT)
Dept: HEALTH INFORMATION MANAGEMENT | Facility: CLINIC | Age: 72
End: 2022-09-08

## 2022-10-01 ENCOUNTER — HEALTH MAINTENANCE LETTER (OUTPATIENT)
Age: 72
End: 2022-10-01

## 2022-10-28 ENCOUNTER — OFFICE VISIT (OUTPATIENT)
Dept: FAMILY MEDICINE | Facility: CLINIC | Age: 72
End: 2022-10-28
Payer: COMMERCIAL

## 2022-10-28 VITALS
OXYGEN SATURATION: 98 % | BODY MASS INDEX: 39.1 KG/M2 | DIASTOLIC BLOOD PRESSURE: 82 MMHG | SYSTOLIC BLOOD PRESSURE: 128 MMHG | RESPIRATION RATE: 17 BRPM | HEIGHT: 68 IN | TEMPERATURE: 98.1 F | WEIGHT: 258 LBS | HEART RATE: 78 BPM

## 2022-10-28 DIAGNOSIS — Z23 NEED FOR VACCINATION: ICD-10-CM

## 2022-10-28 DIAGNOSIS — Z01.818 PREOP EXAMINATION: ICD-10-CM

## 2022-10-28 DIAGNOSIS — I10 BENIGN ESSENTIAL HYPERTENSION: ICD-10-CM

## 2022-10-28 DIAGNOSIS — Z11.59 NEED FOR HEPATITIS C SCREENING TEST: Primary | ICD-10-CM

## 2022-10-28 PROBLEM — E66.9 OBESITY: Status: ACTIVE | Noted: 2020-09-18

## 2022-10-28 PROBLEM — E66.9 OBESITY: Status: RESOLVED | Noted: 2020-09-18 | Resolved: 2022-10-28

## 2022-10-28 LAB
ANION GAP SERPL CALCULATED.3IONS-SCNC: 11 MMOL/L (ref 7–15)
ATRIAL RATE - MUSE: 75 BPM
BUN SERPL-MCNC: 17.6 MG/DL (ref 8–23)
CALCIUM SERPL-MCNC: 9.5 MG/DL (ref 8.8–10.2)
CHLORIDE SERPL-SCNC: 106 MMOL/L (ref 98–107)
CREAT SERPL-MCNC: 1.01 MG/DL (ref 0.67–1.17)
DEPRECATED HCO3 PLAS-SCNC: 25 MMOL/L (ref 22–29)
DIASTOLIC BLOOD PRESSURE - MUSE: NORMAL MMHG
ERYTHROCYTE [DISTWIDTH] IN BLOOD BY AUTOMATED COUNT: 12.9 % (ref 10–15)
GFR SERPL CREATININE-BSD FRML MDRD: 79 ML/MIN/1.73M2
GLUCOSE SERPL-MCNC: 106 MG/DL (ref 70–99)
HCT VFR BLD AUTO: 43.9 % (ref 40–53)
HGB BLD-MCNC: 14.6 G/DL (ref 13.3–17.7)
INTERPRETATION ECG - MUSE: NORMAL
MCH RBC QN AUTO: 31.2 PG (ref 26.5–33)
MCHC RBC AUTO-ENTMCNC: 33.3 G/DL (ref 31.5–36.5)
MCV RBC AUTO: 94 FL (ref 78–100)
P AXIS - MUSE: 29 DEGREES
PLATELET # BLD AUTO: 206 10E3/UL (ref 150–450)
POTASSIUM SERPL-SCNC: 4.5 MMOL/L (ref 3.4–5.3)
PR INTERVAL - MUSE: 176 MS
QRS DURATION - MUSE: 140 MS
QT - MUSE: 426 MS
QTC - MUSE: 475 MS
R AXIS - MUSE: -33 DEGREES
RBC # BLD AUTO: 4.68 10E6/UL (ref 4.4–5.9)
SODIUM SERPL-SCNC: 142 MMOL/L (ref 136–145)
SYSTOLIC BLOOD PRESSURE - MUSE: NORMAL MMHG
T AXIS - MUSE: 5 DEGREES
VENTRICULAR RATE- MUSE: 75 BPM
WBC # BLD AUTO: 6.1 10E3/UL (ref 4–11)

## 2022-10-28 PROCEDURE — 91313 COVID-19,PF,MODERNA BIVALENT: CPT | Performed by: FAMILY MEDICINE

## 2022-10-28 PROCEDURE — 36415 COLL VENOUS BLD VENIPUNCTURE: CPT | Performed by: FAMILY MEDICINE

## 2022-10-28 PROCEDURE — 0134A COVID-19,PF,MODERNA BIVALENT: CPT | Performed by: FAMILY MEDICINE

## 2022-10-28 PROCEDURE — 80048 BASIC METABOLIC PNL TOTAL CA: CPT | Performed by: FAMILY MEDICINE

## 2022-10-28 PROCEDURE — 93010 ELECTROCARDIOGRAM REPORT: CPT | Performed by: INTERNAL MEDICINE

## 2022-10-28 PROCEDURE — 90662 IIV NO PRSV INCREASED AG IM: CPT | Performed by: FAMILY MEDICINE

## 2022-10-28 PROCEDURE — 93005 ELECTROCARDIOGRAM TRACING: CPT | Performed by: FAMILY MEDICINE

## 2022-10-28 PROCEDURE — 99214 OFFICE O/P EST MOD 30 MIN: CPT | Mod: 25 | Performed by: FAMILY MEDICINE

## 2022-10-28 PROCEDURE — 85027 COMPLETE CBC AUTOMATED: CPT | Performed by: FAMILY MEDICINE

## 2022-10-28 PROCEDURE — G0008 ADMIN INFLUENZA VIRUS VAC: HCPCS | Performed by: FAMILY MEDICINE

## 2022-10-28 NOTE — PROGRESS NOTES
[facility]  [address] [address]  Phone: [phone]  Fax: [phone]  Primary Provider: Jase Rogers  Pre-op Performing Provider: DOMENIC BROWN       PREOPERATIVE EVALUATION:  Today's date: 10/28/2022    Sharad Yi is a 72 year old male who presents for a preoperative evaluation.    Surgical Information:  Surgery/Procedure: Right Hip replacement   Surgery Location: [facility]  Surgeon: Refugio Dueñas  Surgery Date: 11/18/22  Time of Surgery: pending  Where patient plans to recover: At home with family  Fax number for surgical facility: [phone]    Type of Anesthesia Anticipated: General    Assessment & Plan     The proposed surgical procedure is considered LOW risk.      (Z01.818) Preop examination  Comment: For right hip arthroplasty as above.  Plan: Basic metabolic panel, CBC with platelets, EKG         12-lead, tracing only, Asymptomatic COVID-19         Virus (Coronavirus) by PCR             (I10) Benign essential hypertension  Comment: Well-controlled  Plan:      (Z23) Need for vaccination  Comment:    Plan: COVID-19,PF,MODERNA BIVALENT 18+Yrs, INFLUENZA,        QUAD, HD, PF, 65+ (FLUZONE HD)             PLAN:  1.  Twelve-lead EKG reviewed, normal sinus rhythm with underlying right bundle branch block, unchanged from previous  2.  CBC and basic metabolic profile reviewed  3.  High-dose influenza and Moderna booster  4.  Patient is otherwise cleared for surgery, and may take all his usual medications up until in the day of surgery.           Medication Instructions:      RECOMMENDATION:  APPROVAL GIVEN to proceed with proposed procedure, without further diagnostic evaluation.                        Subjective     HPI related to upcoming procedure: Patient comes in for preoperative clearance prior to right total hip arthroplasty.  Back in April or so the patient noticed some increasing right hip pain which is only  progressed, he has end-stage osteoarthritis of the schedule for replacement as above.    Patient has a history of underlying hypertension well controlled, hyperlipidemia also well controlled and he is on Cardura for underlying BPH.    Patient has not had any issues with anesthesia or pain control in the past, no other recent change in his health status.      Preop Questions 10/28/2022   1. Have you ever had a heart attack or stroke? No   2. Have you ever had surgery on your heart or blood vessels, such as a stent placement, a coronary artery bypass, or surgery on an artery in your head, neck, heart, or legs? No   3. Do you have chest pain with activity? No   4. Do you have a history of  heart failure? No   5. Do you currently have a cold, bronchitis or symptoms of other infection? No   6. Do you have a cough, shortness of breath, or wheezing? No   7. Do you or anyone in your family have previous history of blood clots? No   8. Do you or does anyone in your family have a serious bleeding problem such as prolonged bleeding following surgeries or cuts? No   9. Have you ever had problems with anemia or been told to take iron pills? No   10. Have you had any abnormal blood loss such as black, tarry or bloody stools? No   11. Have you ever had a blood transfusion? No   12. Are you willing to have a blood transfusion if it is medically needed before, during, or after your surgery? Yes   13. Have you or any of your relatives ever had problems with anesthesia? No   14. Do you have sleep apnea, excessive snoring or daytime drowsiness? No   14a. Do you have a CPAP machine? No   15. Do you have any artifical heart valves or other implanted medical devices like a pacemaker, defibrillator, or continuous glucose monitor? No   16. Do you have artificial joints? No   17. Are you allergic to latex? No       Health Care Directive:  Patient does not have a Health Care Directive or Living Will: Advance Directive received and scanned.  Click on Code in the patient header to view.    Preoperative Review of :   reviewed - no record of controlled substances prescribed.       Status of Chronic Conditions:  See problem list for active medical problems.  Problems all longstanding and stable, except as noted/documented.  See ROS for pertinent symptoms related to these conditions.      Review of Systems  CONSTITUTIONAL: NEGATIVE for fever, chills, change in weight  INTEGUMENTARY/SKIN: NEGATIVE for worrisome rashes, moles or lesions  EYES: NEGATIVE for vision changes or irritation  ENT/MOUTH: NEGATIVE for ear, mouth and throat problems  RESP: NEGATIVE for significant cough or SOB  CV: NEGATIVE for chest pain, palpitations or peripheral edema  GI: NEGATIVE for nausea, abdominal pain, heartburn, or change in bowel habits  : NEGATIVE for frequency, dysuria, or hematuria  MUSCULOSKELETAL: NEGATIVE for significant arthralgias or myalgia  NEURO: NEGATIVE for weakness, dizziness or paresthesias  ENDOCRINE: NEGATIVE for temperature intolerance, skin/hair changes  HEME: NEGATIVE for bleeding problems  PSYCHIATRIC: NEGATIVE for changes in mood or affect    Patient Active Problem List    Diagnosis Date Noted     HTN (hypertension) 10/28/2022     Priority: Medium     Obesity 09/18/2020     Priority: Medium     Formatting of this note might be different from the original.  Created by Conversion       Hyperlipidemia      Priority: Medium     Created by Conversion      Formatting of this note might be different from the original.  Created by Conversion       Post-nasal drainage 10/06/2015     Priority: Medium     Onychomycosis      Priority: Medium     Created by Conversion      Formatting of this note might be different from the original.  Created by Conversion       Obesity      Priority: Medium     Created by Conversion         Benign essential hypertension      Priority: Medium     Created by Conversion      Formatting of this note might be different from the  original.  Created by Conversion       Urinary Frequency More Than Twice At Night (Nocturia)      Priority: Medium     Created by Conversion      Formatting of this note might be different from the original.  Created by Conversion       Sebaceous cyst      Priority: Medium     Created by Conversion  [facility] Annotation: Apr 28 2014  9:18AM - Clay Rogersma: Inflamed   slightly.      Formatting of this note might be different from the original.  Created by Conversion  [facility] Annotation: Apr 28 2014  9:18AM - Clay Rogersma: Inflamed   slightly.       Elevated PSA 03/02/2015     Priority: Medium     Kidney cysts 02/23/2015     Priority: Medium     Routine general medical examination at a health care facility 02/23/2015     Priority: Medium      No past medical history on file.  Past Surgical History:   Procedure Laterality Date     [facility] APPENDECTOMY      Description: Appendectomy;  Recorded: 02/05/2013;     Current Outpatient Medications   Medication Sig Dispense Refill     atorvastatin (LIPITOR) 40 MG tablet TAKE 1 TABLET(40 MG) BY MOUTH AT BEDTIME 90 tablet 1     betamethasone valerate (VALISONE) 0.1 % cream [BETAMETHASONE VALERATE (VALISONE) 0.1 % CREAM] Apply topically 2 (two) times a day. 45 g 1     Cholecalciferol (VITAMIN D3 PO) Take by mouth daily       doxazosin (CARDURA) 4 MG tablet TAKE 1 TABLET(4 MG) BY MOUTH AT BEDTIME 90 tablet 2     metoprolol succinate ER (TOPROL-XL) 50 MG 24 hr tablet Take 1 tablet (50 mg) by mouth daily 90 tablet 3       No Known Allergies     Social History     Tobacco Use     Smoking status: Former     Smokeless tobacco: Never   Substance Use Topics     Alcohol use: Yes     Alcohol/week: 6.0 standard drinks     Family History   Problem Relation Age of Onset     No Known Problems Mother      Prostate Cancer Father 57     Heart Disease Father 57     Diabetes Father      History   Drug Use No         Objective     /82   Pulse 78   Temp 98.1  F (36.7  C) (Oral)    "Resp 17   Ht 1.727 m (5' 8\")   Wt 117 kg (258 lb)   SpO2 98%   BMI 39.23 kg/m      Physical Exam    GENERAL APPEARANCE: healthy, alert and no distress     EYES: EOMI,  PERRL     HENT: ear canals and TM's normal and nose and mouth without ulcers or lesions     NECK: no adenopathy, no asymmetry, masses, or scars and thyroid normal to palpation     RESP: lungs clear to auscultation - no rales, rhonchi or wheezes     CV: regular rates and rhythm, normal S1 S2, no S3 or S4 and no murmur, click or rub     ABDOMEN:  soft, nontender, no HSM or masses and bowel sounds normal     MS: extremities normal- no gross deformities noted, no evidence of inflammation in joints, FROM in all extremities.     SKIN: no suspicious lesions or rashes     NEURO: Normal strength and tone, sensory exam grossly normal, mentation intact and speech normal     PSYCH: mentation appears normal. and affect normal/bright     LYMPHATICS: No cervical adenopathy    Recent Labs   Lab Test 10/05/21  0938      POTASSIUM 4.5   CR 0.86        Diagnostics:  Recent Results (from the past 24 hour(s))   EKG 12-lead, tracing only    Collection Time: 10/28/22 10:59 AM   Result Value Ref Range    Systolic Blood Pressure  mmHg    Diastolic Blood Pressure  mmHg    Ventricular Rate 75 BPM    Atrial Rate 75 BPM    SD Interval 176 ms    QRS Duration 140 ms     ms    QTc 475 ms    P Axis 29 degrees    R AXIS -33 degrees    T Axis 5 degrees    Interpretation ECG       Sinus rhythm  Left axis deviation  Right bundle branch block  Abnormal ECG  When compared with ECG of 02-OCT-2019 11:09,  No significant change was found  Confirmed by ANASTASIYA PAGAN MD LOC:KEIRY ([id number]) on 10/28/2022 2:17:56 PM     Basic metabolic panel    Collection Time: 10/28/22 11:12 AM   Result Value Ref Range    Sodium 142 136 - 145 mmol/L    Potassium 4.5 3.4 - 5.3 mmol/L    Chloride 106 98 - 107 mmol/L    Carbon Dioxide (CO2) 25 22 - 29 mmol/L    Anion Gap 11 7 - 15 mmol/L    Urea " Nitrogen 17.6 8.0 - 23.0 mg/dL    Creatinine 1.01 0.67 - 1.17 mg/dL    Calcium 9.5 8.8 - 10.2 mg/dL    Glucose 106 (H) 70 - 99 mg/dL    GFR Estimate 79 >60 mL/min/1.73m2   CBC with platelets    Collection Time: 10/28/22 11:12 AM   Result Value Ref Range    WBC Count 6.1 4.0 - 11.0 10e3/uL    RBC Count 4.68 4.40 - 5.90 10e6/uL    Hemoglobin 14.6 13.3 - 17.7 g/dL    Hematocrit 43.9 40.0 - 53.0 %    MCV 94 78 - 100 fL    MCH 31.2 26.5 - 33.0 pg    MCHC 33.3 31.5 - 36.5 g/dL    RDW 12.9 10.0 - 15.0 %    Platelet Count 206 150 - 450 10e3/uL      EKG: appears normal, NSR, normal axis, normal intervals, no acute ST/T changes c/w ischemia, no LVH by voltage criteria, Right Bundle Branch Block, unchanged from previous tracings    Revised Cardiac Risk Index (RCRI):  The patient has the following serious cardiovascular risks for perioperative complications:   - No serious cardiac risks = 0 points     RCRI Interpretation: 0 points: Class I (very low risk - 0.4% complication rate)           Signed Electronically by: Ilia Horner MD  Copy of this evaluation report is provided to requesting physician.

## 2022-10-28 NOTE — LETTER
October 28, 2022      Damien Yi  [address]  [facility] [address]        Dear ,    We are writing to inform you of your test results.  Twelve-lead EKG is essentially normal and unchanged from previous.      Resulted Orders   EKG 12-lead, tracing only   Result Value Ref Range    Systolic Blood Pressure  mmHg    Diastolic Blood Pressure  mmHg    Ventricular Rate 75 BPM    Atrial Rate 75 BPM    WV Interval 176 ms    QRS Duration 140 ms     ms    QTc 475 ms    P Axis 29 degrees    R AXIS -33 degrees    T Axis 5 degrees    Interpretation ECG       Sinus rhythm  Left axis deviation  Right bundle branch block  Abnormal ECG  When compared with ECG of 02-OCT-2019 11:09,  No significant change was found  Confirmed by EJ SKAGGS, ANASTASIYA LOC:[id number]) on 10/28/2022 2:17:56 PM         If you have any questions or concerns, please call the clinic at the number listed above.       Sincerely,      Ilia Horner MD

## 2022-10-28 NOTE — LETTER
October 28, 2022      Damien Yi  [address]  [facility] [address]        Dear ,    We are writing to inform you of your test results.  Kidney tests are normal, blood counts are normal, no anemia.      Resulted Orders   Basic metabolic panel   Result Value Ref Range    Sodium 142 136 - 145 mmol/L    Potassium 4.5 3.4 - 5.3 mmol/L    Chloride 106 98 - 107 mmol/L    Carbon Dioxide (CO2) 25 22 - 29 mmol/L    Anion Gap 11 7 - 15 mmol/L    Urea Nitrogen 17.6 8.0 - 23.0 mg/dL    Creatinine 1.01 0.67 - 1.17 mg/dL    Calcium 9.5 8.8 - 10.2 mg/dL    Glucose 106 (H) 70 - 99 mg/dL    GFR Estimate 79 >60 mL/min/1.73m2      Comment:      Effective December 21, 2021 eGFRcr in adults is calculated using the 2021 CKD-EPI creatinine equation which includes age and gender (Lennie et al., NE, DOI: 10.1056/OMTRmq4751126)   CBC with platelets   Result Value Ref Range    WBC Count 6.1 4.0 - 11.0 10e3/uL    RBC Count 4.68 4.40 - 5.90 10e6/uL    Hemoglobin 14.6 13.3 - 17.7 g/dL    Hematocrit 43.9 40.0 - 53.0 %    MCV 94 78 - 100 fL    MCH 31.2 26.5 - 33.0 pg    MCHC 33.3 31.5 - 36.5 g/dL    RDW 12.9 10.0 - 15.0 %    Platelet Count 206 150 - 450 10e3/uL   EKG 12-lead, tracing only   Result Value Ref Range    Systolic Blood Pressure  mmHg    Diastolic Blood Pressure  mmHg    Ventricular Rate 75 BPM    Atrial Rate 75 BPM    TN Interval 176 ms    QRS Duration 140 ms     ms    QTc 475 ms    P Axis 29 degrees    R AXIS -33 degrees    T Axis 5 degrees    Interpretation ECG       Sinus rhythm  Left axis deviation  Right bundle branch block  Abnormal ECG  When compared with ECG of 02-OCT-2019 11:09,  No significant change was found  Confirmed by ANASTASIYA PAGAN MD LOC:JN ([id number]) on 10/28/2022 2:17:56 PM         If you have any questions or concerns, please call the clinic at the number listed above.       Sincerely,      Ilia Horner MD

## 2022-11-25 DIAGNOSIS — I10 ESSENTIAL HYPERTENSION, BENIGN: ICD-10-CM

## 2022-11-25 DIAGNOSIS — E78.5 HYPERLIPIDEMIA, UNSPECIFIED HYPERLIPIDEMIA TYPE: ICD-10-CM

## 2022-11-26 NOTE — TELEPHONE ENCOUNTER
"Routing refill request to provider for review/approval because:  Labs not current:  ldl  ?PCP    Last Written Prescription Date:  5/30/22  Last Fill Quantity: 90,  # refills: 1   Last office visit provider:  10/28/22 Dr Horner ?PCP    Requested Prescriptions   Pending Prescriptions Disp Refills     metoprolol succinate ER (TOPROL XL) 50 MG 24 hr tablet 90 tablet 3     Sig: Take 1 tablet (50 mg) by mouth daily       Beta-Blockers Protocol Passed - 11/25/2022  3:43 PM        Passed - Blood pressure under 140/90 in past 12 months     BP Readings from Last 3 Encounters:   10/28/22 128/82   08/22/22 122/78   07/21/22 (!) 146/87                 Passed - Patient is age 6 or older        Passed - Recent (12 mo) or future (30 days) visit within the authorizing provider's specialty     Patient has had an office visit with the authorizing provider or a provider within the authorizing providers department within the previous 12 mos or has a future within next 30 days. See \"Patient Info\" tab in inSpeakGlobalsket, or \"Choose Columns\" in Meds & Orders section of the refill encounter.              Passed - Medication is active on med list           atorvastatin (LIPITOR) 40 MG tablet 90 tablet 1     Sig: Take 1 tablet (40 mg) by mouth At Bedtime       Statins Protocol Failed - 11/25/2022  3:43 PM        Failed - LDL on file in past 12 months     Recent Labs   Lab Test 10/05/21  0938                Passed - No abnormal creatine kinase in past 12 months     No lab results found.             Passed - Recent (12 mo) or future (30 days) visit within the authorizing provider's specialty     Patient has had an office visit with the authorizing provider or a provider within the authorizing providers department within the previous 12 mos or has a future within next 30 days. See \"Patient Info\" tab in inGlobantet, or \"Choose Columns\" in Meds & Orders section of the refill encounter.              Passed - Medication is active on med list        " Passed - Patient is age 18 or older             Claudia Justice, RN 11/26/22 1:34 PM

## 2022-11-28 RX ORDER — ATORVASTATIN CALCIUM 40 MG/1
40 TABLET, FILM COATED ORAL AT BEDTIME
Qty: 90 TABLET | Refills: 0 | Status: SHIPPED | OUTPATIENT
Start: 2022-11-28 | End: 2022-12-08

## 2022-11-28 RX ORDER — METOPROLOL SUCCINATE 50 MG/1
50 TABLET, EXTENDED RELEASE ORAL DAILY
Qty: 90 TABLET | Refills: 0 | Status: SHIPPED | OUTPATIENT
Start: 2022-11-28 | End: 2022-12-08

## 2022-11-29 ENCOUNTER — TRANSFERRED RECORDS (OUTPATIENT)
Dept: HEALTH INFORMATION MANAGEMENT | Facility: CLINIC | Age: 72
End: 2022-11-29

## 2022-12-08 ENCOUNTER — OFFICE VISIT (OUTPATIENT)
Dept: FAMILY MEDICINE | Facility: CLINIC | Age: 72
End: 2022-12-08
Payer: COMMERCIAL

## 2022-12-08 VITALS
HEART RATE: 72 BPM | DIASTOLIC BLOOD PRESSURE: 84 MMHG | RESPIRATION RATE: 22 BRPM | TEMPERATURE: 97.5 F | BODY MASS INDEX: 38.95 KG/M2 | SYSTOLIC BLOOD PRESSURE: 126 MMHG | OXYGEN SATURATION: 97 % | WEIGHT: 257 LBS | HEIGHT: 68 IN

## 2022-12-08 DIAGNOSIS — I10 ESSENTIAL HYPERTENSION, BENIGN: ICD-10-CM

## 2022-12-08 DIAGNOSIS — B35.1 TOENAIL FUNGUS: ICD-10-CM

## 2022-12-08 DIAGNOSIS — Z11.59 NEED FOR HEPATITIS C SCREENING TEST: ICD-10-CM

## 2022-12-08 DIAGNOSIS — L30.9 ECZEMA, UNSPECIFIED TYPE: ICD-10-CM

## 2022-12-08 DIAGNOSIS — R31.9 HEMATURIA, UNSPECIFIED TYPE: ICD-10-CM

## 2022-12-08 DIAGNOSIS — Z12.11 SCREENING FOR COLON CANCER: ICD-10-CM

## 2022-12-08 DIAGNOSIS — E66.01 MORBID OBESITY (H): ICD-10-CM

## 2022-12-08 DIAGNOSIS — R35.1 NOCTURIA: ICD-10-CM

## 2022-12-08 DIAGNOSIS — Z00.00 ENCOUNTER FOR MEDICARE ANNUAL WELLNESS EXAM: Primary | ICD-10-CM

## 2022-12-08 DIAGNOSIS — Z12.5 SCREENING FOR PROSTATE CANCER: ICD-10-CM

## 2022-12-08 DIAGNOSIS — Z13.6 SCREENING FOR AAA (ABDOMINAL AORTIC ANEURYSM): ICD-10-CM

## 2022-12-08 DIAGNOSIS — E78.5 HYPERLIPIDEMIA, UNSPECIFIED HYPERLIPIDEMIA TYPE: ICD-10-CM

## 2022-12-08 LAB
ALBUMIN SERPL BCG-MCNC: 4 G/DL (ref 3.5–5.2)
ALBUMIN UR-MCNC: NEGATIVE MG/DL
ALP SERPL-CCNC: 91 U/L (ref 40–129)
ALT SERPL W P-5'-P-CCNC: 19 U/L (ref 10–50)
ANION GAP SERPL CALCULATED.3IONS-SCNC: 10 MMOL/L (ref 7–15)
APPEARANCE UR: ABNORMAL
AST SERPL W P-5'-P-CCNC: 22 U/L (ref 10–50)
BILIRUB SERPL-MCNC: 0.5 MG/DL
BILIRUB UR QL STRIP: NEGATIVE
BUN SERPL-MCNC: 22.1 MG/DL (ref 8–23)
CALCIUM SERPL-MCNC: 8.7 MG/DL (ref 8.8–10.2)
CHLORIDE SERPL-SCNC: 109 MMOL/L (ref 98–107)
COLOR UR AUTO: YELLOW
CREAT SERPL-MCNC: 0.9 MG/DL (ref 0.67–1.17)
DEPRECATED HCO3 PLAS-SCNC: 24 MMOL/L (ref 22–29)
GFR SERPL CREATININE-BSD FRML MDRD: 90 ML/MIN/1.73M2
GLUCOSE SERPL-MCNC: 96 MG/DL (ref 70–99)
GLUCOSE UR STRIP-MCNC: NEGATIVE MG/DL
HCV AB SERPL QL IA: NONREACTIVE
HGB UR QL STRIP: NEGATIVE
KETONES UR STRIP-MCNC: NEGATIVE MG/DL
LEUKOCYTE ESTERASE UR QL STRIP: ABNORMAL
NITRATE UR QL: NEGATIVE
PH UR STRIP: 5.5 [PH] (ref 5–8)
POTASSIUM SERPL-SCNC: 4.1 MMOL/L (ref 3.4–5.3)
PROT SERPL-MCNC: 6.7 G/DL (ref 6.4–8.3)
PSA SERPL-MCNC: 5.32 NG/ML (ref 0–6.5)
RBC #/AREA URNS AUTO: ABNORMAL /HPF
SODIUM SERPL-SCNC: 143 MMOL/L (ref 136–145)
SP GR UR STRIP: 1.02 (ref 1–1.03)
SQUAMOUS #/AREA URNS AUTO: ABNORMAL /LPF
UROBILINOGEN UR STRIP-ACNC: 0.2 E.U./DL
WBC #/AREA URNS AUTO: ABNORMAL /HPF

## 2022-12-08 PROCEDURE — 99214 OFFICE O/P EST MOD 30 MIN: CPT | Mod: 25 | Performed by: INTERNAL MEDICINE

## 2022-12-08 PROCEDURE — 80053 COMPREHEN METABOLIC PANEL: CPT | Performed by: INTERNAL MEDICINE

## 2022-12-08 PROCEDURE — G0439 PPPS, SUBSEQ VISIT: HCPCS | Performed by: INTERNAL MEDICINE

## 2022-12-08 PROCEDURE — 86803 HEPATITIS C AB TEST: CPT | Performed by: INTERNAL MEDICINE

## 2022-12-08 PROCEDURE — 87086 URINE CULTURE/COLONY COUNT: CPT | Performed by: INTERNAL MEDICINE

## 2022-12-08 PROCEDURE — 36415 COLL VENOUS BLD VENIPUNCTURE: CPT | Performed by: INTERNAL MEDICINE

## 2022-12-08 PROCEDURE — G0103 PSA SCREENING: HCPCS | Performed by: INTERNAL MEDICINE

## 2022-12-08 PROCEDURE — 81001 URINALYSIS AUTO W/SCOPE: CPT | Performed by: INTERNAL MEDICINE

## 2022-12-08 RX ORDER — BETAMETHASONE VALERATE 1.2 MG/G
CREAM TOPICAL 2 TIMES DAILY
Qty: 45 G | Refills: 1 | Status: SHIPPED | OUTPATIENT
Start: 2022-12-08 | End: 2023-05-16

## 2022-12-08 RX ORDER — ATORVASTATIN CALCIUM 40 MG/1
40 TABLET, FILM COATED ORAL AT BEDTIME
Qty: 90 TABLET | Refills: 3 | Status: SHIPPED | OUTPATIENT
Start: 2022-12-08 | End: 2023-12-25

## 2022-12-08 RX ORDER — DOXAZOSIN 4 MG/1
TABLET ORAL
Qty: 90 TABLET | Refills: 3 | Status: SHIPPED | OUTPATIENT
Start: 2022-12-08 | End: 2024-01-29

## 2022-12-08 RX ORDER — METOPROLOL SUCCINATE 50 MG/1
50 TABLET, EXTENDED RELEASE ORAL DAILY
Qty: 90 TABLET | Refills: 3 | Status: SHIPPED | OUTPATIENT
Start: 2022-12-08 | End: 2024-01-29

## 2022-12-08 ASSESSMENT — ENCOUNTER SYMPTOMS
ARTHRALGIAS: 1
SHORTNESS OF BREATH: 0
ABDOMINAL PAIN: 0
NAUSEA: 0
HEADACHES: 0
HEMATOCHEZIA: 0
SORE THROAT: 0
EYE PAIN: 0
NERVOUS/ANXIOUS: 0
CHILLS: 0
PALPITATIONS: 0
FEVER: 0
FREQUENCY: 1
HEARTBURN: 0
DYSURIA: 0
DIARRHEA: 0
DIZZINESS: 0
WEAKNESS: 0
CONSTIPATION: 0
MYALGIAS: 0
JOINT SWELLING: 1
PARESTHESIAS: 0
COUGH: 0
HEMATURIA: 1

## 2022-12-08 ASSESSMENT — ACTIVITIES OF DAILY LIVING (ADL): CURRENT_FUNCTION: NO ASSISTANCE NEEDED

## 2022-12-08 NOTE — PROGRESS NOTES
"SUBJECTIVE:   Damien is a 72 year old who presents for Preventive Visit.  Patient has been advised of split billing requirements and indicates understanding: Yes  Are you in the first 12 months of your Medicare coverage?  No    Healthy Habits:     In general, how would you rate your overall health?  Good    Frequency of exercise:  2-3 days/week    Duration of exercise:  Less than 15 minutes    Do you usually eat at least 4 servings of fruit and vegetables a day, include whole grains    & fiber and avoid regularly eating high fat or \"junk\" foods?  No    Taking medications regularly:  Yes    Medication side effects:  None    Ability to successfully perform activities of daily living:  No assistance needed    Home Safety:  No safety concerns identified    Hearing Impairment:  Difficulty following a conversation in a noisy restaurant or crowded room, difficult to understand a speaker at a public meeting or Mosque service and need to ask people to speak up or repeat themselves    In the past 6 months, have you been bothered by leaking of urine? Yes    In general, how would you rate your overall mental or emotional health?  Good      PHQ-2 Total Score: 0    Additional concerns today:  No    BPH- going well on alpha blocker therapy. Did have some hematuria noted on UA in the past- noted another time again. No dysuria.     Hyperlipidemia- on statin therapy, no myalgias.    Toenail fungus- has tried topical therapy, tried terbinafine in the past without help and notes issues with not taking with atorvastatin.    Hypertension- has been on metoprolol. No chest pain, no shortness of breath. No fatigue in the AM, no known sleep apnea    Have you ever done Advance Care Planning? (For example, a Health Directive, POLST, or a discussion with a medical provider or your loved ones about your wishes): Yes, advance care planning is on file.    Fall risk  Fallen 2 or more times in the past year?: No  Any fall with injury in the past " year?: No  click delete button to remove this line now  Cognitive Screening   1) Repeat 3 items (Leader, Season, Table)    2) Clock draw: NORMAL  3) 3 item recall: Recalls 3 objects  Results: 3 items recalled: COGNITIVE IMPAIRMENT LESS LIKELY    Mini-CogTM Copyright ASHLEY Hall. Licensed by the author for use in [facility]; reprinted with permission (molly@[facility]). All rights reserved.      Do you have sleep apnea, excessive snoring or daytime drowsiness?: no    Reviewed and updated as needed this visit by clinical staff   Tobacco  Allergies  Meds              Reviewed and updated as needed this visit by Provider                 Social History     Tobacco Use     Smoking status: Former     Smokeless tobacco: Never     Tobacco comments:     In college   Substance Use Topics     Alcohol use: Yes     Alcohol/week: 10.0 standard drinks     Types: 10 Standard drinks or equivalent per week     If you drink alcohol do you typically have >3 drinks per day or >7 drinks per week? No    Alcohol Use 12/8/2022   Prescreen: >3 drinks/day or >7 drinks/week? No   Prescreen: >3 drinks/day or >7 drinks/week? -   AUDIT SCORE  -       Current providers sharing in care for this patient include:     Patient Care Team:  Leonardo Perez MD as PCP - General (Internal Medicine - Pediatrics)  Alejandro Baumann DO as Assigned Musculoskeletal Provider  Ilia Horner MD as Assigned PCP    The following health maintenance items are reviewed in Epic and correct as of today:  Health Maintenance   Topic Date Due     ANNUAL REVIEW OF  ORDERS  Never done     HEPATITIS C SCREENING  Never done     LUNG CANCER SCREENING  Never done     AORTIC ANEURYSM SCREENING (SYSTEM ASSIGNED)  Never done     ZOSTER IMMUNIZATION (2 of 3) 04/14/2014     MEDICARE ANNUAL WELLNESS VISIT  10/05/2022     FALL RISK ASSESSMENT  12/08/2023     LIPID  10/05/2026     ADVANCE CARE PLANNING  10/05/2026     DTAP/TDAP/TD IMMUNIZATION (4 - Td or Tdap)  "03/25/2029     COLORECTAL CANCER SCREENING  04/25/2029     PHQ-2 (once per calendar year)  Completed     INFLUENZA VACCINE  Completed     Pneumococcal Vaccine: 65+ Years  Completed     COVID-19 Vaccine  Completed     IPV IMMUNIZATION  Aged Out     MENINGITIS IMMUNIZATION  Aged Out         Review of Systems   Constitutional: Negative for chills and fever.   HENT: Positive for hearing loss. Negative for congestion, ear pain and sore throat.    Eyes: Negative for pain and visual disturbance.   Respiratory: Negative for cough and shortness of breath.    Cardiovascular: Negative for chest pain, palpitations and peripheral edema.   Gastrointestinal: Negative for abdominal pain, constipation, diarrhea, heartburn, hematochezia and nausea.   Genitourinary: Positive for frequency, hematuria and urgency. Negative for dysuria, genital sores, impotence and penile discharge.   Musculoskeletal: Positive for arthralgias and joint swelling. Negative for myalgias.   Skin: Negative for rash.   Neurological: Negative for dizziness, weakness, headaches and paresthesias.   Psychiatric/Behavioral: Negative for mood changes. The patient is not nervous/anxious.      OBJECTIVE:   /84 (BP Location: Right arm, Patient Position: Sitting, Cuff Size: Adult Large)   Pulse 72   Temp 97.5  F (36.4  C) (Oral)   Resp 22   Ht 1.727 m (5' 8\")   Wt 116.6 kg (257 lb)   SpO2 97%   BMI 39.08 kg/m   Estimated body mass index is 39.08 kg/m  as calculated from the following:    Height as of this encounter: 1.727 m (5' 8\").    Weight as of this encounter: 116.6 kg (257 lb).   Wt Readings from Last 4 Encounters:   12/08/22 116.6 kg (257 lb)   10/28/22 117 kg (258 lb)   08/22/22 118.6 kg (261 lb 6.4 oz)   07/21/22 118.8 kg (262 lb)       Physical Exam  GENERAL: healthy, alert and no distress  EYES: Eyes grossly normal to inspection, PERRL and conjunctivae and sclerae normal  HENT: ear canals and TM's normal, nose and mouth without ulcers or " lesions  NECK: no adenopathy, no asymmetry, masses, or scars and thyroid normal to palpation  RESP: lungs clear to auscultation - no rales, rhonchi or wheezes  CV: regular rate and rhythm, normal S1 S2, no S3 or S4, no murmur, click or rub, no peripheral edema and peripheral pulses strong  ABDOMEN: soft, nontender, no hepatosplenomegaly, no masses and bowel sounds normal  MS: no gross musculoskeletal defects noted, no edema  SKIN: no suspicious lesions or rashes  NEURO: Normal strength and tone, mentation intact and speech normal  PSYCH: mentation appears normal, affect normal/bright    Diagnostic Test Results:  Labs reviewed in Epic    ASSESSMENT / PLAN:       ICD-10-CM    1. Encounter for Medicare annual wellness exam  Z00.00       2. Need for hepatitis C screening test  Z11.59 Hepatitis C Screen Reflex to HCV RNA Quant and Genotype     Hepatitis C Screen Reflex to HCV RNA Quant and Genotype      3. Eczema, unspecified type - occasional on face, responds well too topical steroids. L30.9 betamethasone valerate (VALISONE) 0.1 % external cream      4. Essential hypertension, benign - controlled with metoprolol, consider ACE therapy if needed and if worsening may consider sleep study evaluation I10 metoprolol succinate ER (TOPROL XL) 50 MG 24 hr tablet      5. Hyperlipidemia, unspecified hyperlipidemia type - repeat labs today, continue current therapy E78.5 atorvastatin (LIPITOR) 40 MG tablet     Comprehensive metabolic panel     Comprehensive metabolic panel      6. Nocturia - stable on current therapy, PSA has been normal R35.1 doxazosin (CARDURA) 4 MG tablet      7. Screening for AAA (abdominal aortic aneurysm)  Z13.6 US Abdominal Aorta Imaging      8. Screening for prostate cancer  Z12.5 PSA, screen     PSA, screen      9. Elevated BMI obesity (H) contributing to hypertension causing morbid status, discussed diet and exercise, had hip replaced contributed to decreased activity E66.01       10. Hematuria,  "unspecified type - has been noted in the past, repeat today R31.9 UA Macro with Reflex to Micro and Culture - lab collect     UA Macro with Reflex to Micro and Culture - lab collect     Urine Microscopic Exam     Urine Culture      11. Screening for colon cancer  Z12.11 Colonoscopy Screening  Referral      12. Toenail fungus - refer to dermatology as failed terbinafine and topical therapy. B35.1 Adult Dermatology Referral            COUNSELING:  Reviewed preventive health counseling, as reflected in patient instructions      BMI:   Estimated body mass index is 39.08 kg/m  as calculated from the following:    Height as of this encounter: 1.727 m (5' 8\").    Weight as of this encounter: 116.6 kg (257 lb).   Weight management plan: Discussed healthy diet and exercise guidelines      He reports that he has quit smoking. He has never used smokeless tobacco.      Appropriate preventive services were discussed with this patient, including applicable screening as appropriate for cardiovascular disease, diabetes, osteopenia/osteoporosis, and glaucoma.  As appropriate for age/gender, discussed screening for colorectal cancer, prostate cancer, breast cancer, and cervical cancer. Checklist reviewing preventive services available has been given to the patient.    Reviewed patients plan of care and provided an AVS. The Basic Care Plan (routine screening as documented in Health Maintenance) for Sharad meets the Care Plan requirement. This Care Plan has been established and reviewed with the Patient.      Leonardo Perez MD  [facility]    Identified Health Risks:  "

## 2022-12-08 NOTE — PATIENT INSTRUCTIONS
Check at your pharmacy for your shingles vaccine. You can call the number on the back of insurance card to see if covered in clinic.     Routine labs as we discussed today.     Checking urine looking for blood again.     Refilled medication today.     They should call to set up the ultrasound of the aorta. If not Please call [facility] [phone] to arrange your study.      Let me know if issues come up.    Leonardo Perez MD        Understanding Latinda MyPlate  The USDA has guidelines to help you make healthy food choices. These are called MyPlate. MyPlate shows the food groups that make up healthy meals using the image of a place setting. Before you eat, think about the healthiest choices for what to put on your plate or in your cup or bowl. To learn more about building a healthy plate, visit www.choose[employer]icplate.gov.    The food groups  Fruits. Any fruit or 100% fruit juice counts as part of the Fruit Group. Fruits may be fresh, canned, frozen, or dried, and may be whole, cut-up, or pureed. Make 1/2 of your plate fruits and vegetables.  Vegetables. Any vegetable or 100% vegetable juice counts as a member of the Vegetable Group. Vegetables may be fresh, frozen, canned, or dried. They can be served raw or cooked and may be whole, cut-up, or mashed. Make 1/2 of your plate fruits and vegetables.  Grains. All foods made from grains are part of the Grains Group. These include wheat, rice, oats, cornmeal, and barley. Grains are often used to make foods such as bread, pasta, oatmeal, cereal, tortillas, and grits. Grains should be no more than 1/4 of your plate. At least half of your grains should be whole grains.  Protein. This group includes meat, poultry, seafood, beans and peas, eggs, processed soy products (such as tofu), nuts (including nut butters), and seeds. Make protein choices no more than 1/4 of your plate. Meat and poultry choices should be lean or low fat.  Dairy. The Dairy Group includes all fluid  milk products and foods made from milk that contain calcium, such as yogurt and cheese. (Foods that have little calcium, such as cream, butter, and cream cheese, are not part of this group.) Most dairy choices should be low-fat or fat-free.  Oils. Oils aren't a food group, but they do contain essential nutrients. However it's important to watch your intake of oils. These are fats that are liquid at room temperature. They include canola, corn, olive, soybean, vegetable, and sunflower oil. Foods that are mainly oil include mayonnaise, certain salad dressings, and soft margarines. You likely already get your daily oil allowance from the foods you eat.  Things to limit  Eating healthy also means limiting these things in your diet:     Salt (sodium). Many processed foods have a lot of sodium. To keep sodium intake down, eat fresh vegetables, meats, poultry, and seafood when possible. Purchase low-sodium, reduced-sodium, or no-salt-added food products at the store. And don't add salt to your meals at home. Instead, season them with herbs and spices such as dill, oregano, cumin, and paprika. Or try adding flavor with lemon or lime zest and juice.  Saturated fat. Saturated fats are most often found in animal products such as beef, pork, and chicken. They are often solid at room temperature, such as butter. To reduce your saturated fat intake, choose leaner cuts of meat and poultry. And try healthier cooking methods such as grilling, broiling, roasting, or baking. For a simple lower-fat swap, use plain nonfat yogurt instead of mayonnaise when making potato salad or macaroni salad.  Added sugars. These are sugars added to foods. They are in foods such as ice cream, candy, soda, fruit drinks, sports drinks, energy drinks, cookies, pastries, jams, and syrups. Cut down on added sugars by sharing sweet treats with a family member or friend. You can also choose fruit for dessert, and drink water or other unsweetened beverages.      [employer] last reviewed this educational content on 6/1/2020 2000-2021 [employer]. All rights reserved. This information is not intended as a substitute for professional medical care. Always follow your healthcare professional's instructions.          Signs of Hearing Loss      Hearing much better with one ear can be a sign of hearing loss.   Hearing loss is a problem shared by many people. In fact, it is one of the most common health problems, particularly as people age. Most people age 65 and older have some hearing loss. By age 80, almost everyone does. Hearing loss often occurs slowly over the years. So you may not realize your hearing has gotten worse.  Have your hearing checked  Call your healthcare provider if you:  Have to strain to hear normal conversation  Have to watch other people s faces very carefully to follow what they re saying  Need to ask people to repeat what they ve said  Often misunderstand what people are saying  Turn the volume of the television or radio up so high that others complain  Feel that people are mumbling when they re talking to you  Find that the effort to hear leaves you feeling tired and irritated  Notice, when using the phone, that you hear better with one ear than the other  [employer] last reviewed this educational content on 1/1/2020 2000-2021 [employer]. All rights reserved. This information is not intended as a substitute for professional medical care. Always follow your healthcare professional's instructions.          Urinary Incontinence (Male)    Urinary incontinence means not being able to control the release of urine from the bladder.   Causes  Common causes of urinary incontinence in men include:  Infection  Certain medicines  Aging  Poor pelvic muscle tone  Bladder spasms  Obesity  Trouble urinating and fully emptying the bladder (urinary retention)  Other things that can cause incontinence are:   Nervous system  diseases  Diabetes  Sleep apnea  Urinary tract infections  Prostate surgery  Pelvic injury  Constipation and smoking have also been identified as risk factors.   Symptoms  Urge incontinence (overactive bladder). This is a sudden urge to urinate. It occurs even though there may not be much urine in the bladder. The need to urinate often during the night is common. It's due to bladder spasms.  Stress incontinence. This is urine leakage that you can't control. It can occur with sneezing, coughing, and other actions that put stress on the bladder.    Treatment  Treatment depends on what is causing the condition. Bladder infections are treated with antibiotics. Urinary retention is treated with a bladder catheter.   Home care  Follow these guidelines when caring for yourself at home:  Don't have any foods and drinks that may irritate the bladder. This includes:  Chocolate  Alcohol  Caffeine  Carbonated drinks  Acidic fruits and juices  Limit fluids to 6 to 8 cups a day.  Lose weight if you are overweight. This will reduce your symptoms.  If advised, do regular pelvic muscle-strengthening exercises such as Kegel exercises.  If needed, wear absorbent pads to catch urine. Change the pads often. This is for good hygiene and to prevent skin and bladder infections.  Bathe daily for good hygiene.  If an antibiotic was prescribed to treat a bladder infection, take it until it's finished. Keep taking it even if you are feeling better. This is to make sure your infection has cleared.  If a catheter was left in place, keep bacteria from getting into the collection bag. Don't disconnect the catheter from the collection bag.  Use a leg band to secure the catheter drainage tube, so it does not pull on the catheter. Drain the collection bag when it becomes full. To do this, use the drain spout at the bottom of the bag. Don't disconnect the bag from the catheter.  Don't pull on or try to remove a catheter. The catheter must be removed  by a healthcare provider.  If you smoke, stop. Ask your provider for help if you can't do this on your own.  Follow-up care  Follow up with your healthcare provider, or as advised.  When to get medical advice  Call your healthcare provider right away if any of these occur:  Fever over 100.4 F (38 C), or as directed by your provider  Bladder pain or fullness  Belly swelling, nausea, or vomiting  Back pain  Weakness, dizziness, or fainting  If a catheter was left in place, return if:  The catheter falls out  The catheter stops draining for 6 hours  Your urine gets cloudy or smells bad  Jj last reviewed this educational content on 1/1/2020 2000-2021 [employer]. All rights reserved. This information is not intended as a substitute for professional medical care. Always follow your healthcare professional's instructions.

## 2022-12-09 LAB — BACTERIA UR CULT: NO GROWTH

## 2022-12-14 ENCOUNTER — HOSPITAL ENCOUNTER (OUTPATIENT)
Dept: ULTRASOUND IMAGING | Facility: CLINIC | Age: 72
Discharge: HOME | End: 2022-12-14
Attending: INTERNAL MEDICINE | Admitting: INTERNAL MEDICINE
Payer: COMMERCIAL

## 2022-12-14 DIAGNOSIS — Z13.6 SCREENING FOR AAA (ABDOMINAL AORTIC ANEURYSM): ICD-10-CM

## 2022-12-14 PROCEDURE — 76775 US EXAM ABDO BACK WALL LIM: CPT

## 2022-12-29 ENCOUNTER — TRANSFERRED RECORDS (OUTPATIENT)
Dept: HEALTH INFORMATION MANAGEMENT | Facility: CLINIC | Age: 72
End: 2022-12-29

## 2023-01-16 ENCOUNTER — TRANSFERRED RECORDS (OUTPATIENT)
Dept: HEALTH INFORMATION MANAGEMENT | Facility: CLINIC | Age: 73
End: 2023-01-16

## 2023-02-22 ENCOUNTER — MYC MEDICAL ADVICE (OUTPATIENT)
Dept: PEDIATRICS | Facility: CLINIC | Age: 73
End: 2023-02-22
Payer: COMMERCIAL

## 2023-05-04 ENCOUNTER — TRANSFERRED RECORDS (OUTPATIENT)
Dept: HEALTH INFORMATION MANAGEMENT | Facility: CLINIC | Age: 73
End: 2023-05-04
Payer: COMMERCIAL

## 2023-05-16 DIAGNOSIS — L30.9 ECZEMA, UNSPECIFIED TYPE: ICD-10-CM

## 2023-05-16 RX ORDER — BETAMETHASONE VALERATE 1.2 MG/G
CREAM TOPICAL 2 TIMES DAILY
Qty: 45 G | Refills: 1 | Status: SHIPPED | OUTPATIENT
Start: 2023-05-16 | End: 2023-07-08

## 2023-05-16 NOTE — TELEPHONE ENCOUNTER
Routing refill request to provider for review/approval because:  Drug not on the [facility] refill protocol     Last Written Prescription Date:  12/8/22  Last Fill Quantity: 45,  # refills: 1   Last office visit provider:  12/8/22     Requested Prescriptions   Pending Prescriptions Disp Refills     betamethasone valerate (VALISONE) 0.1 % external cream 45 g 1     Sig: Apply topically 2 times daily       There is no refill protocol information for this order          Claudia Justice RN 05/16/23 4:02 PM

## 2023-07-08 ENCOUNTER — HOSPITAL ENCOUNTER (INPATIENT)
Facility: CLINIC | Age: 73
LOS: 2 days | Discharge: HOME | DRG: 175 | End: 2023-07-10
Attending: EMERGENCY MEDICINE | Admitting: HOSPITALIST
Payer: COMMERCIAL

## 2023-07-08 ENCOUNTER — APPOINTMENT (OUTPATIENT)
Dept: CT IMAGING | Facility: CLINIC | Age: 73
DRG: 175 | End: 2023-07-08
Attending: EMERGENCY MEDICINE
Payer: COMMERCIAL

## 2023-07-08 ENCOUNTER — APPOINTMENT (OUTPATIENT)
Dept: CARDIOLOGY | Facility: CLINIC | Age: 73
DRG: 175 | End: 2023-07-08
Attending: HOSPITALIST
Payer: COMMERCIAL

## 2023-07-08 ENCOUNTER — APPOINTMENT (OUTPATIENT)
Dept: ULTRASOUND IMAGING | Facility: CLINIC | Age: 73
DRG: 175 | End: 2023-07-08
Attending: EMERGENCY MEDICINE
Payer: COMMERCIAL

## 2023-07-08 DIAGNOSIS — I26.99 OTHER ACUTE PULMONARY EMBOLISM WITHOUT ACUTE COR PULMONALE (H): ICD-10-CM

## 2023-07-08 DIAGNOSIS — K80.20 GALLSTONES: ICD-10-CM

## 2023-07-08 DIAGNOSIS — J96.01 ACUTE RESPIRATORY FAILURE WITH HYPOXIA (H): ICD-10-CM

## 2023-07-08 DIAGNOSIS — I26.99 ACUTE PULMONARY EMBOLISM, UNSPECIFIED PULMONARY EMBOLISM TYPE, UNSPECIFIED WHETHER ACUTE COR PULMONALE PRESENT (H): Primary | ICD-10-CM

## 2023-07-08 LAB
ALBUMIN SERPL-MCNC: 3.6 G/DL (ref 3.5–5)
ALP SERPL-CCNC: 64 U/L (ref 45–120)
ALT SERPL W P-5'-P-CCNC: 23 U/L (ref 0–45)
ANION GAP SERPL CALCULATED.3IONS-SCNC: 10 MMOL/L (ref 5–18)
APTT PPP: <20 SECONDS (ref 22–38)
AST SERPL W P-5'-P-CCNC: 18 U/L (ref 0–40)
ATRIAL RATE - MUSE: 88 BPM
BASOPHILS # BLD AUTO: 0 10E3/UL (ref 0–0.2)
BASOPHILS NFR BLD AUTO: 0 %
BILIRUB SERPL-MCNC: 0.9 MG/DL (ref 0–1)
BNP SERPL-MCNC: 14 PG/ML (ref 0–74)
BUN SERPL-MCNC: 19 MG/DL (ref 8–28)
CALCIUM SERPL-MCNC: 9 MG/DL (ref 8.5–10.5)
CHLORIDE BLD-SCNC: 107 MMOL/L (ref 98–107)
CO2 SERPL-SCNC: 22 MMOL/L (ref 22–31)
CREAT SERPL-MCNC: 0.98 MG/DL (ref 0.7–1.3)
D DIMER PPP FEU-MCNC: 7.34 UG/ML FEU (ref 0–0.5)
DIASTOLIC BLOOD PRESSURE - MUSE: 99 MMHG
EOSINOPHIL # BLD AUTO: 0.2 10E3/UL (ref 0–0.7)
EOSINOPHIL NFR BLD AUTO: 3 %
ERYTHROCYTE [DISTWIDTH] IN BLOOD BY AUTOMATED COUNT: 13.7 % (ref 10–15)
FLUAV RNA SPEC QL NAA+PROBE: NEGATIVE
FLUBV RNA RESP QL NAA+PROBE: NEGATIVE
GFR SERPL CREATININE-BSD FRML MDRD: 81 ML/MIN/1.73M2
GLUCOSE BLD-MCNC: 116 MG/DL (ref 70–125)
HCT VFR BLD AUTO: 43.4 % (ref 40–53)
HGB BLD-MCNC: 14.6 G/DL (ref 13.3–17.7)
HOLD SPECIMEN: NORMAL
IMM GRANULOCYTES # BLD: 0 10E3/UL
IMM GRANULOCYTES NFR BLD: 0 %
INR PPP: 0.98 (ref 0.85–1.15)
INTERPRETATION ECG - MUSE: NORMAL
LVEF ECHO: NORMAL
LYMPHOCYTES # BLD AUTO: 1.8 10E3/UL (ref 0.8–5.3)
LYMPHOCYTES NFR BLD AUTO: 30 %
MCH RBC QN AUTO: 31.5 PG (ref 26.5–33)
MCHC RBC AUTO-ENTMCNC: 33.6 G/DL (ref 31.5–36.5)
MCV RBC AUTO: 94 FL (ref 78–100)
MONOCYTES # BLD AUTO: 0.6 10E3/UL (ref 0–1.3)
MONOCYTES NFR BLD AUTO: 9 %
NEUTROPHILS # BLD AUTO: 3.5 10E3/UL (ref 1.6–8.3)
NEUTROPHILS NFR BLD AUTO: 58 %
NRBC # BLD AUTO: 0 10E3/UL
NRBC BLD AUTO-RTO: 0 /100
P AXIS - MUSE: 54 DEGREES
PLATELET # BLD AUTO: 153 10E3/UL (ref 150–450)
POTASSIUM BLD-SCNC: 4.2 MMOL/L (ref 3.5–5)
PR INTERVAL - MUSE: 178 MS
PROT SERPL-MCNC: 6.9 G/DL (ref 6–8)
QRS DURATION - MUSE: 140 MS
QT - MUSE: 390 MS
QTC - MUSE: 471 MS
R AXIS - MUSE: -40 DEGREES
RADIOLOGIST FLAGS: ABNORMAL
RBC # BLD AUTO: 4.63 10E6/UL (ref 4.4–5.9)
RSV RNA SPEC NAA+PROBE: NEGATIVE
SARS-COV-2 RNA RESP QL NAA+PROBE: NEGATIVE
SODIUM SERPL-SCNC: 139 MMOL/L (ref 136–145)
SYSTOLIC BLOOD PRESSURE - MUSE: 160 MMHG
T AXIS - MUSE: 4 DEGREES
TROPONIN I SERPL-MCNC: 0.01 NG/ML (ref 0–0.29)
VENTRICULAR RATE- MUSE: 88 BPM
WBC # BLD AUTO: 6.1 10E3/UL (ref 4–11)

## 2023-07-08 PROCEDURE — 93306 TTE W/DOPPLER COMPLETE: CPT | Mod: 26 | Performed by: INTERNAL MEDICINE

## 2023-07-08 PROCEDURE — 250N000011 HC RX IP 250 OP 636: Mod: JZ | Performed by: EMERGENCY MEDICINE

## 2023-07-08 PROCEDURE — 250N000013 HC RX MED GY IP 250 OP 250 PS 637: Performed by: EMERGENCY MEDICINE

## 2023-07-08 PROCEDURE — 80053 COMPREHEN METABOLIC PANEL: CPT | Performed by: EMERGENCY MEDICINE

## 2023-07-08 PROCEDURE — 71275 CT ANGIOGRAPHY CHEST: CPT

## 2023-07-08 PROCEDURE — 85730 THROMBOPLASTIN TIME PARTIAL: CPT | Performed by: EMERGENCY MEDICINE

## 2023-07-08 PROCEDURE — 99285 EMERGENCY DEPT VISIT HI MDM: CPT | Mod: 25

## 2023-07-08 PROCEDURE — 36415 COLL VENOUS BLD VENIPUNCTURE: CPT | Performed by: EMERGENCY MEDICINE

## 2023-07-08 PROCEDURE — 87637 SARSCOV2&INF A&B&RSV AMP PRB: CPT | Performed by: EMERGENCY MEDICINE

## 2023-07-08 PROCEDURE — 85610 PROTHROMBIN TIME: CPT | Performed by: EMERGENCY MEDICINE

## 2023-07-08 PROCEDURE — 250N000011 HC RX IP 250 OP 636: Mod: JZ | Performed by: HOSPITALIST

## 2023-07-08 PROCEDURE — 99222 1ST HOSP IP/OBS MODERATE 55: CPT | Performed by: HOSPITALIST

## 2023-07-08 PROCEDURE — 93005 ELECTROCARDIOGRAM TRACING: CPT | Performed by: EMERGENCY MEDICINE

## 2023-07-08 PROCEDURE — 255N000002 HC RX 255 OP 636: Performed by: HOSPITALIST

## 2023-07-08 PROCEDURE — 120N000013 HC R&B IMCU

## 2023-07-08 PROCEDURE — 250N000013 HC RX MED GY IP 250 OP 250 PS 637: Performed by: HOSPITALIST

## 2023-07-08 PROCEDURE — 85379 FIBRIN DEGRADATION QUANT: CPT | Performed by: EMERGENCY MEDICINE

## 2023-07-08 PROCEDURE — 93970 EXTREMITY STUDY: CPT

## 2023-07-08 PROCEDURE — 85025 COMPLETE CBC W/AUTO DIFF WBC: CPT | Performed by: EMERGENCY MEDICINE

## 2023-07-08 PROCEDURE — 83880 ASSAY OF NATRIURETIC PEPTIDE: CPT | Performed by: EMERGENCY MEDICINE

## 2023-07-08 PROCEDURE — 84484 ASSAY OF TROPONIN QUANT: CPT | Performed by: EMERGENCY MEDICINE

## 2023-07-08 RX ORDER — ENOXAPARIN SODIUM 150 MG/ML
1 INJECTION SUBCUTANEOUS EVERY 12 HOURS
Status: DISCONTINUED | OUTPATIENT
Start: 2023-07-08 | End: 2023-07-10

## 2023-07-08 RX ORDER — BETAMETHASONE VALERATE 1.2 MG/G
CREAM TOPICAL 2 TIMES DAILY PRN
COMMUNITY
End: 2024-09-13

## 2023-07-08 RX ORDER — ASPIRIN 325 MG
325 TABLET, DELAYED RELEASE (ENTERIC COATED) ORAL EVERY 6 HOURS PRN
Status: ON HOLD | COMMUNITY
End: 2023-07-10

## 2023-07-08 RX ORDER — DOXAZOSIN 4 MG/1
4 TABLET ORAL AT BEDTIME
Status: DISCONTINUED | OUTPATIENT
Start: 2023-07-08 | End: 2023-07-10 | Stop reason: HOSPADM

## 2023-07-08 RX ORDER — TERBINAFINE HYDROCHLORIDE 250 MG/1
250 TABLET ORAL DAILY
COMMUNITY
End: 2023-07-08

## 2023-07-08 RX ORDER — HEPARIN SODIUM 10000 [USP'U]/100ML
0-5000 INJECTION, SOLUTION INTRAVENOUS CONTINUOUS
Status: DISCONTINUED | OUTPATIENT
Start: 2023-07-08 | End: 2023-07-08

## 2023-07-08 RX ORDER — ASPIRIN 325 MG
325 TABLET ORAL ONCE
Status: COMPLETED | OUTPATIENT
Start: 2023-07-08 | End: 2023-07-08

## 2023-07-08 RX ORDER — IOPAMIDOL 755 MG/ML
100 INJECTION, SOLUTION INTRAVASCULAR ONCE
Status: DISCONTINUED | OUTPATIENT
Start: 2023-07-08 | End: 2023-07-08 | Stop reason: DRUGHIGH

## 2023-07-08 RX ORDER — IOPAMIDOL 755 MG/ML
75 INJECTION, SOLUTION INTRAVASCULAR ONCE
Status: COMPLETED | OUTPATIENT
Start: 2023-07-08 | End: 2023-07-08

## 2023-07-08 RX ORDER — ATORVASTATIN CALCIUM 40 MG/1
40 TABLET, FILM COATED ORAL AT BEDTIME
Status: DISCONTINUED | OUTPATIENT
Start: 2023-07-08 | End: 2023-07-10 | Stop reason: HOSPADM

## 2023-07-08 RX ORDER — LIDOCAINE 40 MG/G
CREAM TOPICAL
Status: DISCONTINUED | OUTPATIENT
Start: 2023-07-08 | End: 2023-07-10 | Stop reason: HOSPADM

## 2023-07-08 RX ORDER — METOPROLOL SUCCINATE 25 MG/1
50 TABLET, EXTENDED RELEASE ORAL AT BEDTIME
Status: DISCONTINUED | OUTPATIENT
Start: 2023-07-08 | End: 2023-07-10 | Stop reason: HOSPADM

## 2023-07-08 RX ADMIN — METOPROLOL SUCCINATE 50 MG: 25 TABLET, EXTENDED RELEASE ORAL at 21:48

## 2023-07-08 RX ADMIN — PERFLUTREN 3 ML: 6.52 INJECTION, SUSPENSION INTRAVENOUS at 15:19

## 2023-07-08 RX ADMIN — DOXAZOSIN 4 MG: 4 TABLET ORAL at 21:48

## 2023-07-08 RX ADMIN — ASPIRIN 325 MG ORAL TABLET 325 MG: 325 PILL ORAL at 09:55

## 2023-07-08 RX ADMIN — IOPAMIDOL 75 ML: 755 INJECTION, SOLUTION INTRAVENOUS at 11:51

## 2023-07-08 RX ADMIN — ENOXAPARIN SODIUM 120 MG: 150 INJECTION SUBCUTANEOUS at 13:09

## 2023-07-08 RX ADMIN — ATORVASTATIN CALCIUM 40 MG: 40 TABLET, FILM COATED ORAL at 21:48

## 2023-07-08 ASSESSMENT — ACTIVITIES OF DAILY LIVING (ADL)
WALKING_OR_CLIMBING_STAIRS_DIFFICULTY: NO
DESCRIBE_HEARING_LOSS: BILATERAL HEARING LOSS
DOING_ERRANDS_INDEPENDENTLY_DIFFICULTY: NO
TOILETING_ISSUES: NO
FALL_HISTORY_WITHIN_LAST_SIX_MONTHS: NO
ADLS_ACUITY_SCORE: 20
THE_FOLLOWING_AIDS_WERE_PROVIDED;: PATIENT DECLINED OFFER OF AUXILIARY AIDS
ADLS_ACUITY_SCORE: 20
CONCENTRATING,_REMEMBERING_OR_MAKING_DECISIONS_DIFFICULTY: NO
WERE_AUXILIARY_AIDS_OFFERED?: YES
ADLS_ACUITY_SCORE: 35
CHANGE_IN_FUNCTIONAL_STATUS_SINCE_ONSET_OF_CURRENT_ILLNESS/INJURY: NO
DIFFICULTY_COMMUNICATING: NO
HEARING_DIFFICULTY_OR_DEAF: YES
WEAR_GLASSES_OR_BLIND: NO
ADLS_ACUITY_SCORE: 20
DIFFICULTY_EATING/SWALLOWING: NO
DRESSING/BATHING_DIFFICULTY: NO
PATIENT'S_PREFERRED_MEANS_OF_COMMUNICATION: VERBAL
ADLS_ACUITY_SCORE: 35

## 2023-07-08 ASSESSMENT — ENCOUNTER SYMPTOMS
NAUSEA: 1
DIZZINESS: 1
SHORTNESS OF BREATH: 1
LIGHT-HEADEDNESS: 1
PALPITATIONS: 0
ABDOMINAL PAIN: 0
APNEA: 0

## 2023-07-08 NOTE — ED TRIAGE NOTES
Pt called EMS for c/o sob, nausea and dizziness with exertion. EMS noted pt's O2 to be 77% while walking. At rest O2 88-91%. Denies chest pain. Denies any symptoms currently, only has the sob, nausea and dizziness when walking. States he noticed that his stamina has been worsening over the past year. No known medical problems besides hypertension and high cholesterol. A&O x4.      Triage Assessment     Row Name 07/08/23 1312       Triage Assessment (Adult)    Airway WDL WDL       Respiratory WDL    Respiratory WDL X  sob w/ exertion       Skin Circulation/Temperature WDL    Skin Circulation/Temperature WDL WDL       Cardiac WDL    Cardiac WDL WDL       Cognitive/Neuro/Behavioral WDL    Cognitive/Neuro/Behavioral WDL WDL

## 2023-07-08 NOTE — H&P
"[facility]    History and Physical - Hospitalist Service       Date of Admission:  7/8/2023    Assessment & Plan      Sharad Yi is a 73 year old male presenting with a chief complaint of  dyspnea.  Evaluation is notable for PE.  He is currently clinically stable.  No clear cause for PE, thus unprovoked.  Start therapeutic enoxaparin.    # Acute PE, unprovoked  - therapeutic enoxaparin  - anticipate lifelong anticoagulation    # BPH  - doxazosin    # HLD  - statin    # HTN  - metoprolol       Diet: Regular Diet Adult  Tillman Catheter: Not present  Lines: None     Cardiac Monitoring: None  Code Status: Full Code    Clinically Significant Risk Factors Present on Admission                # Drug Induced Platelet Defect: home medication list includes an antiplatelet medication   # Hypertension: Noted on problem list      # Obesity: Estimated body mass index is 38.01 kg/m  as calculated from the following:    Height as of this encounter: 1.727 m (5' 8\").    Weight as of this encounter: 113.4 kg (250 lb).            Disposition Plan      Expected Discharge Date: 07/10/2023                  Suhail May MD  Hospitalist Service  [facility]  Securely message with [employer] (more info)  Text page via [employer] Paging/Directory     ______________________________________________________________________    Chief Complaint   Dyspnea    History is obtained from the patient    History of Present Illness   Sharad Yi is a 73 year old male with history of BPH, chronic knee pain s/p MCL tear, and chronic intermittent tremor in the hand.    He reports he was walking to his car today and developed shortness of breath.  He had associated dizziness and diaphoresis.  He had some improvement with sitting down.  He then called 911 and presented to the emergency department.  He reports for the last year has had dyspnea.  He has decreased activity tolerance.  The dyspnea has been " progressive over the last year.  He denies having a significant change in breathing in the few weeks prior to today.  He denies travel since March.  Denies accidents or injury.  Denies a history of blood clots.  He had hip surgery in Nov 2022.  He has not had surgery since.  Denies a history of cancer.  Last year he had an episode of hematuria which resolved after a day.    Denies chest pain/pressure, abdominal pain, nausea, or vomiting.  Denies hemoptysis.      Past Medical History    Past Medical History:   Diagnosis Date     BPH (benign prostatic hyperplasia)      Chronic knee pain        Past Surgical History   Past Surgical History:   Procedure Laterality Date     MENISCECTOMY Right      ZZC APPENDECTOMY      Description: Appendectomy;  Recorded: 02/05/2013;       Prior to Admission Medications   Prior to Admission Medications   Prescriptions Last Dose Informant Patient Reported? Taking?   Cholecalciferol (VITAMIN D3 PO) 7/7/2023 at PM  Yes Yes   Sig: Take 400 Units by mouth daily   aspirin (ASA) 325 MG EC tablet 7/7/2023 at PM  Yes Yes   Sig: Take 325 mg by mouth every 6 hours as needed for moderate pain (Knee)   atorvastatin (LIPITOR) 40 MG tablet 7/7/2023 at PM  No Yes   Sig: Take 1 tablet (40 mg) by mouth At Bedtime   betamethasone valerate (VALISONE) 0.1 % external cream   Yes Yes   Sig: Apply topically 2 times daily as needed (for knee pain)   diphenhydrAMINE-acetaminophen (TYLENOL PM)  MG tablet 7/7/2023 at PM  Yes Yes   Sig: Take 1 tablet by mouth nightly as needed for sleep   doxazosin (CARDURA) 4 MG tablet 7/7/2023 at PM  No Yes   Sig: TAKE 1 TABLET(4 MG) BY MOUTH AT BEDTIME Strength: 4 mg   metoprolol succinate ER (TOPROL XL) 50 MG 24 hr tablet 7/7/2023 at PM  No Yes   Sig: Take 1 tablet (50 mg) by mouth daily   Patient taking differently: Take 50 mg by mouth At Bedtime      Facility-Administered Medications Last Administration Doses Remaining   betamethasone acet & sod phos (CELESTONE)  injection 6 mg 8/4/2022  8:00 AM               Physical Exam   Vital Signs: Temp: 97.5  F (36.4  C) Temp src: Oral BP: (!) 143/87 Pulse: 112   Resp: 16 SpO2: (!) 81 % O2 Device: None (Room air) (with activity)    Weight: 250 lbs 0 oz    Gen:  Lying comfortably in bed, nad  Neuro: alert, conversant  CV:  Nl rate, regular rhythm  Pulm: no acute resp distress, ctab  GI: abdomen soft, NTTP    Medical Decision Making             Data

## 2023-07-08 NOTE — ED NOTES
Patient ambulated to bathroom on pulse oximetry and sats noted to be as low as 79% and patient appeared short of breath. Laid back in bed and sats improved to 95% on 2L NC.

## 2023-07-08 NOTE — ED NOTES
Bed: [facility]-02  Expected date: 7/8/23  Expected time: 9:29 AM  Means of arrival: Ambulance  Comments:  WB/SOB

## 2023-07-08 NOTE — PHARMACY-ADMISSION MEDICATION HISTORY
Pharmacy Intern Admission Medication History    Admission medication history is complete. The information provided in this note is only as accurate as the sources available at the time of the update.    Medication reconciliation/reorder completed by provider prior to medication history? No    Information Source(s): Patient and CareEverywhere/SureScripts via in-person    Pertinent Information: the betamethasone cream is for his knee but hasn't used it recently.   Terbanifine: isn't taking because the provider wasn't sure about his current medications with it. He has the bottle of the medication but has not been taking it,     Changes made to PTA medication list:    Added: tylenol PM, aspirin, terbanafine    Deleted: None    Changed: None    Medication Affordability:  Not including over the counter (OTC) medications, was there a time in the past 3 months when you did not take your medications as prescribed because of cost?: No    Allergies reviewed with patient and updates made in EHR: yes    Medication History Completed By: Marguerite Jeong 7/8/2023 10:22 AM    Prior to Admission medications    Medication Sig Last Dose Taking? Auth Provider Long Term End Date   aspirin (ASA) 325 MG EC tablet Take 325 mg by mouth every 6 hours as needed for moderate pain (Knee) 7/7/2023 at PM Yes Unknown, Entered By History     atorvastatin (LIPITOR) 40 MG tablet Take 1 tablet (40 mg) by mouth At Bedtime 7/7/2023 at PM Yes Leonardo Perez MD Yes    betamethasone valerate (VALISONE) 0.1 % external cream Apply topically 2 times daily More than a month at unsure Yes Leonardo Perez MD     Cholecalciferol (VITAMIN D3 PO) Take by mouth daily 7/7/2023 at PM Yes Reported, Patient     diphenhydrAMINE-acetaminophen (TYLENOL PM)  MG tablet Take 1 tablet by mouth nightly as needed for sleep 7/7/2023 at PM Yes Unknown, Entered By History     doxazosin (CARDURA) 4 MG tablet TAKE 1 TABLET(4 MG) BY MOUTH AT BEDTIME Strength: 4 mg  7/7/2023 at PM Yes Leonardo Perez MD Yes    metoprolol succinate ER (TOPROL XL) 50 MG 24 hr tablet Take 1 tablet (50 mg) by mouth daily 7/7/2023 at PM Yes Leonardo Perez MD Yes    terbinafine (LAMISIL) 250 MG tablet Take 250 mg by mouth daily  at hasnt started  Unknown, Entered By History

## 2023-07-09 LAB
ANION GAP SERPL CALCULATED.3IONS-SCNC: 11 MMOL/L (ref 5–18)
BUN SERPL-MCNC: 13 MG/DL (ref 8–28)
CALCIUM SERPL-MCNC: 9.1 MG/DL (ref 8.5–10.5)
CHLORIDE BLD-SCNC: 106 MMOL/L (ref 98–107)
CO2 SERPL-SCNC: 24 MMOL/L (ref 22–31)
CREAT SERPL-MCNC: 0.86 MG/DL (ref 0.7–1.3)
ERYTHROCYTE [DISTWIDTH] IN BLOOD BY AUTOMATED COUNT: 13.6 % (ref 10–15)
GFR SERPL CREATININE-BSD FRML MDRD: >90 ML/MIN/1.73M2
GLUCOSE BLD-MCNC: 103 MG/DL (ref 70–125)
HCT VFR BLD AUTO: 42.3 % (ref 40–53)
HGB BLD-MCNC: 14.3 G/DL (ref 13.3–17.7)
MCH RBC QN AUTO: 31.5 PG (ref 26.5–33)
MCHC RBC AUTO-ENTMCNC: 33.8 G/DL (ref 31.5–36.5)
MCV RBC AUTO: 93 FL (ref 78–100)
PLATELET # BLD AUTO: 153 10E3/UL (ref 150–450)
POTASSIUM BLD-SCNC: 4.4 MMOL/L (ref 3.5–5)
RBC # BLD AUTO: 4.54 10E6/UL (ref 4.4–5.9)
SODIUM SERPL-SCNC: 141 MMOL/L (ref 136–145)
WBC # BLD AUTO: 7.5 10E3/UL (ref 4–11)

## 2023-07-09 PROCEDURE — 85027 COMPLETE CBC AUTOMATED: CPT | Performed by: HOSPITALIST

## 2023-07-09 PROCEDURE — 36415 COLL VENOUS BLD VENIPUNCTURE: CPT | Performed by: HOSPITALIST

## 2023-07-09 PROCEDURE — 80048 BASIC METABOLIC PNL TOTAL CA: CPT | Performed by: HOSPITALIST

## 2023-07-09 PROCEDURE — 99233 SBSQ HOSP IP/OBS HIGH 50: CPT | Performed by: HOSPITALIST

## 2023-07-09 PROCEDURE — 250N000011 HC RX IP 250 OP 636: Mod: JZ | Performed by: HOSPITALIST

## 2023-07-09 PROCEDURE — 250N000013 HC RX MED GY IP 250 OP 250 PS 637: Performed by: HOSPITALIST

## 2023-07-09 PROCEDURE — 120N000013 HC R&B IMCU

## 2023-07-09 RX ORDER — ACETAMINOPHEN 325 MG/1
975 TABLET ORAL ONCE
Status: COMPLETED | OUTPATIENT
Start: 2023-07-09 | End: 2023-07-09

## 2023-07-09 RX ADMIN — ENOXAPARIN SODIUM 120 MG: 150 INJECTION SUBCUTANEOUS at 00:30

## 2023-07-09 RX ADMIN — ACETAMINOPHEN 975 MG: 325 TABLET ORAL at 11:45

## 2023-07-09 RX ADMIN — ENOXAPARIN SODIUM 120 MG: 150 INJECTION SUBCUTANEOUS at 14:04

## 2023-07-09 RX ADMIN — DOXAZOSIN 4 MG: 4 TABLET ORAL at 21:28

## 2023-07-09 RX ADMIN — ATORVASTATIN CALCIUM 40 MG: 40 TABLET, FILM COATED ORAL at 21:28

## 2023-07-09 RX ADMIN — METOPROLOL SUCCINATE 50 MG: 25 TABLET, EXTENDED RELEASE ORAL at 21:28

## 2023-07-09 ASSESSMENT — ACTIVITIES OF DAILY LIVING (ADL)
ADLS_ACUITY_SCORE: 20
DEPENDENT_IADLS:: INDEPENDENT
ADLS_ACUITY_SCORE: 20

## 2023-07-09 NOTE — PROGRESS NOTES
"[facility]    Medicine Progress Note - Hospitalist Service    Date of Admission:  7/8/2023    Assessment & Plan    Sharad Yi is a 73 year old male admitted with unprovoked PE.  He is currently clinically stable, with mild hypoxia.  Continue current management.    # Acute PE, unprovoked  - therapeutic enoxaparin  - anticipate lifelong anticoagulation     # BPH  - doxazosin     # HLD  - statin     # HTN  - metoprolol    # LV diastolic dysfunction  - outpatient cardiology followup          Diet: Regular Diet Adult    Tillman Catheter: Not present  Lines: None     Cardiac Monitoring: None  Code Status: Full Code      Clinically Significant Risk Factors Present on Admission                # Drug Induced Platelet Defect: home medication list includes an antiplatelet medication   # Hypertension: Noted on problem list      # Obesity: Estimated body mass index is 39.44 kg/m  as calculated from the following:    Height as of this encounter: 1.727 m (5' 8\").    Weight as of this encounter: 117.7 kg (259 lb 6.4 oz).            Disposition Plan      Expected Discharge Date: 07/10/2023      Destination: home  Discharge Comments: PE's, on Lovenox, wean O2          Suhail May MD  Hospitalist Service  [facility]  Securely message with [employer] (more info)  Text page via [employer] Paging/Directory   ______________________________________________________________________    Interval History   Denies chest pain, SOB, or abdominal pain.    Physical Exam   Vital Signs: Temp: 97.4  F (36.3  C) Temp src: Oral BP: 134/81 Pulse: 80   Resp: 18 SpO2: 97 % O2 Device: Nasal cannula Oxygen Delivery: 2 LPM  Weight: 259 lbs 6.4 oz    Gen: sitting comfortably in chair, nad  Neuro: alert, conversant  CV: nl rate, regular rhythm  Pulm: no acute resp distress, ctab  GI:  Abdomen soft, NTTP    Medical Decision Making             Data   Reviewed:  Na 141  K 4.4  BUN 13  Cr 0.86    WBC 7.5  Hgb " 14  Plts 153    TTE  The left ventricle is normal in size.  There is mild to moderate concentric left ventricular hypertrophy.  The visual ejection fraction is 60-65%.  No regional wall motion abnormalities noted.  The right ventricle is mild to moderately dilated.  Mildly decreased right ventricular systolic function  TAPSE is abnormal, which is consistent with abnormal right ventricular  systolic function.  The ascending aorta is Borderline dilated.  IVC diameter and respiratory changes fall into an intermediate range  suggesting an RA pressure of 8 mmHg.  Sinus rhythm was noted.  There is no comparison study available.

## 2023-07-09 NOTE — PLAN OF CARE
Goal Outcome Evaluation:      Problem: Plan of Care - These are the overarching goals to be used throughout the patient stay.    Goal: Optimal Comfort and Wellbeing  Outcome: Progressing     Problem: Gas Exchange Impaired  Goal: Optimal Gas Exchange  Outcome: Progressing     Problem: VTE (Venous Thromboembolism)  Goal: VTE (Venous Thromboembolism) Symptom Resolution  Outcome: Progressing  Patient is up independently in his room. Tolerating activity well at this time. Does report some dyspnea with exertion but also states improvement at this time. Continues to get lovenox as ordered. Able to wean patient to room air today - tolerating well at this time. Voiding. Call light placed within reach and purposeful rounding performed. Jolene Ortega RN

## 2023-07-09 NOTE — PLAN OF CARE
Pt VSS during the night. Denies pain. Shortness of breath when ambulating to and from toilet in room. Remains afebrile. A&O, able to make needs known. Rested with eyes closed between assessments.     Problem: Plan of Care - These are the overarching goals to be used throughout the patient stay.    Goal: Absence of Hospital-Acquired Illness or Injury  Outcome: Progressing  Intervention: Identify and Manage Fall Risk  Recent Flowsheet Documentation  Taken 7/9/2023 0000 by Brigette Vasquez, RN  Safety Promotion/Fall Prevention:   assistive device/personal items within reach   clutter free environment maintained   lighting adjusted   mobility aid in reach   nonskid shoes/slippers when out of bed   patient and family education   room near nurse's station   room organization consistent   safety round/check completed   treat reversible contributory factors   treat underlying cause  Taken 7/8/2023 2000 by Brigette Vasquez, RN  Safety Promotion/Fall Prevention:   assistive device/personal items within reach   clutter free environment maintained   lighting adjusted   mobility aid in reach   nonskid shoes/slippers when out of bed   patient and family education   room near nurse's station   room organization consistent   safety round/check completed   treat reversible contributory factors   treat underlying cause  Intervention: Prevent Skin Injury  Recent Flowsheet Documentation  Taken 7/8/2023 2300 by Brigette Vasquez, RN  Body Position: position changed independently  Taken 7/8/2023 2000 by Brigette Vasquez, RN  Body Position: position changed independently  Goal: Optimal Comfort and Wellbeing  Outcome: Progressing     Problem: Risk for Delirium  Goal: Improved Sleep  Outcome: Progressing   Goal Outcome Evaluation:

## 2023-07-09 NOTE — PROGRESS NOTES
Care Management Initial Consult    General Information  Assessment completed with: Patient,    Type of CM/SW Visit: Initial Assessment    Primary Care Provider verified and updated as needed: Yes   Readmission within the last 30 days: no previous admission in last 30 days      Reason for Consult: discharge planning  Advance Care Planning:            Communication Assessment  Patient's communication style: spoken language (English or Bilingual)    Hearing Difficulty or Deaf: yes   Wear Glasses or Blind: no    Cognitive  Cognitive/Neuro/Behavioral: WDL                      Living Environment:   People in home: sibling(s)     Current living Arrangements: house      Able to return to prior arrangements: yes       Family/Social Support:  Care provided by: self  Provides care for: no one     Sibling(s), Children          Description of Support System: Supportive, Involved         Current Resources:   Patient receiving home care services: No     Community Resources: None  Equipment currently used at home: none  Supplies currently used at home: None    Employment/Financial:  Employment Status: retired        Financial Concerns:             Does the patient's insurance plan have a 3 day qualifying hospital stay waiver?  Yes   Will the waiver be used for post-acute placement? No    Lifestyle & Psychosocial Needs:  Social Determinants of Health     Tobacco Use: Medium Risk (7/8/2023)    Patient History      Smoking Tobacco Use: Former      Smokeless Tobacco Use: Never      Passive Exposure: Not on file   Alcohol Use: Not on file   Financial Resource Strain: Not on file   Food Insecurity: Not on file   Transportation Needs: Not on file   Physical Activity: Not on file   Stress: Not on file   Social Connections: Not on file   Intimate Partner Violence: Not on file   Depression: Not at risk (12/8/2022)    PHQ-2      PHQ-2 Score: 0   Housing Stability: Not on file       Functional Status:  Prior to admission patient needed  assistance:   Dependent ADLs:: Independent  Dependent IADLs:: Independent       Mental Health Status:          Chemical Dependency Status:                Values/Beliefs:  Spiritual, Cultural Beliefs, Tenriism Practices, Values that affect care: no               Additional Information:  Assessed.  Pt lives in a house with brother Cm.  Pt independent with all I/ADLs at baseline, still drives.  Pt has a standard walker and a wheelchair in the home; does not use.  Pt is retired.  Pt has 7 siblings who all live locally, and one adult son Randy who lives locally.    Pt anticipates discharge to home with family transport, hopeful to wean off oxygen prior to discharge.    CM will continue to follow care progression and aide in discharge planning as needed.       Toya Ruiz RN

## 2023-07-10 VITALS
RESPIRATION RATE: 18 BRPM | HEART RATE: 89 BPM | HEIGHT: 68 IN | BODY MASS INDEX: 38.78 KG/M2 | OXYGEN SATURATION: 96 % | TEMPERATURE: 98.2 F | WEIGHT: 255.9 LBS | DIASTOLIC BLOOD PRESSURE: 89 MMHG | SYSTOLIC BLOOD PRESSURE: 133 MMHG

## 2023-07-10 PROCEDURE — 250N000011 HC RX IP 250 OP 636: Mod: JZ | Performed by: HOSPITALIST

## 2023-07-10 PROCEDURE — 99238 HOSP IP/OBS DSCHRG MGMT 30/<: CPT | Performed by: HOSPITALIST

## 2023-07-10 PROCEDURE — 250N000013 HC RX MED GY IP 250 OP 250 PS 637: Performed by: HOSPITALIST

## 2023-07-10 PROCEDURE — 99207 PR NO BILLABLE SERVICE THIS VISIT: CPT | Performed by: HOSPITALIST

## 2023-07-10 RX ORDER — ENOXAPARIN SODIUM 150 MG/ML
1 INJECTION SUBCUTANEOUS EVERY 12 HOURS
Status: DISCONTINUED | OUTPATIENT
Start: 2023-07-10 | End: 2023-07-10 | Stop reason: HOSPADM

## 2023-07-10 RX ORDER — APIXABAN 5 MG (74)
KIT ORAL
Qty: 88 EACH | Refills: 0 | Status: SHIPPED | OUTPATIENT
Start: 2023-07-10 | End: 2023-08-09

## 2023-07-10 RX ADMIN — ENOXAPARIN SODIUM 120 MG: 150 INJECTION SUBCUTANEOUS at 11:09

## 2023-07-10 RX ADMIN — ENOXAPARIN SODIUM 120 MG: 150 INJECTION SUBCUTANEOUS at 00:50

## 2023-07-10 RX ADMIN — ACETAMINOPHEN: 500 TABLET ORAL at 00:50

## 2023-07-10 ASSESSMENT — ACTIVITIES OF DAILY LIVING (ADL)
ADLS_ACUITY_SCORE: 20

## 2023-07-10 NOTE — PLAN OF CARE
[id number]: A/ox4. Denies pain/SOB. Sating well on RA. Up independently. Makes needs known. No acute events on shift.       Problem: Plan of Care - These are the overarching goals to be used throughout the patient stay.    Goal: Optimal Comfort and Wellbeing  Outcome: Progressing     Problem: Gas Exchange Impaired  Goal: Optimal Gas Exchange  Outcome: Progressing     Problem: VTE (Venous Thromboembolism)  Goal: VTE (Venous Thromboembolism) Symptom Resolution  Outcome: Progressing

## 2023-07-10 NOTE — DISCHARGE SUMMARY
"[facility]  Hospitalist Discharge Summary      Date of Admission:  7/8/2023  Date of Discharge:  7/10/2023  Discharging Provider: Suhail May MD  Discharge Service: Hospitalist Service    Discharge Diagnoses   Acute pulmonary embolism  Acute hypoxic respiratory failure    Clinically Significant Risk Factors     # Obesity: Estimated body mass index is 38.91 kg/m  as calculated from the following:    Height as of this encounter: 1.727 m (5' 8\").    Weight as of this encounter: 116.1 kg (255 lb 14.4 oz).       Follow-ups Needed After Discharge   Follow-up Appointments     Follow-up and recommended labs and tests       Follow up with primary care provider, Leonardo Perez, within 7 days   for hospital follow- up.    - consider outpatient sleep medicine evaluation            Unresulted Labs Ordered in the Past 30 Days of this Admission     No orders found from 6/8/2023 to 7/9/2023.          Discharge Disposition   Discharged to home  Condition at discharge: Stable    Hospital Course   The patient was admitted with hypoxia in the setting of acute pulmonary embolism.  He was treated with therapeutic enoxaparin.  His hypoxia improved and he was discharged home to transition to apixaban.    Consultations This Hospital Stay   PHARMACY IP CONSULT    Code Status   Full Code    Time Spent on this Encounter   I, Suhail May MD, personally saw the patient today and spent less than or equal to 30 minutes discharging this patient.       Suhail May MD  [facility] 3 ICU  [address] [place] [id number]  Phone: [phone]  Fax: [phone]  ______________________________________________________________________    Physical Exam   Vital Signs: Temp: 97.9  F (36.6  C) Temp src: Oral BP: (!) 141/93 Pulse: 81   Resp: 18 SpO2: 97 % O2 Device: Nasal cannula Oxygen Delivery: 1 LPM  Weight: 255 lbs 14.4 oz    See progress note       Primary Care Physician "   Leonardo Perez    Discharge Orders      Reason for your hospital stay    You were admitted to the hospital for a blood clot in the lungs.  You were treated with blood thinners and discharged home.     Activity    Your activity upon discharge: activity as tolerated     When to contact your care team    Seek medical attention if you have any of the following: difficulty breathing, chest pain, dizziness, light headedness, or chest pressure.     Follow-up and recommended labs and tests     Follow up with primary care provider, Leonardo Perez, within 7 days for hospital follow- up.    - consider outpatient sleep medicine evaluation     Diet    Follow this diet upon discharge: Orders Placed This Encounter      Regular Diet Adult       Significant Results and Procedures   Most Recent 3 CBC's:Recent Labs   Lab Test 07/09/23  0407 07/08/23  1003 10/28/22  1112   WBC 7.5 6.1 6.1   HGB 14.3 14.6 14.6   MCV 93 94 94    153 206     Most Recent 3 BMP's:Recent Labs   Lab Test 07/09/23  0407 07/08/23  1003 12/08/22  1105    139 143   POTASSIUM 4.4 4.2 4.1   CHLORIDE 106 107 109*   CO2 24 22 24   BUN 13 19 22.1   CR 0.86 0.98 0.90   ANIONGAP 11 10 10   BRIANDA 9.1 9.0 8.7*    116 96   ,   Results for orders placed or performed during the hospital encounter of 07/08/23   CT Chest Pulmonary Embolism w Contrast     Value    Radiologist flags Bilateral pulmonary embolism. (AA)    Narrative    EXAM: CT CHEST PULMONARY EMBOLISM WITH CONTRAST  LOCATION: [facility]  DATE: 07/08/2023    INDICATION: Shortness of breath, dyspnea on exertion, elevated d-dimer.  COMPARISON: None.  TECHNIQUE: CT chest pulmonary angiogram during arterial phase injection of IV contrast. Multiplanar reformats and MIP reconstructions were performed. Dose reduction techniques were used.   CONTRAST: 75 mL Isovue 370.    FINDINGS:  ANGIOGRAM CHEST: Prominent right and left pulmonary emboli leading to the distal portions  of the main pulmonary arteries and then extending to all lobes of both lungs. Mildly dilated main pulmonary artery root measuring 3.6 cm. RV/LV ratio is 0.9, within   normal limits. Thoracic aorta is negative for dissection.    LUNGS AND PLEURA: No effusion. No dense lobar consolidation. Minimal patchy groundglass opacities at the bilateral lungs..    MEDIASTINUM/AXILLAE: No adenopathy or additional acute abnormality. No fluid collection.    CORONARY ARTERY CALCIFICATION: Moderate.    UPPER ABDOMEN: [Hepatic cysts. No acute upper abdominal abnormality identified. Tiny cholelithiasis.    MUSCULOSKELETAL: Diffuse degenerative changes of the spine.      Impression    IMPRESSION:  1.  Prominent bilateral pulmonary embolism burden leading to the distal portions of the right and left main pulmonary arteries. Mildly dilated main pulmonary artery root. Correlate with any evidence of cardiac strain.  2.  Mild patchy groundglass opacities in both lungs may be mild edema.  3.  Small cholelithiasis.      [Critical Result: Bilateral pulmonary embolism.]    Finding was identified on 07/08/2023 at 12:10 PM CDT.     1.  Dr. Delgadillo was contacted by me on 07/08/2023 at 12:15 PM CDT and verbalized understanding of the critical result.      US Lower Extremity Venous Duplex Bilateral    Narrative    EXAM: US LOWER EXTREMITY VENOUS DUPLEX BILATERAL  LOCATION: [facility]  DATE: 7/8/2023    INDICATION: Right knee pain, pulmonary emboli  COMPARISON: None.  TECHNIQUE: Venous Duplex ultrasound of bilateral lower extremities with and without compression, augmentation and duplex. Color flow and spectral Doppler with waveform analysis performed.    FINDINGS: Exam includes the common femoral, femoral, popliteal veins as well as segmentally visualized deep calf veins and greater saphenous vein.     RIGHT: There is some mild acute calf vein DVT in the peroneal vein of the calf. Nothing for DVT above the calf.     Superficial  thrombophlebitis in the right lesser saphenous vein. No popliteal cyst.    LEFT: No deep vein thrombosis. No superficial thrombophlebitis. No popliteal cyst.      Impression    IMPRESSION:  1.  The primary finding is some moderate superficial thrombophlebitis in the lesser saphenous vein.    2.  Mild acute DVT in the peroneal vein of the calf. Nothing for DVT above the calf.    Report called to Kristi Antonio RNat 2:19 PM.   Echocardiogram Complete     Value    LVEF  60-65%    [facility]    [id number]  [id number]  [id number]^VICENTA^[place] [address]     Name: MAINOR SANTOS  MRN: [id number]  : 1950  Study Date: 2023 03:03 PM  Age: 73 yrs  Gender: Male  Patient Location: [facility]  Reason For Study: Pulmonary Embolism  Ordering Physician: LEONARDO YADAV  Performed By: IVETTE     BSA: 2.2 m2  Height: 68 in  Weight: 250 lb  HR: 79  BP: 133/85 mmHg  ______________________________________________________________________________  Procedure  Complete Portable Echo Adult. Definity (NDC #07533-749) given intravenously.  ______________________________________________________________________________  Interpretation Summary     The left ventricle is normal in size.  There is mild to moderate concentric left ventricular hypertrophy.  The visual ejection fraction is 60-65%.  No regional wall motion abnormalities noted.  The right ventricle is mild to moderately dilated.  Mildly decreased right ventricular systolic function  TAPSE is abnormal, which is consistent with abnormal right ventricular  systolic function.  The ascending aorta is Borderline dilated.  IVC diameter and respiratory changes fall into an intermediate range  suggesting an RA pressure of 8 mmHg.  Sinus rhythm was noted.  There is no comparison study available.  ______________________________________________________________________________  Left Ventricle  The left ventricle is normal in size. There is  mild to moderate concentric  left ventricular hypertrophy. Diastolic Doppler findings (E/E' ratio and/or  other parameters) suggest left ventricular filling pressures are  indeterminate. The visual ejection fraction is 60-65%. No regional wall motion  abnormalities noted.     Right Ventricle  The right ventricle is mild to moderately dilated. TAPSE is abnormal, which is  consistent with abnormal right ventricular systolic function. Mildly decreased  right ventricular systolic function.     Atria  Normal left atrial size. Right atrium not well visualized.     Mitral Valve  There is mild mitral annular calcification. There is trace mitral  regurgitation. There is no mitral valve stenosis.     Tricuspid Valve  The tricuspid valve is not well visualized. There is mild (1+) tricuspid  regurgitation. There is no tricuspid stenosis.     Aortic Valve  The aortic valve is not well visualized. The aortic valve is trileaflet. No  aortic regurgitation is present. No aortic stenosis is present.     Pulmonic Valve  The pulmonic valve is not well visualized. There is no pulmonic valvular  stenosis.     Vessels  The ascending aorta is Borderline dilated. Normal size ascending aorta. IVC  diameter and respiratory changes fall into an intermediate range suggesting an  RA pressure of 8 mmHg.     Pericardium  There is no pericardial effusion.     Rhythm  Sinus rhythm was noted.  ______________________________________________________________________________  MMode/2D Measurements & Calculations     RVDd: 4.3 cm  IVSd: 1.2 cm  LVIDd: 4.8 cm  LVIDs: 3.0 cm  LVPWd: 1.4 cm  FS: 38.3 %  LV mass(C)d: 246.7 grams  LV mass(C)dI: 109.8 grams/m2  Ao root diam: 3.8 cm  LA dimension: 3.4 cm  asc Aorta Diam: 3.1 cm  LA/Ao: 0.89  LVOT diam: 2.2 cm  LVOT area: 3.8 cm2  LA Volume Indexed (AL/bp): 18.9 ml/m2  RWT: 0.58  TAPSE: 1.6 cm     Time Measurements  MM HR: 80.0 BPM     Doppler Measurements & Calculations  MV E max diane: 45.9 cm/sec  MV A max diane:  67.9 cm/sec  MV E/A: 0.68  MV dec time: 0.20 sec  LV V1 max PG: 3.1 mmHg  LV V1 max: 87.4 cm/sec  LV V1 VTI: 13.0 cm  SV(LVOT): 49.7 ml  SI(LVOT): 22.1 ml/m2  PA acc time: 0.04 sec  E/E' av.2  Lateral E/e': 4.8  Medial E/e': 9.6  RV S Ata: 15.9 cm/sec     ______________________________________________________________________________  Report approved by: Aleida Simmons 2023 04:30 PM               Discharge Medications   Current Discharge Medication List      START taking these medications    Details   apixaban ANTICOAGULANT (ELIQUIS) 5 MG tablet Take 1 tablet (5 mg) by mouth 2 times daily  Qty: 60 tablet, Refills: 0    Comments: Do not start until the apixaban starter pack is complete (on 2023).  This is to continue maintenance anticoagulation on apixaban.  Associated Diagnoses: Acute pulmonary embolism, unspecified pulmonary embolism type, unspecified whether acute cor pulmonale present (H)      Apixaban Starter Pack (ELIQUIS DVT/PE STARTER PACK) 5 MG TBPK Take 10 mg by mouth 2 times daily for 7 days, THEN 5 mg 2 times daily for 23 days.  Qty: 88 each, Refills: 0    Associated Diagnoses: Acute pulmonary embolism, unspecified pulmonary embolism type, unspecified whether acute cor pulmonale present (H)         CONTINUE these medications which have NOT CHANGED    Details   atorvastatin (LIPITOR) 40 MG tablet Take 1 tablet (40 mg) by mouth At Bedtime  Qty: 90 tablet, Refills: 3    Associated Diagnoses: Hyperlipidemia, unspecified hyperlipidemia type      betamethasone valerate (VALISONE) 0.1 % external cream Apply topically 2 times daily as needed (for knee pain)      Cholecalciferol (VITAMIN D3 PO) Take 400 Units by mouth daily      diphenhydrAMINE-acetaminophen (TYLENOL PM)  MG tablet Take 1 tablet by mouth nightly as needed for sleep      doxazosin (CARDURA) 4 MG tablet TAKE 1 TABLET(4 MG) BY MOUTH AT BEDTIME Strength: 4 mg  Qty: 90 tablet, Refills: 3    Associated Diagnoses:  Nocturia      metoprolol succinate ER (TOPROL XL) 50 MG 24 hr tablet Take 1 tablet (50 mg) by mouth daily  Qty: 90 tablet, Refills: 3    Associated Diagnoses: Essential hypertension, benign         STOP taking these medications       aspirin (ASA) 325 MG EC tablet Comments:   Reason for Stopping:             Allergies   No Known Allergies

## 2023-07-10 NOTE — PLAN OF CARE
Discharge instructions discussed with patient at bedside; verbalized understanding. Discharging to home with family to transport. Patient is stable at the time of discharge.

## 2023-07-10 NOTE — PROGRESS NOTES
"[facility]    Medicine Progress Note - Hospitalist Service    Date of Admission:  7/8/2023    Assessment & Plan   Sharad Yi is a 73 year old male admitted with unprovoked PE.  He is currently clinically stable, with ongoing hypoxia.  Wean oxygen as able, perform ambulatory oximetry today.    # Acute PE, unprovoked  - therapeutic enoxaparin  - anticipate lifelong anticoagulation     # BPH  - doxazosin     # HLD  - statin     # HTN  - metoprolol     # LV diastolic dysfunction  - outpatient cardiology followup       Diet: Regular Diet Adult    Tillman Catheter: Not present  Lines: None     Cardiac Monitoring: None  Code Status: Full Code      Clinically Significant Risk Factors                  # Hypertension: Noted on problem list        # Obesity: Estimated body mass index is 38.91 kg/m  as calculated from the following:    Height as of this encounter: 1.727 m (5' 8\").    Weight as of this encounter: 116.1 kg (255 lb 14.4 oz)., PRESENT ON ADMISSION          Disposition Plan      Expected Discharge Date: 07/10/2023      Destination: home  Discharge Comments: PE's, on Lovenox          Suhail May MD  Hospitalist Service  [facility]  Securely message with [employer] (more info)  Text page via [employer] Paging/Directory   ______________________________________________________________________    Interval History   Denies chest pain/pressure, SOB, or abdominal pain.  Per nusing, spo2 88-89% overnight.    Physical Exam   Vital Signs: Temp: 97.9  F (36.6  C) Temp src: Oral BP: (!) 141/93 Pulse: 81   Resp: 18 SpO2: 97 % O2 Device: Nasal cannula Oxygen Delivery: 1 LPM  Weight: 255 lbs 14.4 oz    Gen: sitting comfortably in chair, nad  Neuro: alert, conversant  CV: nl rate, regular rhythm  Pulm: no acute resp distress, ctab    Medical Decision Making             Data     "

## 2023-07-10 NOTE — PROGRESS NOTES
Patient has been assessed for Home Oxygen needs. Oxygen readings:    *Pulse oximetry (SpO2) = 98% on room air at rest while awake.    *SpO2 = 95% on room air during activity/with exercise.

## 2023-07-13 ENCOUNTER — OFFICE VISIT (OUTPATIENT)
Dept: FAMILY MEDICINE | Facility: CLINIC | Age: 73
End: 2023-07-13
Payer: COMMERCIAL

## 2023-07-13 VITALS
BODY MASS INDEX: 39.71 KG/M2 | TEMPERATURE: 97.2 F | OXYGEN SATURATION: 96 % | DIASTOLIC BLOOD PRESSURE: 78 MMHG | WEIGHT: 262 LBS | HEIGHT: 68 IN | SYSTOLIC BLOOD PRESSURE: 122 MMHG | HEART RATE: 65 BPM

## 2023-07-13 DIAGNOSIS — Z23 NEED FOR SHINGLES VACCINE: ICD-10-CM

## 2023-07-13 DIAGNOSIS — I10 BENIGN ESSENTIAL HYPERTENSION: Primary | ICD-10-CM

## 2023-07-13 DIAGNOSIS — I26.99 ACUTE PULMONARY EMBOLISM, UNSPECIFIED PULMONARY EMBOLISM TYPE, UNSPECIFIED WHETHER ACUTE COR PULMONALE PRESENT (H): ICD-10-CM

## 2023-07-13 PROBLEM — J96.01 ACUTE RESPIRATORY FAILURE WITH HYPOXIA (H): Status: RESOLVED | Noted: 2023-07-08 | Resolved: 2023-07-13

## 2023-07-13 PROCEDURE — 99213 OFFICE O/P EST LOW 20 MIN: CPT | Performed by: FAMILY MEDICINE

## 2023-07-13 NOTE — PROGRESS NOTES
Assessment & Plan     (I10) Benign essential hypertension  (primary encounter diagnosis)  Comment: Currently well controlled  Plan:        (I26.99) Acute pulmonary embolism, unspecified pulmonary embolism type, unspecified whether acute cor pulmonale present (H)  Comment: New diagnosis of a bilaterally pulmonary embolism  Plan:      PLAN:  1.  Patient has already started and will continue Eliquis, its been recommended to have indefinite anticoagulation.  2.  Recommend the patient have a Medicare wellness exam with his primary care provider this fall.       See Patient Instructions    Ilia Horner MD  [facility] SALAS Quezada is a 73 year old, presenting for the following health issues:  Patient comes in for follow-up hospitalization.  Patient was hospitalized July 8 through July 10, he presented with shortness of breath, he was diagnosed with bilateral pulmonary emboli started on Eliquis.    The patient mentions that for a number of months he has been getting short of breath, with any significant level of exertion or activity initially he thought that this was because he was deconditioned after he had hip surgery he was not very active, however when he was seen in the emergency room he was diagnosed with bilateral pulmonary emboli started on Eliquis and he reports that he has noticed significant improvement in his overall breathing, as an example he played golf yesterday and reports that he was able to walk and do much better than he has.    Patient was instructed that he is going to need to be on lifelong anticoagulation, he is aware of bleeding risks.  There was no other change in his usual level of activity prior to him being diagnosed    It was mentioned in the discharge summary to consider a sleep study of note patient had 1 decades ago, he was not diagnosed with anything, he reports in the hospital he was not sleeping well but since he has been home he is sleeping  "very well he is snoring at night or has been told that but he does not have significant daytime fatigue he is actually not worried about sleep apnea or any sleep issues and so we will defer any work-up for this.    Gallstones were mentioned on the most recent CT scan, patient is asymptomatic with this.    Otherwise the patient's blood pressure is well controlled other comorbidities have been well controlled.      MED REC REQUIRED  Post Medication Reconciliation Status: discharge medications reconciled, continue medications without change      Hospital F/U (7/8/2023/Clots in lungs)        7/13/2023    10:12 AM   Additional Questions   Roomed by Yvonne DENIS     HPI             Review of Systems   Constitutional, HEENT, cardiovascular, pulmonary, gi and gu systems are negative, except as otherwise noted.      Objective    /78   Pulse 65   Temp 97.2  F (36.2  C) (Temporal)   Ht 1.727 m (5' 8\")   Wt 118.8 kg (262 lb)   SpO2 96%   BMI 39.84 kg/m    Body mass index is 39.84 kg/m .  Physical Exam   GENERAL: healthy, alert and no distress  EYES: Eyes grossly normal to inspection, PERRL and conjunctivae and sclerae normal  HENT: ear canals and TM's normal, nose and mouth without ulcers or lesions  MS: no gross musculoskeletal defects noted, no edema  SKIN: no suspicious lesions or rashes  NEURO: Normal strength and tone, mentation intact and speech normal  PSYCH: mentation appears normal, affect normal/bright                    "

## 2023-07-18 PROBLEM — J96.01 ACUTE RESPIRATORY FAILURE WITH HYPOXIA (H): Status: ACTIVE | Noted: 2023-07-18

## 2023-09-08 ENCOUNTER — MYC MEDICAL ADVICE (OUTPATIENT)
Dept: FAMILY MEDICINE | Facility: CLINIC | Age: 73
End: 2023-09-08
Payer: COMMERCIAL

## 2023-09-08 DIAGNOSIS — I26.99 ACUTE PULMONARY EMBOLISM, UNSPECIFIED PULMONARY EMBOLISM TYPE, UNSPECIFIED WHETHER ACUTE COR PULMONALE PRESENT (H): ICD-10-CM

## 2023-09-08 NOTE — TELEPHONE ENCOUNTER
Routing refill request to provider for review/approval because:  Anticoagulant requires review    Last Written Prescription Date:  8/9/23  Last Fill Quantity: 60,  # refills: 0   Last office visit provider:  7/13/23     Requested Prescriptions   Pending Prescriptions Disp Refills    apixaban ANTICOAGULANT (ELIQUIS) 5 MG tablet 60 tablet 0     Sig: Take 1 tablet (5 mg) by mouth 2 times daily       Direct Oral Anticoagulant Agents Passed - 9/8/2023  1:17 PM        Passed - Normal Platelets on file in past 12 months     Recent Labs   Lab Test 07/09/23  0407                  Passed - Medication is active on med list        Passed - Patient is 18-79 years of age        Passed - Serum creatinine less than or equal to 1.4 on file in past 12 mos     Recent Labs   Lab Test 07/09/23 0407   CR 0.86       Ok to refill medication if creatinine is low          Passed - Weight is greater than 60 kg for the past year     Wt Readings from Last 3 Encounters:   07/13/23 118.8 kg (262 lb)   07/10/23 116.1 kg (255 lb 14.4 oz)   12/08/22 116.6 kg (257 lb)             Passed - Recent (6 mo) or future (30 days) visit within the authorizing provider's specialty             Kimberly Saldivar RN 09/08/23 1:18 PM

## 2023-09-26 ENCOUNTER — TELEPHONE (OUTPATIENT)
Dept: FAMILY MEDICINE | Facility: CLINIC | Age: 73
End: 2023-09-26
Payer: COMMERCIAL

## 2023-09-26 NOTE — TELEPHONE ENCOUNTER
Calling: Alex from [facility] [phone] secure nurse line for verbal orders     Procedure Date: 10/18 Colonoscopy    Requesting hold and bridge orders: 2 day hold of Eliquis    If patient is not able to hold Eliquis they would request a creatinine lab, within 90 days if procedure date    LIZ Meyer  [facility]

## 2023-09-26 NOTE — TELEPHONE ENCOUNTER
HIEU-PROCEDURAL ANTICOAGULATION  MANAGEMENT    SUBJECTIVE/OBJECTIVE     Sharad Yi, a 73 year old male on Apixaban (Eliquis), with planned Colonoscopy  on 10/18/23.    Indication for Anticoagulation: PE (7/8/23)     Wt Readings from Last 2 Encounters:   07/13/23 118.8 kg (262 lb)   07/10/23 116.1 kg (255 lb 14.4 oz)      Lab Results   Component Value Date    CR 0.86 07/09/2023    CR 0.98 07/08/2023     Estimated Creatinine Clearance: 94.4 mL/min (based on SCr of 0.86 mg/dL).    ASSESSMENT/RECOMMENDATION     DOAC hold duration is bleeding risk of the procedure, renal function of the patient.     2022 Chest Perioperative Management of Antithrombotic Therapy guidelines suggest against perioperative bridging in DOAC patients; considering their rapid offset and rapid onset of action.  At the time of Colonoscopy procedure patients recent PE will be greater than 3 months. The acute treatment phase considered complete and the patient remains on extended anticoagulation. Thus, it is reasonable to hold Apixaban for standard duration without bridge.       Pre-Procedure:  Hold apixaban (Eliquis) 48 hours prior to procedure for CrCl >= 30  ml/min and low bleeding risk procedure. Take last dose on 10/16/23  No Bridge    Post-Procedure:  Resume therapeutic Apixaban 5 mg BID if okay with provider ~ 24 hours post procedure after normal exam or 48 hours if polyps are removed per bleeding risk of the procedure    Plan routed to referring provider for approval  ?   Rikki Horta

## 2023-09-27 NOTE — TELEPHONE ENCOUNTER
"Spoke with LIZ Alan at [facility].  Advised of order from Dr. Perez, \"ok to hold (Eliquis) for 2 days for procedure then resume.   Dayanna RN will be contacting patient to advise of Eliquis and to assist patient with any further questions regarding procedure.   "

## 2023-10-12 ENCOUNTER — TRANSFERRED RECORDS (OUTPATIENT)
Dept: MULTI SPECIALTY CLINIC | Facility: CLINIC | Age: 73
End: 2023-10-12

## 2023-10-26 ENCOUNTER — TRANSFERRED RECORDS (OUTPATIENT)
Dept: HEALTH INFORMATION MANAGEMENT | Facility: CLINIC | Age: 73
End: 2023-10-26
Payer: COMMERCIAL

## 2023-12-05 ENCOUNTER — OFFICE VISIT (OUTPATIENT)
Dept: FAMILY MEDICINE | Facility: CLINIC | Age: 73
End: 2023-12-05
Payer: COMMERCIAL

## 2023-12-05 ENCOUNTER — PATIENT OUTREACH (OUTPATIENT)
Dept: CARE COORDINATION | Facility: CLINIC | Age: 73
End: 2023-12-05

## 2023-12-05 VITALS
OXYGEN SATURATION: 96 % | TEMPERATURE: 97.7 F | WEIGHT: 268 LBS | DIASTOLIC BLOOD PRESSURE: 91 MMHG | SYSTOLIC BLOOD PRESSURE: 134 MMHG | BODY MASS INDEX: 39.69 KG/M2 | HEART RATE: 69 BPM | HEIGHT: 69 IN | RESPIRATION RATE: 14 BRPM

## 2023-12-05 DIAGNOSIS — R06.83 SNORING: ICD-10-CM

## 2023-12-05 DIAGNOSIS — E66.01 MORBID OBESITY (H): ICD-10-CM

## 2023-12-05 DIAGNOSIS — K76.0 STEATOSIS OF LIVER: ICD-10-CM

## 2023-12-05 DIAGNOSIS — Z86.711 HISTORY OF PULMONARY EMBOLISM: ICD-10-CM

## 2023-12-05 DIAGNOSIS — Z86.0100 HISTORY OF COLONIC POLYPS: ICD-10-CM

## 2023-12-05 DIAGNOSIS — Z00.00 MEDICARE ANNUAL WELLNESS VISIT, SUBSEQUENT: Primary | ICD-10-CM

## 2023-12-05 DIAGNOSIS — E78.2 MIXED HYPERLIPIDEMIA: ICD-10-CM

## 2023-12-05 DIAGNOSIS — Z29.11 NEED FOR VACCINATION AGAINST RESPIRATORY SYNCYTIAL VIRUS: ICD-10-CM

## 2023-12-05 DIAGNOSIS — Z80.42 FAMILY HISTORY OF PROSTATE CANCER IN FATHER: ICD-10-CM

## 2023-12-05 DIAGNOSIS — I10 BENIGN ESSENTIAL HYPERTENSION: ICD-10-CM

## 2023-12-05 DIAGNOSIS — N40.0 ENLARGED PROSTATE: ICD-10-CM

## 2023-12-05 DIAGNOSIS — E78.5 HYPERLIPIDEMIA, UNSPECIFIED HYPERLIPIDEMIA TYPE: ICD-10-CM

## 2023-12-05 DIAGNOSIS — Z12.5 SCREENING FOR PROSTATE CANCER: ICD-10-CM

## 2023-12-05 DIAGNOSIS — Z87.448 HISTORY OF HEMATURIA: ICD-10-CM

## 2023-12-05 LAB
CHOLEST SERPL-MCNC: 191 MG/DL
HDLC SERPL-MCNC: 52 MG/DL
LDLC SERPL CALC-MCNC: 115 MG/DL
NONHDLC SERPL-MCNC: 139 MG/DL
PSA SERPL DL<=0.01 NG/ML-MCNC: 4.66 NG/ML (ref 0–6.5)
TRIGL SERPL-MCNC: 122 MG/DL

## 2023-12-05 PROCEDURE — 99214 OFFICE O/P EST MOD 30 MIN: CPT | Mod: 25 | Performed by: NURSE PRACTITIONER

## 2023-12-05 PROCEDURE — G0439 PPPS, SUBSEQ VISIT: HCPCS | Performed by: NURSE PRACTITIONER

## 2023-12-05 PROCEDURE — 80061 LIPID PANEL: CPT | Performed by: NURSE PRACTITIONER

## 2023-12-05 PROCEDURE — 36415 COLL VENOUS BLD VENIPUNCTURE: CPT | Performed by: NURSE PRACTITIONER

## 2023-12-05 PROCEDURE — G0103 PSA SCREENING: HCPCS | Performed by: NURSE PRACTITIONER

## 2023-12-05 RX ORDER — RESPIRATORY SYNCYTIAL VIRUS VACCINE 120MCG/0.5
0.5 KIT INTRAMUSCULAR ONCE
Qty: 1 EACH | Refills: 0 | Status: CANCELLED | OUTPATIENT
Start: 2023-12-05 | End: 2023-12-05

## 2023-12-05 ASSESSMENT — ENCOUNTER SYMPTOMS
EYE PAIN: 0
FEVER: 0
DIARRHEA: 0
DIZZINESS: 0
NERVOUS/ANXIOUS: 0
CONSTIPATION: 0
HEADACHES: 0
HEMATOCHEZIA: 0
MYALGIAS: 0
NAUSEA: 0
SHORTNESS OF BREATH: 0
JOINT SWELLING: 0
HEARTBURN: 0
CHILLS: 0
ABDOMINAL PAIN: 0
PALPITATIONS: 0
ARTHRALGIAS: 1
SORE THROAT: 0
DYSURIA: 0
FREQUENCY: 1
PARESTHESIAS: 0
WEAKNESS: 0
HEMATURIA: 1
COUGH: 0

## 2023-12-05 ASSESSMENT — ACTIVITIES OF DAILY LIVING (ADL): CURRENT_FUNCTION: NO ASSISTANCE NEEDED

## 2023-12-05 NOTE — PATIENT INSTRUCTIONS
"  Consider keeping a food diary (i.e. My Fitness Pal, Lose It, or other food tracker). Try to use it 24 hours, every day for at least one week. This will help with accountability.  Start Weighing yourself every day, this will aide in keeping you accountable. If it makes you anxious, you can skip this step.      Nutrition Goals  1) Follow 7165-7415 calorie/day plan; can use [employer] shante to help with diary.               -www.eatthismuch.com     2) 9\" Plate method (1/2 plate non-starchy vegetables/fruit, 1/4 plate lean protein, 1/4 plate whole grain starch - no more than 1/2 cup carb/meal).     3).Eat 3 meals a day (not 2, not 5) Chew your food well/SLOW down    4) Eat your protein first, this will aide in filling you up. Eat Wheat, not white (bread, pasta, crackers, alex, bagels, tortillas, rice)  ---Can aim for 80 grams of lean protein daily should be effective for you and allow you to have good control over appetite if you start getting a protein rich meal with 25-30 grams of protein every 4.5-6 hours through the day. This protein anchor to the meal should be bulked up with some good vegetables/greens for the fiber/crunch/chew and benefit on your blood pressure.  Get the protein portion of your meal at the beginning of the meal as your appetite, especially if you are on appetite suppressant/diabetes medication. Appetite suppressants can cause fullness and we want to make sure you get at least 25 grams of protein at each of your 3 meals daily to support good metabolism and lean muscle mass.      5). Be a water drinker/Minize liquid calories (no regular pop, no juice) skim or 1% milk OK  6) Sleep 7-8 hours each night. Address sleep if problematic  7) Stress management is important. Address if problematic  8) Limit restaurant, cafeteria, take out, drive through to 2 times per week or less  9) Minimize caffeine, alcohol, and night-time snacking    -Follow up with the dietitian    **Some lean proteins: chicken, " turkey, tuna, salmon, crab, fish, shrimp, scallops, lobster, lean cuts of beef and pork, luncheon meats, veggie burgers, beans (black, lima, garbanzo, angel, kidney, refried), chile, cottage cheese, string cheese, other cheese, eggs, tofu, peanut butter, nuts, vegan crumbles, greek yogurt      --- Continue physical activity; can start with 10 minutes per day like going for a walk after dinner or at your lunchtime. Goal is 8000 steps daily. For the Muscle: maintain your muscle mass (strength training 2X/wk)    Meal prep tips: https://www.inmobly.Gaudena/healthyeats/healthy-tips/2019/10/meal-prep-tips-jamar-experts    Avoid snacking as able. If snack is needed use lean protein and/or fruit/vegetable. Examples:              - 2 cup popcorn              - 1 cup mixed berries              - 15 almonds, walnuts, cashews              - carrot/celery sticks and 2 tbsp low-fat ranch              - 1 hard boiled egg              - Part-skim mozzarella cheese stick              - Low-fat, low-sugar greek yogurt with 1/2 cup berries              - Med apple or pear              - sliced bell peppers with 1/2 cup salsa              - 1/2 cup roasted chickpeas              - sliced cucumbers with vinegar     100 calorie sweets: Smart Sweets, Fiber One desserts, Fit and Active 100 calorie snack sweets at [employer]; [employer] 100 calorie pre-portioned cookies, sugar-free pudding, sugar-free jello.     Snack Recipes:  - Banana and creamy PB dip (https://www.diabetesfoodhub.org/recipes/sweet-peanut-buttery-dip.html?home-category_id=23)  - Roasted chickpeas (https://www.diabetesfoodhub.org/recipes/roasted-and-spiced-chickpeas.html?home-category_id=23)  - Lemon Raspberry nadi seed pudding (https://www.diabetesfoodhub.org/recipes/lemon-raspberry- andi-seed-pudding.html?home-category_id=23)  - Black bean hummus with carrot and celery sticks (https://www.diabetesfoodhub.org/recipes/black-bean-hummus.html?home-category_id=23)  - Greek yogurt  "chocolate mouse (https://www.diabetesfoodhub.org/recipes/greek-yogurt-chocolate-mousse.html?home-category_id=23)   - Broccoli Cheese Bites  (https://www.diabetesfoodhub.org/recipes/broccoli-cheese-bites.html?home-category_id=20)  - Chicken Satay with peanut sauce  (https://www.diabetesfoodhub.org/recipes/blueberry-almond-chicken-salad-lettuce-wraps.html?home-category_id=20)  - Deviled Eggs  (https://www.diabetesfoodhub.org/recipes/devilled-eggs.html?home-category_id=20)     Healthy Recipe Resources:  Books:  \"The Volumetrics Eating Plan\" by Abimbola Lafleur, Ph.D.  \"Cooking that Counts\" by editors of [employer]  \"Calorie Smart Meals\" cookbook by [employer] (200-500 calorie meals)     Websites:  www.OneTwoSee  www.Vital Vio  https://www.diabetesfoodhub.org/all-recipes.html  https://www.[employer].Etalia/  Https://www.TeraDiodemyplate.gov/myplatekitchen  https://snaped.fns.usda.gov/recipes-menus   https://www.[employer]/gallery/1082865/dietitian-budget-high-protein-dinner-recipes/  https://www.[employer].Etalia/recipes/84/healthy-recipes/         Cultural Cuisines:  https://www.[employer].Etalia/recipes/10454/cuisines-regions//  https://www.firstnations.org/knowledge-center/recipes/  https://Hubkick.Etalia/recipe-index/     Apps:  MealKindermint shante (or website, mealime.com)   Tio         The Plate Method  Http://www.BestVendor/820263.pdf     Protein Sources   http://BestVendor/094319.pdf      Carbohydrates  http://fvfiles.com/465858.pdf      Mindful Eating  http://BestVendor/707473.pdf      Summary of Volumetrics Eating Plan  http://fvfiles.com/396788.pdf     "

## 2023-12-05 NOTE — PROGRESS NOTES
"SUBJECTIVE:   Damien is a 73 year old, presenting for the following:  Physical (Welcome to medicare visit)  = he overall feels well. He has been placed on long term blood thinners after he developed a blood clot in his bilateral lungs. He had dizziness, shortness of breath, lightheadedness with exertion. His O2 sats were 77 percent with walking 88% at rest when he arrived to the ED. This was his first blood clot, his brother also has a history of blood clots. The reason is unknown. A sleep study was suggested but he never did it. He does snore at night but does not recall gasping or choking for air or witness apnea. He denied any heart palpations or fluttering. He denied any on-going dizziness or swelling in his legs. He does enjoy a few beers during the week, but thinking about cutting back. He had hematuria this past summer. He has a Ho elevated PSA readings and had a Prostate biopsy many years ago.         12/5/2023     9:22 AM   Additional Questions   Roomed by Terry       Are you in the first 12 months of your Medicare coverage?  Yes,  Visual Acuity:  Right Eye: 20/30   Left Eye: 20/30  Both Eyes: 20/30    Healthy Habits:     In general, how would you rate your overall health?  Good    Frequency of exercise:  2-3 days/week    Duration of exercise:  15-30 minutes    Do you usually eat at least 4 servings of fruit and vegetables a day, include whole grains    & fiber and avoid regularly eating high fat or \"junk\" foods?  No    Taking medications regularly:  Yes    Medication side effects:  None    Ability to successfully perform activities of daily living:  No assistance needed    Home Safety:  No safety concerns identified    Hearing Impairment:  Difficulty following a conversation in a noisy restaurant or crowded room and difficult to understand a speaker at a public meeting or [employer] service    In the past 6 months, have you been bothered by leaking of urine? Yes    In general, how would you rate your overall " mental or emotional health?  Excellent    Additional concerns today:  Yes      Today's PHQ-2 Score:       12/5/2023     8:53 AM   PHQ-2 ( 1999 [employer])   Q1: Little interest or pleasure in doing things 0   Q2: Feeling down, depressed or hopeless 0   PHQ-2 Score 0   Q1: Little interest or pleasure in doing things Not at all   Q2: Feeling down, depressed or hopeless Not at all   PHQ-2 Score 0     Have you ever done Advance Care Planning? (For example, a Health Directive, POLST, or a discussion with a medical provider or your loved ones about your wishes): Yes, patient states has an Advance Care Planning document and will bring a copy to the clinic.       Fall risk  Fallen 2 or more times in the past year?: No  Any fall with injury in the past year?: No  click delete button to remove this line now  Cognitive Screening   1) Repeat 3 items (Leader, Season, Table)    2) Clock draw: NORMAL  3) 3 item recall: Recalls 3 objects  Results: 3 items recalled: COGNITIVE IMPAIRMENT LESS LIKELY    Mini-CogTM Copyright ASHLEY Hall. Licensed by the author for use in [facility]; reprinted with permission (soob@[facility]). All rights reserved.      Do you have sleep apnea, excessive snoring or daytime drowsiness? : yes    Reviewed and updated as needed this visit by clinical staff   Tobacco  Allergies  Meds              Reviewed and updated as needed this visit by Provider                 Social History     Tobacco Use    Smoking status: Former     Passive exposure: Past    Smokeless tobacco: Never    Tobacco comments:     In college   Substance Use Topics    Alcohol use: Yes     Alcohol/week: 10.0 standard drinks of alcohol     Types: 10 Standard drinks or equivalent per week             12/5/2023     8:53 AM   Alcohol Use   Prescreen: >3 drinks/day or >7 drinks/week? No         10/5/2021     8:53 AM   AUDIT - Alcohol Use Disorders Identification Test - Reproduced from the World Health Organization Audit 2001 (Second  Edition)   1.  How often do you have a drink containing alcohol? 2 to 3 times a week   2.  How many drinks containing alcohol do you have on a typical day when you are drinking? 1 or 2   3.  How often do you have five or more drinks on one occasion? Never   4.  How often during the last year have you found that you were not able to stop drinking once you had started? Never   5.  How often during the last year have you failed to do what was normally expected of you because of drinking? Never   6.  How often during the last year have you needed a first drink in the morning to get yourself going after a heavy drinking session? Never   7.  How often during the last year have you had a feeling of guilt or remorse after drinking? Never   8.  How often during the last year have you been unable to remember what happened the night before because of your drinking? Never   9.  Have you or someone else been injured because of your drinking? No   10. Has a relative, friend, doctor or other health care worker been concerned about your drinking or suggested you cut down? No   TOTAL SCORE 3     Do you have a current opioid prescription? No  Do you use any other controlled substances or medications that are not prescribed by a provider? None          Chief Complaint   Patient presents with    Physical     Welcome to medicare visit        Current providers sharing in care for this patient include: Patient Care Team:  Leonardo Perez MD as PCP - General (Internal Medicine - Pediatrics)  Alejandro Baumann DO as Assigned Musculoskeletal Provider  Leonardo Perez MD as Assigned PCP    The following health maintenance items are reviewed in Epic and correct as of today:  Health Maintenance   Topic Date Due    RSV VACCINE (Pregnancy & 60+) (1 - 1-dose 60+ series) Never done    ZOSTER IMMUNIZATION (2 of 3) 04/14/2014    INFLUENZA VACCINE (1) 09/01/2023    COVID-19 Vaccine (5 - 2023-24 season) 09/01/2023    MEDICARE ANNUAL  "WELLNESS VISIT  12/08/2023    ANNUAL REVIEW OF HM ORDERS  12/08/2023    COLORECTAL CANCER SCREENING  04/25/2024    FALL RISK ASSESSMENT  12/05/2024    LIPID  10/05/2026    ADVANCE CARE PLANNING  12/08/2027    DTAP/TDAP/TD IMMUNIZATION (4 - Td or Tdap) 03/25/2029    PHQ-2 (once per calendar year)  Completed    Pneumococcal Vaccine: 65+ Years  Completed    AORTIC ANEURYSM SCREENING (SYSTEM ASSIGNED)  Completed    IPV IMMUNIZATION  Aged Out    HPV IMMUNIZATION  Aged Out    MENINGITIS IMMUNIZATION  Aged Out    RSV MONOCLONAL ANTIBODY  Aged Out    HEPATITIS C SCREENING  Discontinued    LUNG CANCER SCREENING  Discontinued     Lab work is in process  Labs reviewed in EPIC  BP Readings from Last 3 Encounters:   12/05/23 (!) 134/91   07/13/23 122/78   07/10/23 133/89    Wt Readings from Last 3 Encounters:   12/05/23 121.6 kg (268 lb)   07/13/23 118.8 kg (262 lb)   07/10/23 116.1 kg (255 lb 14.4 oz)               Review of Systems   Constitutional:  Negative for chills and fever.   HENT:  Positive for hearing loss. Negative for congestion, ear pain and sore throat.    Eyes:  Positive for visual disturbance. Negative for pain.   Respiratory:  Negative for cough and shortness of breath.    Cardiovascular:  Negative for chest pain, palpitations and peripheral edema.   Gastrointestinal:  Negative for abdominal pain, constipation, diarrhea, heartburn, hematochezia and nausea.   Genitourinary:  Positive for frequency, hematuria and urgency. Negative for dysuria, genital sores and impotence.   Musculoskeletal:  Positive for arthralgias. Negative for joint swelling and myalgias.   Skin:  Negative for rash.   Neurological:  Negative for dizziness, weakness, headaches and paresthesias.   Psychiatric/Behavioral:  Negative for mood changes. The patient is not nervous/anxious.        OBJECTIVE:   BP (!) 134/91 (Patient Position: Left side)   Pulse 69   Temp 97.7  F (36.5  C) (Oral)   Resp 14   Ht 1.753 m (5' 9\")   Wt 121.6 kg (268 " "lb)   SpO2 96%   BMI 39.58 kg/m   Estimated body mass index is 39.84 kg/m  as calculated from the following:    Height as of 7/13/23: 1.727 m (5' 8\").    Weight as of 7/13/23: 118.8 kg (262 lb).  Physical Exam  GENERAL: healthy, alert and no distress  NECK: no adenopathy, no asymmetry, masses, or scars and thyroid normal to palpation  RESP: lungs clear to auscultation - no rales, rhonchi or wheezes  CV: regular rate and rhythm, normal S1 S2, no S3 or S4, no murmur, click or rub, no peripheral edema and peripheral pulses strong  ABDOMEN: soft, nontender, no hepatosplenomegaly, no masses and bowel sounds normal  MS: no gross musculoskeletal defects noted, no edema    Diagnostic Test Results:  Labs reviewed in Epic  Last Comprehensive Metabolic Panel:  Sodium   Date Value Ref Range Status   07/09/2023 141 136 - 145 mmol/L Final     Potassium   Date Value Ref Range Status   07/09/2023 4.4 3.5 - 5.0 mmol/L Final     Chloride   Date Value Ref Range Status   07/09/2023 106 98 - 107 mmol/L Final     Carbon Dioxide (CO2)   Date Value Ref Range Status   07/09/2023 24 22 - 31 mmol/L Final     Anion Gap   Date Value Ref Range Status   07/09/2023 11 5 - 18 mmol/L Final     Glucose   Date Value Ref Range Status   07/09/2023 103 70 - 125 mg/dL Final     Urea Nitrogen   Date Value Ref Range Status   07/09/2023 13 8 - 28 mg/dL Final     Creatinine   Date Value Ref Range Status   07/09/2023 0.86 0.70 - 1.30 mg/dL Final     GFR Estimate   Date Value Ref Range Status   07/09/2023 >90 >60 mL/min/1.73m2 Final   09/18/2020 >60 >60 mL/min/1.73m2 Final     Calcium   Date Value Ref Range Status   07/09/2023 9.1 8.5 - 10.5 mg/dL Final     Bilirubin Total   Date Value Ref Range Status   07/08/2023 0.9 0.0 - 1.0 mg/dL Final     Alkaline Phosphatase   Date Value Ref Range Status   07/08/2023 64 45 - 120 U/L Final     ALT   Date Value Ref Range Status   07/08/2023 23 0 - 45 U/L Final     AST   Date Value Ref Range Status   07/08/2023 18 0 - 40 " U/L Final     ASSESSMENT / PLAN:       ICD-10-CM    1. Visit for preventive health examination  Z00.00       2. Mixed hyperlipidemia  E78.2 Lipid Profile (Chol, Trig, HDL, LDL calc)     Lipid Profile (Chol, Trig, HDL, LDL calc)      3. Morbid obesity (H)  E66.01 Adult Sleep Eval & Management  Referral      4. Benign essential hypertension  I10 Adult Sleep Eval & Management  Referral      5. Screening for prostate cancer  Z12.5 PSA, screen     PSA, screen      6. Need for vaccination against respiratory syncytial virus  Z29.11       7. Snoring  R06.83 Adult Sleep Eval & Management  Referral      8. History of pulmonary embolism  Z86.711 Adult Oncology/Hematology  Referral      9. Steatosis of liver  K76.0       10. History of colonic polyps  Z86.010       11. Family history of prostate cancer in father  Z80.42       12. Enlarged prostate  N40.0       13. History of hematuria  Z87.448       14. Hyperlipidemia, unspecified hyperlipidemia type  E78.5 atorvastatin (LIPITOR) 80 MG tablet        -Your cholesterol is stable, but your LDL is more elevated.  Your 10 year ASCVD risk is also elevated at 27%. Please increased atorvastatin to 80 mg per day, and try to work on lessening saturated fats in your diet.  Please continue to monitor this at least every year.     Your PSA level is better than it was last year.  It is still on the upper end of normal, I suggest continue to work with your PCP on monitoring this every year.    See Hematology for additional evaluation into your blood clot from last year.      Per chart review, it appears that you had a history of blood in your urine.  Did you ever get this looked at by urology?  If you are still noticing blood in the urine, please follow-up with your PCP and get seen by urology.     If you have any questions please let your PCP know, or you certainly can ask me too!     The 10-year ASCVD risk score (Yeny MANJARREZ, et al., 2019) is: 27.9%     "Values used to calculate the score:      Age: 73 years      Sex: Male      Is Non- : No      Diabetic: No      Tobacco smoker: No      Systolic Blood Pressure: 134 mmHg      Is BP treated: Yes      HDL Cholesterol: 52 mg/dL      Total Cholesterol: 191 mg/dL      Patient has been advised of split billing requirements and indicates understanding: Yes      COUNSELING:  Reviewed preventive health counseling, as reflected in patient instructions      BMI:   Estimated body mass index is 39.84 kg/m  as calculated from the following:    Height as of 7/13/23: 1.727 m (5' 8\").    Weight as of 7/13/23: 118.8 kg (262 lb).   Weight management plan: Discussed healthy diet and exercise guidelines      He reports that he has quit smoking. He has been exposed to tobacco smoke. He has never used smokeless tobacco.      Appropriate preventive services were discussed with this patient, including applicable screening as appropriate for fall prevention, nutrition, physical activity, Tobacco-use cessation, weight loss and cognition.  Checklist reviewing preventive services available has been given to the patient.    Reviewed patients plan of care and provided an AVS. The Basic Care Plan (routine screening as documented in Health Maintenance) for Sharad meets the Care Plan requirement. This Care Plan has been established and reviewed with the Patient.          CHOLO Gasca CNP  [facility]    Identified Health Risks:  I have reviewed Opioid Use Disorder and Substance Use Disorder risk factors and made any needed referrals.   "

## 2023-12-07 PROBLEM — K76.0 NONALCOHOLIC FATTY LIVER: Status: ACTIVE | Noted: 2019-09-18

## 2023-12-07 PROBLEM — Z86.0100 HISTORY OF COLONIC POLYPS: Status: ACTIVE | Noted: 2023-10-20

## 2023-12-07 PROBLEM — Z80.42 FAMILY HISTORY OF PROSTATE CANCER IN FATHER: Status: ACTIVE | Noted: 2023-12-07

## 2023-12-07 PROBLEM — N40.0 ENLARGED PROSTATE: Status: ACTIVE | Noted: 2023-12-07

## 2023-12-07 PROBLEM — Z86.711 HISTORY OF PULMONARY EMBOLISM: Status: ACTIVE | Noted: 2023-12-07

## 2023-12-07 PROBLEM — K76.0 STEATOSIS OF LIVER: Status: ACTIVE | Noted: 2023-12-07

## 2023-12-07 PROBLEM — J96.01 ACUTE RESPIRATORY FAILURE WITH HYPOXIA (H): Status: RESOLVED | Noted: 2023-07-18 | Resolved: 2023-12-07

## 2023-12-07 PROBLEM — K64.9 HEMORRHOIDS: Status: ACTIVE | Noted: 2019-04-25

## 2023-12-19 ENCOUNTER — PATIENT OUTREACH (OUTPATIENT)
Dept: CARE COORDINATION | Facility: CLINIC | Age: 73
End: 2023-12-19
Payer: COMMERCIAL

## 2023-12-22 NOTE — RESULT ENCOUNTER NOTE
Kuldeep Quezada    I am sorry it took me a couple weeks to get back to you about your labs.     Your cholesterol is stable, please continue atorvastatin, and try to work on lessening saturated fats in your diet.  Please continue to monitor this at least every year.     Your PSA level is better than it was last year.  It is still on the upper end of normal, I suggest continue to work with your PCP on monitoring this every year.     Per chart review, it appears that you had a history of blood in your urine.  Did you ever get this looked at by urology?  If you are still noticing blood in the urine, please follow-up with your PCP and get seen by urology.     If you have any questions please let your PCP know, or you certainly can ask me too!    Deyanira

## 2023-12-25 RX ORDER — ATORVASTATIN CALCIUM 80 MG/1
80 TABLET, FILM COATED ORAL AT BEDTIME
Qty: 90 TABLET | Refills: 3 | Status: SHIPPED | OUTPATIENT
Start: 2023-12-25

## 2024-01-03 NOTE — PROGRESS NOTES
[facility]  [address] [place] [address] [address], [place] [address]  Main: [phone], Fax: [phone]    Patient seen at: [facility] at [address] [place] [address]    Outpatient Visit Note:    Patient: Sharad Yi  MRN: [id number]  : 1950  CHAKA: 2024    Reason for visit:  Bilateral pulmonary embolism and DVT of right peroneal vein as well as superficial thrombophlebitis in the right lesser saphenous vein back on 2023.     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     Dated back to 2023, he was walking to his car and developed shortness of breath and associated dizziness and diaphoresis. He called 911 and eventually brought to the emergency department for evaluation. At the time, he reports that he has had progressively worsening dyspnea for the past year with significant progression for the few weeks prior to 2023. At the time, he reports no prior surgery since 2022 (right total hip replacement), no long distance travelling, no traumatic injuries, and no hospitalization. D-Dimer was elevated at 7.34. CTA chest at the time showed: Prominent bilateral pulmonary embolism burden leading to the distal portions of the right and left main pulmonary arteries. Mildly dilated main pulmonary artery root. Correlate with any evidence of cardiac strain. Mild patchy groundglass opacitites in both lungs may be mild edema and small cholelithiasis. Bilateral lower extremity venous ultrasound showed some moderate superficial thrombophlebitis in the lesser saphenous vein and mild acute DVT in the right peroneal vein of the calf. He eventually was discharged on 7/10/2023 on  apixaban.     He had a visit with his primary care provider, Rae Phillip CNP on 2023 and is referred to our clinic for consultation.     Colonoscopy 2 months ago with no significant findings.     Currently he is on apixaban at 5 mg PO BID dosing. He denies any bleeding complications.     He reports today that he continues to have some shortness of breath on exertion. His symptoms has been ongoing even before he was found to have his pulmonary embolism in 2023 for the past 1-2 years.     ROS:  Denies any bleeding complications. Specifically, no frequent epistaxis. No issues with oral mucosal bleeding. Denies any hematuria or blood in stools. Denies any shortness of breath. No chest pain. No cough. No fever.    Medications:  Current Outpatient Medications   Medication    apixaban ANTICOAGULANT (ELIQUIS) 5 MG tablet    atorvastatin (LIPITOR) 80 MG tablet    betamethasone valerate (VALISONE) 0.1 % external cream    Cholecalciferol (VITAMIN D3 PO)    diphenhydrAMINE-acetaminophen (TYLENOL PM)  MG tablet    doxazosin (CARDURA) 4 MG tablet    metoprolol succinate ER (TOPROL XL) 50 MG 24 hr tablet     Current Facility-Administered Medications   Medication    betamethasone acet & sod phos (CELESTONE) injection 6 mg     Allergies:   No Known Allergies    PmHx:  Past Medical History:   Diagnosis Date    BPH (benign prostatic hyperplasia)     Chronic knee pain      Social History:   Deferred    Family History:  Damien has a total of 7 siblings. One of his younger sisters has a history of lower extremity DVT. Unclear of details. However, his other 6 siblings has had no history of DVT/PE.   His father  of a heart attack at 57.  Mother is still living at 98 years of age with no history of venous thromboembolism.   He has one son in his 40's with no history of venous thromboembolism.  He also has 2 grandchildren with no history of venous thromboembolism.     Objective:  Vitals: BP (!) 147/89 (BP Location: Right  arm, Patient Position: Sitting, Cuff Size: Adult Large)   Pulse 78   Temp (!) 95.8  F (35.4  C) (Tympanic)   Wt 122 kg (269 lb)   SpO2 100%   BMI 39.72 kg/m    Exam:   Complete exam is not performed today.       Labs:  Component      Latest Ref Rng 7/8/2023  10:03 AM 7/9/2023  4:07 AM   Sodium      136 - 145 mmol/L 139     Potassium      3.5 - 5.0 mmol/L 4.2     Chloride      98 - 107 mmol/L 107     Carbon Dioxide      22 - 31 mmol/L 22     Anion Gap      5 - 18 mmol/L 10     Urea Nitrogen      8 - 28 mg/dL 19     Creatinine      0.70 - 1.30 mg/dL 0.98     Calcium      8.5 - 10.5 mg/dL 9.0     Glucose      70 - 125 mg/dL 116     Alkaline Phosphatase      45 - 120 U/L 64     AST      0 - 40 U/L 18     ALT      0 - 45 U/L 23     Protein Total      6.0 - 8.0 g/dL 6.9     Albumin      3.5 - 5.0 g/dL 3.6     Bilirubin Total      0.0 - 1.0 mg/dL 0.9     GFR Estimate      >60 mL/min/1.73m2 81     WBC      4.0 - 11.0 10e3/uL  7.5    RBC Count      4.40 - 5.90 10e6/uL  4.54    Hemoglobin      13.3 - 17.7 g/dL  14.3    Hematocrit      40.0 - 53.0 %  42.3    MCV      78 - 100 fL  93    MCH      26.5 - 33.0 pg  31.5    MCHC      31.5 - 36.5 g/dL  33.8    RDW      10.0 - 15.0 %  13.6    Platelet Count      150 - 450 10e3/uL  153      Component      Latest Ref Rng 12/5/2023  10:15 AM   PSA Tumor Marker      0.00 - 6.50 ng/mL 4.66        Imaging:  Reviewed and are as described above.     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     Damien's significant bilateral pulmonary embolism and right leg DVT back in July 2023 was an entirely unprovoked event.     Diagnosis:  Unprovoked bilateral pulmonary embolism and right leg DVT back on 7/8/2023.     Plan:  I  have a long discussion today with Damien about my recommendation and plan.     I also took some time today to educate him on DVT/PE, provoked vs unprovoked venous thromboembolic event, and the general approach in regard to anticoagulation therapy management and duration.     I explain to Damien that his rather significant bilateral pulmonary embolic event and right leg DVT back in July 2023 was an unprovoked event and in accordance to current American Society of Hematology (SOHAM) guidelines, he should remain on indefinite anticoagulation therapy. This is also my recommendation today. Damien seems to be very acceptance to this recommendation as he does not like to have another venous thromboembolic event in the future.     He has done well on apixaban in the past 6 months and thus I will keep him on apixaban. I did briefly discuss about decreasing his apixaban dose to a prophylactic dose regimen of 2.5 mg PO Q 12 hours. I explain to him that current SOHAM guidelines does not have any preference in regard to full therapeutic dose vs prophylactic dose for secondary pharmacological DVT/PE prophylaxis. Since he has done well in the past 6 months without any bleeding issues, the patient would like to stay on full therapeutic dose at this time. I have renewed his apixaban prescription today with a one year refill.     I will have him get a repeat right leg venous ultrasound to set as a new baseline in the next 1-2 weeks.     Although he does have a family history of DVT, I do not feel that a complete inherit thrombophilia workup is entirely necessary as the result of such workup will not change my recommendation that Damien should remain on indefinite anticoagulation therapy.     Patient is instructed to call our clinic if he should experience any unusual bleeding complications or if he should need any invasive or surgical procedures in the future. Otherwise, will plan to see him back in one year for routine follow up. Virtual or in  person visit is fine.          Michael Palomares PA-C, MPAS  Physician Assistant  [facility].     60 minutes spent by me on the date of the encounter doing chart review, history and exam, documentation and further activities per the note.    Time IN: 10:56  Time OUT: 10:45

## 2024-01-09 ENCOUNTER — OFFICE VISIT (OUTPATIENT)
Dept: HEMATOLOGY | Facility: CLINIC | Age: 74
End: 2024-01-09
Attending: NURSE PRACTITIONER
Payer: COMMERCIAL

## 2024-01-09 VITALS
SYSTOLIC BLOOD PRESSURE: 147 MMHG | HEART RATE: 78 BPM | DIASTOLIC BLOOD PRESSURE: 89 MMHG | WEIGHT: 269 LBS | OXYGEN SATURATION: 100 % | BODY MASS INDEX: 39.72 KG/M2 | TEMPERATURE: 95.8 F

## 2024-01-09 DIAGNOSIS — Z86.711 HISTORY OF PULMONARY EMBOLISM: ICD-10-CM

## 2024-01-09 DIAGNOSIS — I26.99 ACUTE PULMONARY EMBOLISM, UNSPECIFIED PULMONARY EMBOLISM TYPE, UNSPECIFIED WHETHER ACUTE COR PULMONALE PRESENT (H): ICD-10-CM

## 2024-01-09 PROCEDURE — G0463 HOSPITAL OUTPT CLINIC VISIT: HCPCS | Performed by: PHYSICIAN ASSISTANT

## 2024-01-09 PROCEDURE — 99205 OFFICE O/P NEW HI 60 MIN: CPT | Performed by: PHYSICIAN ASSISTANT

## 2024-01-09 NOTE — PATIENT INSTRUCTIONS
[facility]  [facility]  [facility] [address], [place] [address]  Main: [phone], Fax: [phone]    It was a pleasure seeing you today.  Thank you for allowing us to be involved in your care. Please let us know if there is anything else we can do for you, so that we can be sure you are leaving completely satisfied with your care experience.    Please stay on your blood thinner:  Eliquis  Please call us with any bleeding issues that you may have.    We would like you to repeat your imaging.    Wear compression stockings if you have swelling in your leg. Take them off while you are sleeping. You may need to get new stockings every 3-6 months with regular wear.    Patient Resources:   For additional information, please see the following web link:  [email or web address]    Call the [facility]  at [phone].     -If surgeries or procedures are planned (for holding instructions).     -If off anticoagulation, please call during high risk times (long-distance travel, broken bones or trauma, immobilization, surgery, pregnancy, or taking estrogen).     -Any new symptoms of DVT (deep vein thrombosis) or PE (pulmonary embolism)    -pain     -swelling     -redness    -warmth    -shortness of breath    -chest pain    -coughing up blood    We would like a provider on our team to see you at least annually for optimal care and to allow us to continue to prescribe for you.  Michael Palomares PA-C, Radhika Pizarro, MPH, PAGodwinC  and Lakeshia Real PA-C are our physician assistants that are specialized in bleeding and clotting disorders that you may be able to see more readily.    Return to clinic in one year.  Please schedule return appointment with Michael Palomares PA-C .    Your nurse clinician is Tomeka Molina RN, BSN, [phone] .   If she is unavailable and you have immediate concerns, please call [phone] and ask for a nurse.

## 2024-01-09 NOTE — LETTER
[facility]  [address] [place] [address] [address], [place] [address]  Main: [phone], Fax: [phone]    Patient seen at: [facility] at [address] [place] [address]    Outpatient Visit Note:    Patient: Sharad Yi  MRN: [id number]  : 1950  CHAKA: 2024    Reason for visit:  Bilateral pulmonary embolism and DVT of right peroneal vein as well as superficial thrombophlebitis in the right lesser saphenous vein back on 2023.     HPI:  Damien is a 73 year old male with a history of BPH, chronic knee pain S/P MCL tear and chronic intermittent tremor in the hand, who was found to have bilateral pulmonary embolism and DVT of the right leg back on 2023, referred by Rae Phillip CNP of [facility] for consultation.     Dated back to 2023, he was walking to his car and developed shortness of breath and associated dizziness and diaphoresis. He called 911 and eventually brought to the emergency department for evaluation. At the time, he reports that he has had progressively worsening dyspnea for the past year with significant progression for the few weeks prior to 2023. At the time, he reports no prior surgery since 2022 (right total hip replacement), no long distance travelling, no traumatic injuries, and no hospitalization. D-Dimer was elevated at 7.34. CTA chest at the time showed: Prominent bilateral pulmonary embolism burden leading to the distal portions of the right and left main pulmonary arteries. Mildly dilated main pulmonary artery root. Correlate with any evidence of cardiac strain. Mild patchy groundglass opacitites in both lungs may be mild edema and small cholelithiasis. Bilateral lower extremity venous ultrasound showed some moderate superficial thrombophlebitis in the lesser saphenous vein and mild acute DVT in the right peroneal vein of the calf. He eventually was discharged on 7/10/2023  on apixaban.     He had a visit with his primary care provider, Rae Phillip CNP on 2023 and is referred to our clinic for consultation.     Colonoscopy 2 months ago with no significant findings.     Currently he is on apixaban at 5 mg PO BID dosing. He denies any bleeding complications.     He reports today that he continues to have some shortness of breath on exertion. His symptoms has been ongoing even before he was found to have his pulmonary embolism in 2023 for the past 1-2 years.     ROS:  Denies any bleeding complications. Specifically, no frequent epistaxis. No issues with oral mucosal bleeding. Denies any hematuria or blood in stools. Denies any shortness of breath. No chest pain. No cough. No fever.    Medications:  Current Outpatient Medications   Medication     apixaban ANTICOAGULANT (ELIQUIS) 5 MG tablet     atorvastatin (LIPITOR) 80 MG tablet     betamethasone valerate (VALISONE) 0.1 % external cream     Cholecalciferol (VITAMIN D3 PO)     diphenhydrAMINE-acetaminophen (TYLENOL PM)  MG tablet     doxazosin (CARDURA) 4 MG tablet     metoprolol succinate ER (TOPROL XL) 50 MG 24 hr tablet     Current Facility-Administered Medications   Medication     betamethasone acet & sod phos (CELESTONE) injection 6 mg     Allergies:   No Known Allergies    PmHx:  Past Medical History:   Diagnosis Date     BPH (benign prostatic hyperplasia)      Chronic knee pain      Social History:   Deferred    Family History:  Damien has a total of 7 siblings. One of his younger sisters has a history of lower extremity DVT. Unclear of details. However, his other 6 siblings has had no history of DVT/PE.   His father  of a heart attack at 57.  Mother is still living at 98 years of age with no history of venous thromboembolism.   He has one son in his 40's with no history of venous thromboembolism.  He also has 2 grandchildren with no history of venous thromboembolism.     Objective:  Vitals: BP (!) 147/89 (BP  Location: Right arm, Patient Position: Sitting, Cuff Size: Adult Large)   Pulse 78   Temp (!) 95.8  F (35.4  C) (Tympanic)   Wt 122 kg (269 lb)   SpO2 100%   BMI 39.72 kg/m    Exam:   Complete exam is not performed today.       Labs:  Component      Latest Ref Rng 7/8/2023  10:03 AM 7/9/2023  4:07 AM   Sodium      136 - 145 mmol/L 139     Potassium      3.5 - 5.0 mmol/L 4.2     Chloride      98 - 107 mmol/L 107     Carbon Dioxide      22 - 31 mmol/L 22     Anion Gap      5 - 18 mmol/L 10     Urea Nitrogen      8 - 28 mg/dL 19     Creatinine      0.70 - 1.30 mg/dL 0.98     Calcium      8.5 - 10.5 mg/dL 9.0     Glucose      70 - 125 mg/dL 116     Alkaline Phosphatase      45 - 120 U/L 64     AST      0 - 40 U/L 18     ALT      0 - 45 U/L 23     Protein Total      6.0 - 8.0 g/dL 6.9     Albumin      3.5 - 5.0 g/dL 3.6     Bilirubin Total      0.0 - 1.0 mg/dL 0.9     GFR Estimate      >60 mL/min/1.73m2 81     WBC      4.0 - 11.0 10e3/uL  7.5    RBC Count      4.40 - 5.90 10e6/uL  4.54    Hemoglobin      13.3 - 17.7 g/dL  14.3    Hematocrit      40.0 - 53.0 %  42.3    MCV      78 - 100 fL  93    MCH      26.5 - 33.0 pg  31.5    MCHC      31.5 - 36.5 g/dL  33.8    RDW      10.0 - 15.0 %  13.6    Platelet Count      150 - 450 10e3/uL  153      Component      Latest Ref Rng 12/5/2023  10:15 AM   PSA Tumor Marker      0.00 - 6.50 ng/mL 4.66        Imaging:  Reviewed and are as described above.     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     Damien's significant bilateral pulmonary embolism and right leg DVT back in July 2023 was an entirely unprovoked event.     Diagnosis:  Unprovoked bilateral pulmonary embolism and right leg DVT back on  7/8/2023.     Plan:  I have a long discussion today with Damien about my recommendation and plan.     I also took some time today to educate him on DVT/PE, provoked vs unprovoked venous thromboembolic event, and the general approach in regard to anticoagulation therapy management and duration.     I explain to Damien that his rather significant bilateral pulmonary embolic event and right leg DVT back in July 2023 was an unprovoked event and in accordance to current American Society of Hematology (SOHAM) guidelines, he should remain on indefinite anticoagulation therapy. This is also my recommendation today. Damien seems to be very acceptance to this recommendation as he does not like to have another venous thromboembolic event in the future.     He has done well on apixaban in the past 6 months and thus I will keep him on apixaban. I did briefly discuss about decreasing his apixaban dose to a prophylactic dose regimen of 2.5 mg PO Q 12 hours. I explain to him that current SOHAM guidelines does not have any preference in regard to full therapeutic dose vs prophylactic dose for secondary pharmacological DVT/PE prophylaxis. Since he has done well in the past 6 months without any bleeding issues, the patient would like to stay on full therapeutic dose at this time. I have renewed his apixaban prescription today with a one year refill.     I will have him get a repeat right leg venous ultrasound to set as a new baseline in the next 1-2 weeks.     Although he does have a family history of DVT, I do not feel that a complete inherit thrombophilia workup is entirely necessary as the result of such workup will not change my recommendation that Damien should remain on indefinite anticoagulation therapy.     Patient is instructed to call our clinic if he should experience any unusual bleeding complications or if he should need any invasive or surgical procedures in the future. Otherwise, will plan to see him back in one year for routine  follow up. Virtual or in person visit is fine.          Michael Palomares PA-C, MPAS  Physician Assistant  [facility].     60 minutes spent by me on the date of the encounter doing chart review, history and exam, documentation and further activities per the note.    Time IN: 10:56  Time OUT: 10:45

## 2024-01-12 ENCOUNTER — HOSPITAL ENCOUNTER (OUTPATIENT)
Dept: ULTRASOUND IMAGING | Facility: HOSPITAL | Age: 74
Discharge: HOME | End: 2024-01-12
Attending: PHYSICIAN ASSISTANT | Admitting: PHYSICIAN ASSISTANT
Payer: COMMERCIAL

## 2024-01-12 ENCOUNTER — PATIENT OUTREACH (OUTPATIENT)
Dept: GASTROENTEROLOGY | Facility: CLINIC | Age: 74
End: 2024-01-12
Payer: COMMERCIAL

## 2024-01-12 DIAGNOSIS — Z86.711 HISTORY OF PULMONARY EMBOLISM: ICD-10-CM

## 2024-01-12 PROCEDURE — 93971 EXTREMITY STUDY: CPT | Mod: RT

## 2024-01-29 DIAGNOSIS — E78.5 HYPERLIPIDEMIA, UNSPECIFIED HYPERLIPIDEMIA TYPE: ICD-10-CM

## 2024-01-29 DIAGNOSIS — I10 ESSENTIAL HYPERTENSION, BENIGN: ICD-10-CM

## 2024-01-29 DIAGNOSIS — R35.1 NOCTURIA: ICD-10-CM

## 2024-01-29 RX ORDER — METOPROLOL SUCCINATE 50 MG/1
50 TABLET, EXTENDED RELEASE ORAL AT BEDTIME
Qty: 90 TABLET | Refills: 3 | Status: SHIPPED | OUTPATIENT
Start: 2024-01-29

## 2024-01-29 RX ORDER — ATORVASTATIN CALCIUM 80 MG/1
80 TABLET, FILM COATED ORAL AT BEDTIME
Qty: 90 TABLET | Refills: 3 | OUTPATIENT
Start: 2024-01-29

## 2024-01-29 RX ORDER — DOXAZOSIN 4 MG/1
TABLET ORAL
Qty: 90 TABLET | Refills: 3 | Status: ON HOLD | OUTPATIENT
Start: 2024-01-29 | End: 2024-09-16

## 2024-01-29 NOTE — TELEPHONE ENCOUNTER
Refill Request  Medication name: Pending Prescriptions:                       Disp   Refills    atorvastatin (LIPITOR) 80 MG tablet       90 tab*3            Sig: Take 1 tablet (80 mg) by mouth at bedtime           Increase dose since LDL is more elevated this           year. Recheck in one year    Requested Pharmacy:  [id number] [place], [place] - [address] [facility]  AT [facility]

## 2024-01-29 NOTE — TELEPHONE ENCOUNTER
Refill Request  Medication name: Pending Prescriptions:                       Disp   Refills    metoprolol succinate ER (TOPROL XL) 50 MG*90 tab*3            Sig: Take 1 tablet (50 mg) by mouth daily    Requested Pharmacy:  [facility] #[id number] [place] [id number] [facility]  AT [facility]

## 2024-01-29 NOTE — CONFIDENTIAL NOTE
Refill Request  Medication name: Pending Prescriptions:                       Disp   Refills    doxazosin (CARDURA) 4 MG tablet           90 tab*3            Sig: TAKE 1 TABLET(4 MG) BY MOUTH AT BEDTIME Strength:           4 mg    Requested Pharmacy:  [facility] #[id number] [facility] [address] [facility]  AT [facility]

## 2024-02-02 ENCOUNTER — OFFICE VISIT (OUTPATIENT)
Dept: INTERNAL MEDICINE | Facility: CLINIC | Age: 74
End: 2024-02-02
Payer: COMMERCIAL

## 2024-02-02 VITALS
TEMPERATURE: 98.1 F | HEART RATE: 85 BPM | SYSTOLIC BLOOD PRESSURE: 132 MMHG | BODY MASS INDEX: 39.84 KG/M2 | OXYGEN SATURATION: 97 % | HEIGHT: 69 IN | DIASTOLIC BLOOD PRESSURE: 84 MMHG | RESPIRATION RATE: 16 BRPM | WEIGHT: 269 LBS

## 2024-02-02 DIAGNOSIS — Z86.711 HISTORY OF PULMONARY EMBOLISM: ICD-10-CM

## 2024-02-02 DIAGNOSIS — Z79.01 CHRONIC ANTICOAGULATION: ICD-10-CM

## 2024-02-02 DIAGNOSIS — R31.9 URINARY TRACT INFECTION WITH HEMATURIA, SITE UNSPECIFIED: Primary | ICD-10-CM

## 2024-02-02 DIAGNOSIS — N39.0 URINARY TRACT INFECTION WITH HEMATURIA, SITE UNSPECIFIED: Primary | ICD-10-CM

## 2024-02-02 DIAGNOSIS — R31.0 GROSS HEMATURIA: ICD-10-CM

## 2024-02-02 LAB
ALBUMIN UR-MCNC: ABNORMAL MG/DL
APPEARANCE UR: ABNORMAL
BACTERIA #/AREA URNS HPF: ABNORMAL /HPF
BILIRUB UR QL STRIP: NEGATIVE
COLOR UR AUTO: YELLOW
GLUCOSE UR STRIP-MCNC: NEGATIVE MG/DL
HGB UR QL STRIP: ABNORMAL
KETONES UR STRIP-MCNC: NEGATIVE MG/DL
LEUKOCYTE ESTERASE UR QL STRIP: ABNORMAL
MUCOUS THREADS #/AREA URNS LPF: PRESENT /LPF
NITRATE UR QL: NEGATIVE
PH UR STRIP: 5.5 [PH] (ref 5–8)
RBC #/AREA URNS AUTO: >100 /HPF
SP GR UR STRIP: >=1.03 (ref 1–1.03)
SQUAMOUS #/AREA URNS AUTO: ABNORMAL /LPF
UROBILINOGEN UR STRIP-ACNC: 0.2 E.U./DL
WBC #/AREA URNS AUTO: ABNORMAL /HPF

## 2024-02-02 PROCEDURE — 81001 URINALYSIS AUTO W/SCOPE: CPT | Performed by: INTERNAL MEDICINE

## 2024-02-02 PROCEDURE — 99213 OFFICE O/P EST LOW 20 MIN: CPT | Performed by: INTERNAL MEDICINE

## 2024-02-02 RX ORDER — SULFAMETHOXAZOLE/TRIMETHOPRIM 800-160 MG
1 TABLET ORAL 2 TIMES DAILY
Qty: 20 TABLET | Refills: 0 | Status: SHIPPED | OUTPATIENT
Start: 2024-02-02 | End: 2024-02-12

## 2024-02-02 ASSESSMENT — ENCOUNTER SYMPTOMS
HEMATURIA: 1
COUGH: 1

## 2024-02-02 NOTE — PROGRESS NOTES
"  Subjective   Damien is a 74 year old, presenting for the following health issues:  Hematuria and Cough (Two weeks)      2/2/2024    10:01 AM   Additional Questions   Roomed by Teri Melo  Associated symptoms include coughing.   Cough    History of Present Illness       Reason for visit:  Cold and blood in urine  Symptom onset:  1-2 weeks ago  Symptoms include:  Cough runny nose    He eats 0-1 servings of fruits and vegetables daily.He consumes 0 sweetened beverage(s) daily.He exercises with enough effort to increase his heart rate 10 to 19 minutes per day.  He exercises with enough effort to increase his heart rate 3 or less days per week.   He is taking medications regularly.       Objective    /84 (BP Location: Right arm, Patient Position: Sitting, Cuff Size: Adult Regular)   Pulse 85   Temp 98.1  F (36.7  C)   Resp 16   Ht 1.753 m (5' 9\")   Wt 122 kg (269 lb)   SpO2 97%   BMI 39.72 kg/m    Body mass index is 39.72 kg/m .  Physical Exam   Damien appears well this morning, blood pressure satisfactory  No flank or abdomen tenderness  Lungs clear  Heart rhythm regular, oropharynx clear.    ASSESSMENT PLAN    Painless hematuria  Damien observed with 1 instance of painless reddish-brown urine yesterday February 2, 2024, with no associated pain, in the context of being on chronic anticoagulation with Eliquis because of history of pulmonary embolism in July 2023    Damien appears well today.  He is known to have renal cysts that have been seen on ultrasound.  He does recall a history of kidney stones from many years ago, but has not had any recent symptoms suggestive of kidney stones.  I did tell him that his observation of possible blood in the urine yesterday could be from kidney stones, especially if especially considering that he takes Eliquis.    Damien did give us a urine specimen today, and the laboratory is running it.  I will send in the result through the online [employer].    If it shows signs of " infection, then I would treat Damien with a course of antibiotics.    If it shows blood but not signs of infection, then I will instruct Damien to work with his usual PCP on a hematuria workup.  That could start with a CT urogram, which would show the urinary tract from the kidneys all the way to the bladder, and would reveal things like stones or tumors.  I told him that if the CT urogram does not find the problem, the next step would be to have a consultation with a urologist, and maybe undergo a cystoscopy.  The need to pursue this would be more pressing if rate continue to observe or bloody urine, or if he gets pain referable to the urinary tract, for example flank pain or lower abdominal pain    Viral URI  Cough going on about 2 weeks, but I do not see any signs of bacterial infection, I think he caught one of the common community viral bugs, and should let this resolve on its own.  Okay to use symptomatic measures like Robitussin and over-the-counter cold remedies, but avoid decongestants because those could raise blood pressure and affect bladder function.    ADDENDUM  Urinalysis confirms hematuria, but there is also elevated white cells, suggesting the possibility of some infection component.  Will send a Klip message, informing him that I like to treat him with 10 days of antibiotic trimethoprim sulfa twice a day.  But he should definitely follow-up with his PCP to continue the hematuria workup  Recent Results (from the past 48 hour(s))   UA Macroscopic with reflex to Microscopic and Culture - Lab Collect    Collection Time: 02/02/24 10:17 AM    Specimen: Urine, Clean Catch   Result Value Ref Range    Color Urine Yellow Colorless, Straw, Light Yellow, Yellow    Appearance Urine Cloudy (A) Clear    Glucose Urine Negative Negative mg/dL    Bilirubin Urine Negative Negative    Ketones Urine Negative Negative mg/dL    Specific Gravity Urine >=1.030 1.005 - 1.030    Blood Urine Large (A) Negative    pH Urine 5.5  5.0 - 8.0    Protein Albumin Urine Trace (A) Negative mg/dL    Urobilinogen Urine 0.2 0.2, 1.0 E.U./dL    Nitrite Urine Negative Negative    Leukocyte Esterase Urine Small (A) Negative   UA Microscopic with Reflex to Culture    Collection Time: 02/02/24 10:17 AM   Result Value Ref Range    Bacteria Urine Few (A) None Seen /HPF    RBC Urine >100 (A) 0-2 /HPF /HPF    WBC Urine 5-10 (A) 0-5 /HPF /HPF    Squamous Epithelials Urine Few (A) None Seen /LPF    Mucus Urine Present (A) None Seen /LPF           Signed Electronically by: ALINE CHOI MD

## 2024-02-07 ENCOUNTER — ANCILLARY PROCEDURE (OUTPATIENT)
Dept: GENERAL RADIOLOGY | Facility: CLINIC | Age: 74
End: 2024-02-07
Attending: FAMILY MEDICINE
Payer: COMMERCIAL

## 2024-02-07 ENCOUNTER — OFFICE VISIT (OUTPATIENT)
Dept: FAMILY MEDICINE | Facility: CLINIC | Age: 74
End: 2024-02-07
Payer: COMMERCIAL

## 2024-02-07 VITALS
WEIGHT: 269 LBS | SYSTOLIC BLOOD PRESSURE: 130 MMHG | DIASTOLIC BLOOD PRESSURE: 72 MMHG | TEMPERATURE: 97.6 F | BODY MASS INDEX: 39.72 KG/M2 | HEART RATE: 72 BPM | OXYGEN SATURATION: 98 %

## 2024-02-07 DIAGNOSIS — R05.1 ACUTE COUGH: ICD-10-CM

## 2024-02-07 DIAGNOSIS — R31.9 HEMATURIA, UNSPECIFIED TYPE: Primary | ICD-10-CM

## 2024-02-07 DIAGNOSIS — Z23 NEED FOR VACCINATION: ICD-10-CM

## 2024-02-07 DIAGNOSIS — Z29.11 NEED FOR VACCINATION AGAINST RESPIRATORY SYNCYTIAL VIRUS: ICD-10-CM

## 2024-02-07 PROBLEM — K76.0 STEATOSIS OF LIVER: Status: RESOLVED | Noted: 2023-12-07 | Resolved: 2024-02-07

## 2024-02-07 PROCEDURE — 91320 SARSCV2 VAC 30MCG TRS-SUC IM: CPT | Performed by: FAMILY MEDICINE

## 2024-02-07 PROCEDURE — 90480 ADMN SARSCOV2 VAC 1/ONLY CMP: CPT | Performed by: FAMILY MEDICINE

## 2024-02-07 PROCEDURE — 99214 OFFICE O/P EST MOD 30 MIN: CPT | Performed by: FAMILY MEDICINE

## 2024-02-07 PROCEDURE — 71046 X-RAY EXAM CHEST 2 VIEWS: CPT | Mod: TC | Performed by: RADIOLOGY

## 2024-02-07 RX ORDER — RESPIRATORY SYNCYTIAL VIRUS VACCINE 120MCG/0.5
0.5 KIT INTRAMUSCULAR ONCE
Qty: 1 EACH | Refills: 0 | Status: SHIPPED | OUTPATIENT
Start: 2024-02-07 | End: 2024-02-07

## 2024-02-07 NOTE — LETTER
February 7, 2024      Damien Colbertchristian  [address] [facility] [address]        Dear ,    We are writing to inform you of your test results.  The official radiology interpretation is that this essentially is a normal chest x-ray you do not have pneumonia probably have a virus you are still getting over.        Resulted Orders   XR Chest 2 Views    Narrative    EXAM: XR CHEST 2 VIEWS  LOCATION: [facility]  DATE: 2/7/2024    INDICATION:  Acute cough  COMPARISON: CT topogram 07/08/2023      Impression    IMPRESSION: Thoracic DISH. Limited inspiratory volume. Cardiac size difficult to assess with this degree of inflation. No pulmonary vascular congestion, pneumonia, or pleural effusion seen.       If you have any questions or concerns, please call the clinic at the number listed above.       Sincerely,      Ilia Horner MD

## 2024-02-07 NOTE — PROGRESS NOTES
Assessment & Plan     (R31.9) Hematuria, unspecified type  (primary encounter diagnosis)  Comment: Patient with recent gross hematuria though he has had hematuria noted previously.  Plan: CT Abdomen Pelvis w/o Contrast, Adult Urology          Referral             (Z29.11) Need for vaccination against respiratory syncytial virus  Comment: Recommend at his local pharmacy  Plan: respiratory syncytial virus vaccine, bivalent         (ABRYSVO) injection             (R05.1) Acute cough  Comment: Patient with a cough presumed viral chest x-ray negative for pneumonia.   Plan: XR Chest 2 Views             (Z23) Need for vaccination  Comment:    Plan: COVID-19 12+ (2023-24) (PFIZER)             PLAN:  1.  Two-view chest x-ray reviewed with the patient.  2.  Patient is finishing up a course of Bactrim already prescribed  3.  CT scan of the abdomen pelvis without contrast  4.  Urology referral                  Enzo Quezada is a 74 year old, presenting for the following health issues:  Patient comes in with several distinct issues.  He was seen on February 2 for obvious blood in his urine the urinalysis and micro did not reveal significant amount of red blood cells, some white cells noted also patient started empirically on Bactrim he is noticing improvement and that has not seen as much redness in the urine but I did review that he has had some red blood cells in his urine previously and I think we should do a workup for this.    Of note, the patient was not having any back pain or abdominal pain he was not having any frequency of urination or disc comfort with urination.    I discussed with the patient that I want to get a CT scan of the abdomen pelvis and I think he needs to see urology as well    Patient has a history of nephrolithiasis in the past    Patient also reports about a 3-week history of a cough sometimes productive no fever or chills probably getting a little bit better is not waking him up at  night but it is persistent he does not have a history of asthma or lung disease.          Bloood in Urine Follow Up and Cough for almost 3 weeks.    HPI                   Objective    /72   Pulse 72   Temp 97.6  F (36.4  C)   Wt 122 kg (269 lb)   SpO2 98%   BMI 39.72 kg/m    Body mass index is 39.72 kg/m .  Physical Exam   GENERAL: alert and no distress  EYES: Eyes grossly normal to inspection, PERRL and conjunctivae and sclerae normal  HENT: ear canals and TM's normal, nose and mouth without ulcers or lesions  NECK: no adenopathy, no asymmetry, masses, or scars  RESP: lungs clear to auscultation - no rales, rhonchi or wheezes  CV: regular rate and rhythm, normal S1 S2, no S3 or S4, no murmur, click or rub, no peripheral edema  MS: no gross musculoskeletal defects noted, no edema            Signed Electronically by: Ilia Horner MD

## 2024-02-22 ENCOUNTER — OFFICE VISIT (OUTPATIENT)
Dept: UROLOGY | Facility: CLINIC | Age: 74
End: 2024-02-22
Attending: FAMILY MEDICINE
Payer: COMMERCIAL

## 2024-02-22 VITALS
HEART RATE: 68 BPM | BODY MASS INDEX: 39.84 KG/M2 | DIASTOLIC BLOOD PRESSURE: 88 MMHG | HEIGHT: 69 IN | SYSTOLIC BLOOD PRESSURE: 140 MMHG | WEIGHT: 269 LBS | OXYGEN SATURATION: 96 % | TEMPERATURE: 97.5 F

## 2024-02-22 DIAGNOSIS — R31.0 GROSS HEMATURIA: ICD-10-CM

## 2024-02-22 LAB
ALBUMIN UR-MCNC: NEGATIVE MG/DL
APPEARANCE UR: CLEAR
BACTERIA #/AREA URNS HPF: ABNORMAL /HPF
BILIRUB UR QL STRIP: NEGATIVE
COLOR UR AUTO: YELLOW
GLUCOSE UR STRIP-MCNC: NEGATIVE MG/DL
HGB UR QL STRIP: NEGATIVE
KETONES UR STRIP-MCNC: NEGATIVE MG/DL
LEUKOCYTE ESTERASE UR QL STRIP: ABNORMAL
NITRATE UR QL: NEGATIVE
PH UR STRIP: 6 [PH] (ref 5–7)
RBC #/AREA URNS AUTO: ABNORMAL /HPF
SP GR UR STRIP: 1.02 (ref 1–1.03)
UROBILINOGEN UR STRIP-ACNC: 1 E.U./DL
WBC #/AREA URNS AUTO: ABNORMAL /HPF
WBC CLUMPS #/AREA URNS HPF: PRESENT /HPF

## 2024-02-22 PROCEDURE — 81001 URINALYSIS AUTO W/SCOPE: CPT | Performed by: STUDENT IN AN ORGANIZED HEALTH CARE EDUCATION/TRAINING PROGRAM

## 2024-02-22 PROCEDURE — 87086 URINE CULTURE/COLONY COUNT: CPT | Performed by: STUDENT IN AN ORGANIZED HEALTH CARE EDUCATION/TRAINING PROGRAM

## 2024-02-22 PROCEDURE — 99203 OFFICE O/P NEW LOW 30 MIN: CPT | Performed by: STUDENT IN AN ORGANIZED HEALTH CARE EDUCATION/TRAINING PROGRAM

## 2024-02-22 PROCEDURE — 88112 CYTOPATH CELL ENHANCE TECH: CPT | Performed by: PATHOLOGY

## 2024-02-22 ASSESSMENT — PAIN SCALES - GENERAL: PAINLEVEL: NO PAIN (0)

## 2024-02-22 NOTE — PROGRESS NOTES
"        Chief Complaint:   Gross hematuria         History of Present Illness:   Sharad Yi is a 74 year old male with a history of NAFLD, HLD, HTN, and pulmonary embolism who presents for evaluation of gross hematuria.     The patient reports gross hematuria that last for a few days last month. He denies recurrent episodes since.     He currently denies any dysuria, pyuria, hesitancy, intermittency, feelings of incomplete emptying, or any recent history of urinary tract infections or stones.    He had a kidney stone about 20 years ago that he passed without intervention.    He is a never smoker.          Past Medical History:     Past Medical History:   Diagnosis Date    BPH (benign prostatic hyperplasia)     Chronic knee pain             Past Surgical History:     Past Surgical History:   Procedure Laterality Date    MENISCECTOMY Right     ZZC APPENDECTOMY      Description: Appendectomy;  Recorded: 02/05/2013;            Medications     Current Outpatient Medications   Medication    apixaban ANTICOAGULANT (ELIQUIS) 5 MG tablet    atorvastatin (LIPITOR) 80 MG tablet    betamethasone valerate (VALISONE) 0.1 % external cream    Cholecalciferol (VITAMIN D3 PO)    diphenhydrAMINE-acetaminophen (TYLENOL PM)  MG tablet    doxazosin (CARDURA) 4 MG tablet    metoprolol succinate ER (TOPROL XL) 50 MG 24 hr tablet     Current Facility-Administered Medications   Medication    betamethasone acet & sod phos (CELESTONE) injection 6 mg            Allergies:   Patient has no known allergies.         Review of Systems:  From intake questionnaire   Negative 14 system review except as noted on HPI, nurse's note.         Physical Exam:   Patient is a 74 year old  male   Vitals: Blood pressure (!) 140/88, pulse 68, temperature 97.5  F (36.4  C), temperature source Tympanic, height 1.753 m (5' 9\"), weight 122 kg (269 lb), SpO2 96%.  General Appearance Adult: Alert, no acute distress, oriented.  Lungs: Non-labored " breathing.  Heart: No obvious jugular venous distension present.  Neuro: Alert, oriented, speech and mentation normal      Labs and Pathology:    I personally reviewed all applicable laboratory data and went over findings with patient  Significant for:    CBC RESULTS:  Recent Labs   Lab Test 07/09/23  0407 07/08/23  1003 10/28/22  1112   WBC 7.5 6.1 6.1   HGB 14.3 14.6 14.6    153 206        BMP RESULTS:  Recent Labs   Lab Test 07/09/23  0407 07/08/23  1003 12/08/22  1105 10/28/22  1112 10/05/21  0938 09/18/20  1400 03/25/19  0841 02/26/18  0940    139 143 142   < > 140 143 140   POTASSIUM 4.4 4.2 4.1 4.5   < > 4.4 4.3 4.6   CHLORIDE 106 107 109* 106   < > 106 108* 108*   CO2 24 22 24 25   < > 26 24 22   ANIONGAP 11 10 10 11   < > 8 11 10    116 96 106*   < > 92 100 103   BUN 13 19 22.1 17.6   < > 13 17 24*   CR 0.86 0.98 0.90 1.01   < > 0.82 0.89 0.85   GFRESTIMATED >90 81 90 79   < > >60 >60 >60   GFRESTBLACK  --   --   --   --   --  >60 >60 >60   BRIANDA 9.1 9.0 8.7* 9.5   < > 8.7 9.2 8.9    < > = values in this interval not displayed.       UA RESULTS:   Recent Labs   Lab Test 02/02/24  1017 12/08/22  1114 07/21/22  1006   SG >=1.030 1.025 >=1.030   URINEPH 5.5 5.5 6.0   NITRITE Negative Negative Negative   RBCU >100* 2-5* >100*  >100*   WBCU 5-10* 25-50* 10-25*  10-25*       PSA RESULTS  Prostate Specific Antigen Screen   Date Value Ref Range Status   12/05/2023 4.66 0.00 - 6.50 ng/mL Final   12/08/2022 5.32 0.00 - 6.50 ng/mL Final   10/05/2021 3.18 0.00 - 6.50 ug/L Final   09/18/2020 3.8 0.0 - 6.5 ng/mL Final   03/25/2019 3.4 0.0 - 6.5 ng/mL Final   02/26/2018 3.6 0.0 - 4.5 ng/mL Final   04/04/2017 3.5 0.0 - 4.5 ng/mL Final   04/11/2016 3.4 0.0 - 4.5 ng/mL Final   03/02/2015 6.7 (H) 0.0 - 4.5 ng/mL Final   02/23/2015 8.2 (H) 0.0 - 4.5 ng/mL Final   02/10/2014 3.4 <4.6 ng/mL Final     Comment:     Method is Abbott Prostate-Specific Antigen (PSA)            Standard-WHO 1st International  (90:10) as of 09/26/05 02/05/2013 3.43 <4.01 ng/mL Final            Assessment and Plan:     Assessment: Mr. Sharad Yi is a pleasant 74 year old male with gross hematuria that was present last month and has since resolved.  The differential diagnosis at this point includes stone disease, infection, urothelial malignancy, renal disorder versus another yet unknown diagnosis.    Plan:  At this time, recommend proceeding with comprehensive hematuria evaluation to include:  - Urinalysis and urine culture to rule out an acute urinary tract infection.   - Urine cytology to look for cells concerning for malignancy.  - CT urogram for upper tract imaging.  - Cystoscopy to evaluate the interior of the bladder. Follow up for hematuria as recommended by urologist performing cystoscopic evaluation.      ELISABETH GONZALEZ PA-C  Department of Urology

## 2024-02-23 LAB
BACTERIA UR CULT: NORMAL
PATH REPORT.COMMENTS IMP SPEC: NORMAL
PATH REPORT.FINAL DX SPEC: NORMAL
PATH REPORT.GROSS SPEC: NORMAL
PATH REPORT.MICROSCOPIC SPEC OTHER STN: NORMAL
PATH REPORT.RELEVANT HX SPEC: NORMAL

## 2024-03-13 ENCOUNTER — HOSPITAL ENCOUNTER (OUTPATIENT)
Dept: CT IMAGING | Facility: CLINIC | Age: 74
Discharge: HOME | End: 2024-03-13
Attending: STUDENT IN AN ORGANIZED HEALTH CARE EDUCATION/TRAINING PROGRAM | Admitting: STUDENT IN AN ORGANIZED HEALTH CARE EDUCATION/TRAINING PROGRAM
Payer: COMMERCIAL

## 2024-03-13 DIAGNOSIS — R31.0 GROSS HEMATURIA: ICD-10-CM

## 2024-03-13 LAB
CREAT BLD-MCNC: 1 MG/DL (ref 0.7–1.3)
EGFRCR SERPLBLD CKD-EPI 2021: >60 ML/MIN/1.73M2

## 2024-03-13 PROCEDURE — 250N000009 HC RX 250: Performed by: RADIOLOGY

## 2024-03-13 PROCEDURE — 250N000011 HC RX IP 250 OP 636: Performed by: RADIOLOGY

## 2024-03-13 PROCEDURE — 82565 ASSAY OF CREATININE: CPT

## 2024-03-13 PROCEDURE — 74178 CT ABD&PLV WO CNTR FLWD CNTR: CPT

## 2024-03-13 RX ORDER — IOPAMIDOL 755 MG/ML
100 INJECTION, SOLUTION INTRAVASCULAR ONCE
Status: COMPLETED | OUTPATIENT
Start: 2024-03-13 | End: 2024-03-13

## 2024-03-13 RX ADMIN — IOPAMIDOL 100 ML: 755 INJECTION, SOLUTION INTRAVENOUS at 10:17

## 2024-03-13 RX ADMIN — SODIUM CHLORIDE 100 ML: 9 INJECTION, SOLUTION INTRAVENOUS at 10:18

## 2024-03-15 ENCOUNTER — TELEPHONE (OUTPATIENT)
Dept: UROLOGY | Facility: CLINIC | Age: 74
End: 2024-03-15
Payer: COMMERCIAL

## 2024-03-15 NOTE — TELEPHONE ENCOUNTER
Called patient to discuss CT urogram results. 1.5 cm bladder stone was noted. Discussed with Dr. Kennedy who recommended patient keep his upcoming cystoscopy appointment for evaluation and to discuss treatment of stone, likely with treatment of BPH as well.     Dr. Kennedy would like uroflow and PVR prior to cystoscopy. This was scheduled.

## 2024-04-02 NOTE — PROGRESS NOTES
Reason for cystoscopy: gross hematuria     Brief History:    74-year-old male  Past medical history is significant for BPH, chronic knee pain, bilateral pulmonary embolism and DVT managed with apixaban 5 mg twice a day    He also has a history of a kidney stone about 20 years ago that he passed without intervention    He had transient painless gross hematuria January  He is a never smoker  Does report a longstanding history of weak urinary stream, intermittency    Imaging  IMPRESSION:   1.  Minimal urinary bladder wall thickening and mucosal  hyperenhancement. Recommend correlation with urinalysis to exclude  cystitis if clinically indicated.  2.  15 mm urinary bladder calculus.  3.  Simple appearing appearing renal cysts. No follow-up is necessary.  4.  Prostatomegaly.  5.  Colonic diverticulosis without signs of diverticulitis.  6.  Cholelithiasis without evidence of acute cholecystitis.  7.  Additional nonacute findings as above.      The following distinct labs were reviewed   Most Recent 3 CBC's:  Recent Labs   Lab Test 07/09/23  0407 07/08/23  1003 10/28/22  1112   WBC 7.5 6.1 6.1   HGB 14.3 14.6 14.6   MCV 93 94 94    153 206     Most Recent 3 BMP's:  Recent Labs   Lab Test 03/13/24  1010 07/09/23  0407 07/08/23  1003 12/08/22  1105   NA  --  141 139 143   POTASSIUM  --  4.4 4.2 4.1   CHLORIDE  --  106 107 109*   CO2  --  24 22 24   BUN  --  13 19 22.1   CR 1.0 0.86 0.98 0.90   ANIONGAP  --  11 10 10   BRIANDA  --  9.1 9.0 8.7*   GLC  --  103 116 96     Most Recent Urinalysis:  Recent Labs   Lab Test 02/22/24  0945   COLOR Yellow   APPEARANCE Clear   URINEGLC Negative   URINEBILI Negative   URINEKETONE Negative   SG 1.025   UBLD Negative   URINEPH 6.0   PROTEIN Negative   UROBILINOGEN 1.0   NITRITE Negative   LEUKEST Small*   RBCU 0-2   WBCU 25-50*       PSA 4.66            Prostate size around 74 grams      CYSTOSCOPY  We discussed the risks and benefits of the procedure which include risk of bleeding and  infection.  Informed consent was obtained, the patient was prepped and draped in the standard sterile fashion.  The 15 German flexible cystoscope was inserted through the urethral meatus.      The anterior urethra was:  normal without stricture.    The external sphincter was  appropriately coapted.   The prostatic urethra demonstrated bilobar hypertrophy.    The bladder neck was  nonocclusive.    The bladder was  unremarkable for tumors  I did encounter a large stone that was near the bladder neck  Bladder was quite distended I emptied out and filled it.  There are a lot of trabeculations and diverticuli   I could not see the ureteral orifices easily  Large capacity bladder.    He voided around 200 mL and a postvoid residual was measured by bladder scan that showed around 204 mL    EVALUATION AND MANAGEMENT   Upon completion of the cystoscopy the patient was dressed to discuss findings of above testing, review available treatment options and make a plan for further steps in care     SUMMARY:    This is a 74-year-old male with a history of BPH managed with doxazosin, bilateral pulmonary embolism and DVT managed with apixaban who is presenting for gross hematuria.    Gross hematuria  CT urogram unremarkable for tumor.  Cystoscopy today did not show any evidence of malignancy.  He does have a large bladder stone and being on anticoagulation likely this is the source of bleeding.    #2 bladder stone  I suspect is formed due to incomplete emptying of the bladder.  Today he is voiding around 50% efficiency.  There is a large stone that we will need to do cystolitholapaxy in the operating room to remove.    #3 incomplete emptying  I think it is due to his enlarged prostate is around 77 g and is mostly bilobar hypertrophy causing obstruction.  I discussed this at length with him and I discussed many treatment options for this.  He is already on medication with doxazosin.  I suspect that we will need to address the prostate  and the bladder stone at the same time for him to not have a new recurrence of the bladder stone and he is agreeable with this.  We discussed minimally invasive options versus tissue removal option such as transurethral resection of the prostate holmium enucleation of the prostate.    We discussed the available surgical treatment options for BPH and went over the risk/benefit profile of each including retrograde ejaculation, bleeding, infection, damage to the urethra, prostate and bladder, urinary incontinence, pelvic pain, identification of incidental prostate cancer and need for additional intervention.    We discussed that Urolift and Rezum are the most minimally invasive treatment (MIST) options with the lowest likelihood of side effects though these treatments are relatively new and long term data regarding durability is limited.  Additionally, functional improvement is less likely to be as dramatic relative to more invasive procedures.    We also discussed the transurethral tissue removing procedures including TURP, Greenlight PVP, and HoLEP and discussed that these procedures carry higher perioperative risk profiles but greater degree of symptom improvement and likely increased durability.  Additionally, we discussed that some degree of post-operative urinary leakage and frequency is common after these procedures though often is limited to the first few months of recovery.    Ultimately the decision was made to proceed with holmium laser nucleation of the prostate and cystolitholapaxy bladder stone.  He is not sure about the timing of this because he plays golf in the summer and does not wanted to interfere with the recovery.  He will take a look and let us know      25 minutes spent were spent on the E/M portion of the visit in a separate examination room after cystoscopy discussing findings, available treatment options and next steps in care.

## 2024-04-03 ENCOUNTER — ALLIED HEALTH/NURSE VISIT (OUTPATIENT)
Dept: UROLOGY | Facility: CLINIC | Age: 74
End: 2024-04-03
Payer: COMMERCIAL

## 2024-04-03 ENCOUNTER — OFFICE VISIT (OUTPATIENT)
Dept: UROLOGY | Facility: CLINIC | Age: 74
End: 2024-04-03
Payer: COMMERCIAL

## 2024-04-03 VITALS
RESPIRATION RATE: 16 BRPM | BODY MASS INDEX: 39.6 KG/M2 | SYSTOLIC BLOOD PRESSURE: 155 MMHG | DIASTOLIC BLOOD PRESSURE: 100 MMHG | HEIGHT: 69 IN | HEART RATE: 72 BPM | WEIGHT: 267.4 LBS | OXYGEN SATURATION: 64 % | TEMPERATURE: 97.1 F

## 2024-04-03 DIAGNOSIS — N40.1 BENIGN PROSTATIC HYPERPLASIA WITH INCOMPLETE BLADDER EMPTYING: ICD-10-CM

## 2024-04-03 DIAGNOSIS — R31.0 GROSS HEMATURIA: Primary | ICD-10-CM

## 2024-04-03 DIAGNOSIS — R39.14 BENIGN PROSTATIC HYPERPLASIA WITH INCOMPLETE BLADDER EMPTYING: ICD-10-CM

## 2024-04-03 DIAGNOSIS — Z53.9 DIAGNOSIS NOT YET DEFINED: Primary | ICD-10-CM

## 2024-04-03 PROCEDURE — 99207 PR NO CHARGE NURSE ONLY: CPT

## 2024-04-03 PROCEDURE — 52000 CYSTOURETHROSCOPY: CPT | Performed by: STUDENT IN AN ORGANIZED HEALTH CARE EDUCATION/TRAINING PROGRAM

## 2024-04-03 PROCEDURE — 51798 US URINE CAPACITY MEASURE: CPT | Performed by: STUDENT IN AN ORGANIZED HEALTH CARE EDUCATION/TRAINING PROGRAM

## 2024-04-03 PROCEDURE — 99213 OFFICE O/P EST LOW 20 MIN: CPT | Mod: 25 | Performed by: STUDENT IN AN ORGANIZED HEALTH CARE EDUCATION/TRAINING PROGRAM

## 2024-04-03 PROCEDURE — 87076 CULTURE ANAEROBE IDENT EACH: CPT | Performed by: STUDENT IN AN ORGANIZED HEALTH CARE EDUCATION/TRAINING PROGRAM

## 2024-04-03 PROCEDURE — 87086 URINE CULTURE/COLONY COUNT: CPT | Performed by: STUDENT IN AN ORGANIZED HEALTH CARE EDUCATION/TRAINING PROGRAM

## 2024-04-03 RX ORDER — FINASTERIDE 5 MG/1
5 TABLET, FILM COATED ORAL DAILY
Qty: 30 TABLET | Refills: 1 | Status: SHIPPED | OUTPATIENT
Start: 2024-04-03 | End: 2024-09-13

## 2024-04-03 RX ADMIN — Medication 500 MG: at 08:30

## 2024-04-03 ASSESSMENT — PAIN SCALES - GENERAL: PAINLEVEL: NO PAIN (0)

## 2024-04-03 NOTE — NURSING NOTE
"Chief Complaint   Patient presents with    Cystoscopy       Vitals:    04/03/24 0808 04/03/24 0810   BP: (!) 163/100 (!) 155/100   BP Location: Right arm Left arm   Patient Position: Sitting    Cuff Size: Adult Regular    Pulse: 72    Resp: 16    Temp: 97.1  F (36.2  C)    TempSrc: Tympanic    SpO2: (!) 64%    Weight: 121.3 kg (267 lb 6.4 oz)    Height: 1.753 m (5' 9\")      Wt Readings from Last 1 Encounters:   04/03/24 121.3 kg (267 lb 6.4 oz)       Colleen MOSER CMA.................4/3/2024    "

## 2024-04-03 NOTE — PATIENT INSTRUCTIONS
Holmium Laser Enucleation of the Prostate Surgery    Benign prostatic hyperplasia  Your prostate is a walnut-sized gland. It surrounds the urethra just below the bladder. When you have benign prostatic hyperplasia (BPH), your prostate is larger than usual   (Figure 1B). It can create pressure on your urethra, which can begin to block the flow of urine. This may cause you to have a slow stream of urine or difficulty starting a stream of urine. This   also may force your bladder muscle to work too hard and become irritable or weakened. If you have BPH, the muscles of your pelvic floor do not have to work as hard to prevent urine leakage. Not   using these muscles can cause them to weaken        HoLEP surgery  HoLEP surgery is a treatment for BPH that decreases the pressure on your urethra   caused by the enlarged prostate. HoLEP surgery is done through a thin, tube-like scope   instrument inserted through your penis. It does not use incisions through your skin. The   inside tissue of your prostate is removed with a laser tool, leaving the outer shell intact.   This can be compared to hollowing out the juicy part of an orange and leaving the peel   (Figure 2). Another device (morcellator) acts like a small  to break up the   shelled-out tissue and remove it from your bladder. This way, there is no tissue that has   to be passed through your urine        HoLEP surgery is a treatment option for men with any size prostate and any of these   conditions:  ? You have bothersome urinary symptoms due to BPH.  ? You have had past procedures that have not fixed the BPH (in some cases).  ? You have a weak bladder and BPH.  ? You have a blocked flow of urine due to prostate cancer (HoLEP surgery is not a   treatment for prostate cancer).  ? You need treatment for BPH again.  ? You also need surgery for bladder stones, upper urinary tract stones or other urinary   problems.    Benefits of HoLEP surgery include:  ?  Patients do not typically need treatment for BPH again.  ? You may only need a urinary catheter (tube in the bladder) for a short time after   surgery.  ? The surgery can be done with less risk of bleeding even if the patient is taking an   anticoagulant (blood thinner) medication. This is because a laser is used.  ? The recovery time is shorter than other BPH surgeries. Most patients can resume   physical activities such as exercise 2 weeks after surgery    During the procedure  The procedure is done while you are asleep (under anesthesia). The urologist will insert   the thin, tube-like scope instrument through your urethra (Figure 3A). A high-powered   holmium laser will be used through the scope to core out the enlarged prostate tissue.   This will make your prostate smaller (Figures 3B, 3C). Leftover prostate tissue in your   bladder (Figure 3D) will then be removed by the morcellator device and suction.        After the procedure  You will have a catheter in your bladder after the procedure. We typically watch you overnight in the hospital and take the catheter out the next day. Sometimes we send you home same day with the catheter to have it taken out in clinic.    Care at home after surgery    What to expect  Expect to see blood in your urine for 1 to 2 weeks. This may last longer if you take   anticoagulant medication.  Some men may notice these symptoms after surgery:  ? Burning when you urinate  ? A small amount of pain or discomfort  ? Urine leakage (incontinence)    Activity  It is important to limit your activity at first to allow for healing.  ? First week - Rest and do as little activity as possible. Do not lift anything heavier than   10 pounds for 1 week.  ? Week 2 - Limit your activity, including exercise, to half of what you normally do.  ? Week 3 - You can return to your regular activities as you are able.    Limit strenuous activities such as biking, horseback riding and motorcycle riding for    1 month.    Retrograde ejaculation  Retrograde ejaculation means that ejaculate (semen) does not come out of the penis   during orgasm. You will have retrograde ejaculation after your surgery. Your erections   and orgasms will feel the same. Some men say the experience of the orgasm is somehow   changed without the ejaculate. Unfortunately, this is an irreversible effect of HoLEP.   After the prostate has been enucleated or  hollowed out , there is not enough pressure   to make the semen come out.    Caring for your bladder  You may have incontinence after your surgery. This is because the muscles that support   your bladder and stop the flow of urine are weak. You also may have an overactive   bladder. Incontinence does not usually last. It can improve if you do pelvic floor muscle   exercises. These tips can also help:  ? Wear an absorbent brief or pad to stay dry.  ? Stay hydrated. It is easy to think that if you drink less water, you will have less   urine leakage. This can actually make it worse because dehydration can irritate the   bladder.  ? Try to drink at least 2 liters (or 8 full 8-ounce glasses) of fluid a day unless your   urologist tells you otherwise. Fluids will also help to flush blood from your urine.

## 2024-04-03 NOTE — PROGRESS NOTES
Active order to obtain bladder scan? Yes   Name of ordering provider: Dr. Kennedy  Bladder scan preformed post void yes.    Bladder scan reveled 204ML  Provider notified?  Yes    Colleen MOSER CMA

## 2024-04-05 ENCOUNTER — PREP FOR PROCEDURE (OUTPATIENT)
Dept: UROLOGY | Facility: CLINIC | Age: 74
End: 2024-04-05
Payer: COMMERCIAL

## 2024-04-06 LAB — BACTERIA UR CULT: ABNORMAL

## 2024-04-08 ENCOUNTER — OFFICE VISIT (OUTPATIENT)
Dept: SLEEP MEDICINE | Facility: CLINIC | Age: 74
End: 2024-04-08
Attending: NURSE PRACTITIONER
Payer: COMMERCIAL

## 2024-04-08 VITALS
BODY MASS INDEX: 40.11 KG/M2 | OXYGEN SATURATION: 98 % | WEIGHT: 270.8 LBS | SYSTOLIC BLOOD PRESSURE: 152 MMHG | DIASTOLIC BLOOD PRESSURE: 98 MMHG | HEIGHT: 69 IN | HEART RATE: 72 BPM

## 2024-04-08 DIAGNOSIS — R06.83 SNORING: ICD-10-CM

## 2024-04-08 DIAGNOSIS — E66.01 MORBID OBESITY (H): ICD-10-CM

## 2024-04-08 DIAGNOSIS — G47.33 OBSTRUCTIVE SLEEP APNEA: Primary | ICD-10-CM

## 2024-04-08 DIAGNOSIS — I10 BENIGN ESSENTIAL HYPERTENSION: ICD-10-CM

## 2024-04-08 PROCEDURE — 99214 OFFICE O/P EST MOD 30 MIN: CPT | Performed by: STUDENT IN AN ORGANIZED HEALTH CARE EDUCATION/TRAINING PROGRAM

## 2024-04-08 ASSESSMENT — SLEEP AND FATIGUE QUESTIONNAIRES
HOW LIKELY ARE YOU TO NOD OFF OR FALL ASLEEP WHILE SITTING INACTIVE IN A PUBLIC PLACE: WOULD NEVER DOZE
HOW LIKELY ARE YOU TO NOD OFF OR FALL ASLEEP WHILE LYING DOWN TO REST IN THE AFTERNOON WHEN CIRCUMSTANCES PERMIT: SLIGHT CHANCE OF DOZING
HOW LIKELY ARE YOU TO NOD OFF OR FALL ASLEEP WHEN YOU ARE A PASSENGER IN A CAR FOR AN HOUR WITHOUT A BREAK: WOULD NEVER DOZE
HOW LIKELY ARE YOU TO NOD OFF OR FALL ASLEEP IN A CAR, WHILE STOPPED FOR A FEW MINUTES IN TRAFFIC: WOULD NEVER DOZE
HOW LIKELY ARE YOU TO NOD OFF OR FALL ASLEEP WHILE WATCHING TV: SLIGHT CHANCE OF DOZING
HOW LIKELY ARE YOU TO NOD OFF OR FALL ASLEEP WHILE SITTING AND READING: SLIGHT CHANCE OF DOZING
HOW LIKELY ARE YOU TO NOD OFF OR FALL ASLEEP WHILE SITTING AND TALKING TO SOMEONE: WOULD NEVER DOZE
HOW LIKELY ARE YOU TO NOD OFF OR FALL ASLEEP WHILE SITTING QUIETLY AFTER LUNCH WITHOUT ALCOHOL: WOULD NEVER DOZE

## 2024-04-08 NOTE — ASSESSMENT & PLAN NOTE
STOP BANG score is 6/8. Patient likely has sleep apnea based on clinical history of EDS (ESS 3/24), HTN, BMI, age, neck circumference, gender, insomnia (NAKITA 17/28), nocturia, snore arousals, and morning dry mouth..   Obstructive sleep apnea reviewed. Complications of Untreated Sleep Apnea Reviewed.  HST/ Polysomnography reviewed. Information provided regarding treatment options for ANJU.  Recommend in-lab sleep study to evaluate for sleep disordered breathing due to elevated risk for ANJU

## 2024-04-08 NOTE — NURSING NOTE
"Chief Complaint   Patient presents with    Consult       Initial BP (!) 152/98   Pulse 72   Ht 1.753 m (5' 9\")   Wt 122.8 kg (270 lb 12.8 oz)   SpO2 98%   BMI 39.99 kg/m   Estimated body mass index is 39.99 kg/m  as calculated from the following:    Height as of this encounter: 1.753 m (5' 9\").    Weight as of this encounter: 122.8 kg (270 lb 12.8 oz).    Medication Reconciliation: complete    Neck circumference: 18.5 inches / 47 centimeters.    DME: n/a     PSG appt scheduled. Results via [facility].     Jolene Yuan MA  "

## 2024-04-08 NOTE — PROGRESS NOTES
[facility]  Consultation Note    Name: Sharad Yi MRN#: [id number]   Age: 74 year old YOB: 1950     Date of Consultation: 04/08/24  Consultation is requested by: Rae Phillip  Primary care provider: Lenoardo Perez MD    History of Present Illness:   Sharad Yi is a 74 year old male patient with HTN, HLD, B/L PE on AC, BPH, and obesity  He is sent by Rae Phillip for a sleep consultation regarding Consult  .    Sharad Yi main reason for visit: Trouble sleeping  Patient states problem(s) started: 25 years ago  Sharad Yi's goals for this visit: Sleep better    He has had a previous sleep study about 20 years ago. Important findings: Sleep study unavailable.    CPAP: No    Assessment and Plan:     Problem List Items Addressed This Visit          Respiratory    Obstructive sleep apnea - Primary     STOP BANG score is 6/8. Patient likely has sleep apnea based on clinical history of EDS (ESS 3/24), HTN, BMI, age, neck circumference, gender, insomnia (NAKITA 17/28), nocturia, snore arousals, and morning dry mouth..   Obstructive sleep apnea reviewed. Complications of Untreated Sleep Apnea Reviewed.  HST/ Polysomnography reviewed. Information provided regarding treatment options for ANJU.  Recommend in-lab sleep study to evaluate for sleep disordered breathing due to elevated risk for ANJU            Relevant Orders    Comprehensive Sleep Study       Digestive    Morbid obesity (H)       Circulatory    Benign essential hypertension     Other Visit Diagnoses       Snoring                SLEEP-WAKE SCHEDULE:   Work/School Days: Patient goes to school/work: No   Usually gets into bed at 10:00 pm  Takes patient about 30 minutes to fall asleep  Has trouble falling asleep 2 nights per week  Wakes up in the middle of the night 2-3 times.  Wakes up due to Pain;Use the bathroom  He has trouble falling back asleep 2 times a week.   It usually takes 20 minutes - 2 hrs to get  back to sleep  Patient is usually up at 7:00 am  Uses alarm: No  Sleep Inertia: No    Weekends/Non-work Days/All Other Days:  Usually gets into bed at 10:00pm   Takes patient about 30 minutes to fall asleep  Patient is usually up at 7:00 am  Uses alarm: No    Sleep Need  Patient gets  8 Hours sleep on average   Patient thinks He needs about 8 Hours sleep    Sharad Yi prefers to sleep in this position(s): Side   Patient states they do the following activities in bed: Read    Naps  Patient takes a purposeful nap 5 times a week and naps are usually 30 Minutes in duration  He feels better after a nap: No  He dozes off unintentionally 5 days per week  Patient has had a driving accident or near-miss due to sleepiness/drowsiness: No      SLEEP DISRUPTIONS:  Breathing/Snoring  Patient snores:Yes  Other people complain about His snoring: Yes  Patient has been told He stops breathing in His sleep:No  He has issues with the following: Morning mouth dryness;Stuffy nose when you wake up;Getting up to urinate more than once    Movement:  Patient gets pain, discomfort, with an urge to move:  Yes  It happens when He is resting:  Yes  It happens more at night:  No  Patient has been told Sharad Yi kicks His legs at night:  Yes     Behaviors in Sleep:  Sharad Yi has experienced the following behaviors while sleeping:    He has experienced sudden muscle weakness during the day: No       Is there anything else you would like your sleep provider to know:        CAFFEINE AND OTHER SUBSTANCES:  Patient consumes caffeinated beverages per day:  1 - 2  Last caffeine use is usually: 9:00 am  List of any prescribed or over the counter stimulants that patient takes: Tylenol PM, Melatonin  List of any prescribed or over the counter sleep medication patient takes: Tylenol PM. Melatonin  List of previous sleep medications that patient has tried: Same  Patient drinks alcohol to help them sleep: No  Patient drinks alcohol near  bedtime: No    Family History:  Patient has a family member been diagnosed with a sleep disorder: No            SCALES:  EPWORTH SLEEPINESS SCALE       4/8/2024    10:43 AM    Blanchard Sleepiness Scale ( FLAKO Oliveira  2790-1954<br>ESS - [place]/English - Final version - 21 Nov 07 - [facility] [place].)   Sitting and reading Slight chance of dozing   Watching TV Slight chance of dozing   Sitting, inactive in a public place (e.g. a theatre or a meeting) Would never doze   As a passenger in a car for an hour without a break Would never doze   Lying down to rest in the afternoon when circumstances permit Slight chance of dozing   Sitting and talking to someone Would never doze   Sitting quietly after a lunch without alcohol Would never doze   In a car, while stopped for a few minutes in traffic Would never doze   Blanchard Score (MC) 3   Blanchard Score (Sleep) 3       INSOMNIA SEVERITY INDEX (NAKITA)        4/6/2024    10:18 AM   Insomnia Severity Index (NAKITA)   Difficulty falling asleep 2   Difficulty staying asleep 3   Problems waking up too early 1   How SATISFIED/DISSATISFIED are you with your CURRENT sleep pattern? 3   How NOTICEABLE to others do you think your sleep problem is in terms of impairing the quality of your life? 2   How WORRIED/DISTRESSED are you about your current sleep problem? 3   To what extent do you consider your sleep problem to INTERFERE with your daily functioning (e.g. daytime fatigue, mood, ability to function at work/daily chores, concentration, memory, mood, etc.) CURRENTLY? 3   NAKITA Total Score 17    Guidelines for Scoring/Interpretation:  Total score categories:  0-7 = No clinically significant insomnia   8-14 = Subthreshold insomnia   15-21 = Clinical insomnia (moderate severity)  22-28 = Clinical insomnia (severe)  Used via courtesy of www.[facility] [place] [facility].va.gov with permission from Garo aKur PhD., Univers\A [facility] [place] [facility]\"" [place]      STOP BANG   ANJU High Risk            Total Score: 6    ANJU: Often tired     "ANJU: Hypertension    ANJU: BMI over 35 kg/m2    ANJU: Over 50 ys old    ANJU: Neck Circum >16 in    ANJU: Male          GAD7       No data to display                  CAGE-AID       No data to display              CAGE-AID reprinted with permission from the [place] Medical Journal, JASON Brown. and SESAR Santiago, \"Conjoint screening questionnaires for alcohol and drug abuse\" [place] Medical Journal 94: 135-140, 1995.      PATIENT HEALTH QUESTIONNAIRE-9 (PHQ - 9)      9/18/2020     1:00 PM   PHQ-9 ([employer])   1.  Little interest or pleasure in doing things 0   2.  Feeling down, depressed, or hopeless 0     Developed by Sophia Bourgeois, Bhavana Constantino, John Naylor and colleagues, with an educational anisa from [employer]. No permission required to reproduce, translate, display or distribute.      Allergies:    No Known Allergies    Medications:    Current Outpatient Medications   Medication Sig Dispense Refill    apixaban ANTICOAGULANT (ELIQUIS) 5 MG tablet Take 1 tablet (5 mg) by mouth 2 times daily 180 tablet 3    atorvastatin (LIPITOR) 80 MG tablet Take 1 tablet (80 mg) by mouth at bedtime Increase dose since LDL is more elevated this year. Recheck in one year 90 tablet 3    betamethasone valerate (VALISONE) 0.1 % external cream Apply topically 2 times daily as needed (for knee pain)      Cholecalciferol (VITAMIN D3 PO) Take 400 Units by mouth daily      diphenhydrAMINE-acetaminophen (TYLENOL PM)  MG tablet Take 1 tablet by mouth nightly as needed for sleep      doxazosin (CARDURA) 4 MG tablet TAKE 1 TABLET(4 MG) BY MOUTH AT BEDTIME Strength: 4 mg 90 tablet 3    finasteride (PROSCAR) 5 MG tablet Take 1 tablet (5 mg) by mouth daily 30 tablet 1    metoprolol succinate ER (TOPROL XL) 50 MG 24 hr tablet Take 1 tablet (50 mg) by mouth at bedtime 90 tablet 3       Problem List:  Patient Active Problem List    Diagnosis Date Noted    Obstructive sleep apnea 04/08/2024     Priority: Medium    History of " pulmonary embolism 12/07/2023     Priority: Medium    Family history of prostate cancer in father 12/07/2023     Priority: Medium    Enlarged prostate 12/07/2023     Priority: Medium    History of colonic polyps 10/20/2023     Priority: Medium    Gallstones 07/08/2023     Priority: Medium    Acute pulmonary embolism (H) 07/08/2023     Priority: Medium     Diagnosed July 2023      Morbid obesity (H)      Priority: Medium     Formatting of this note might be different from the original.  Created by Conversion      Nonalcoholic fatty liver 09/18/2019     Priority: Medium    Hemorrhoids 04/25/2019     Priority: Medium    Hyperlipidemia      Priority: Medium     Created by Conversion      Formatting of this note might be different from the original.  Created by Conversion      Onychomycosis      Priority: Medium     Created by Conversion      Formatting of this note might be different from the original.  Created by Conversion      Benign essential hypertension      Priority: Medium     Created by Conversion      Formatting of this note might be different from the original.  Created by Conversion      Sebaceous cyst      Priority: Medium     Created by Conversion  [facility] Annotation: Apr 28 2014  9:18AM Jase Pittman: Inflamed   slightly.      Formatting of this note might be different from the original.  Created by Conversion  [facility] Annotation: Apr 28 2014  9:18AM - BelneClayma: Inflamed   slightly.      Elevated PSA 03/02/2015     Priority: Medium    Kidney cysts 02/23/2015     Priority: Medium        Past Medical/Surgical History:  Past Medical History:   Diagnosis Date    BPH (benign prostatic hyperplasia)     Chronic knee pain      Past Surgical History:   Procedure Laterality Date    MENISCECTOMY Right     ZZC APPENDECTOMY      Description: Appendectomy;  Recorded: 02/05/2013;       Social History:  Social History     Socioeconomic History    Marital status:      Spouse name: Not on  file    Number of children: 1    Years of education: Not on file    Highest education level: Not on file   Occupational History    Occupation: DNR     Comment: Retired   Tobacco Use    Smoking status: Former     Packs/day: .5     Types: Cigarettes     Start date: 1973     Quit date: 1975     Years since quittin.7     Passive exposure: Past    Smokeless tobacco: Never    Tobacco comments:     In college   Vaping Use    Vaping Use: Never used   Substance and Sexual Activity    Alcohol use: Not Currently     Alcohol/week: 10.0 standard drinks of alcohol     Types: 10 Standard drinks or equivalent per week     Comment: not everyday, occassional    Drug use: No    Sexual activity: Yes     Partners: Female   Other Topics Concern    Not on file   Social History Narrative    Not on file     Social Determinants of Health     Financial Resource Strain: Low Risk  (2024)    Financial Resource Strain     Within the past 12 months, have you or your family members you live with been unable to get utilities (heat, electricity) when it was really needed?: No   Food Insecurity: Low Risk  (2024)    Food Insecurity     Within the past 12 months, did you worry that your food would run out before you got money to buy more?: No     Within the past 12 months, did the food you bought just not last and you didn t have money to get more?: No   Transportation Needs: Low Risk  (2024)    Transportation Needs     Within the past 12 months, has lack of transportation kept you from medical appointments, getting your medicines, non-medical meetings or appointments, work, or from getting things that you need?: No   Physical Activity: Not on file   Stress: Not on file   Social Connections: Not on file   Interpersonal Safety: Low Risk  (2023)    Interpersonal Safety     Do you feel physically and emotionally safe where you currently live?: Yes     Within the past 12 months, have you been hit, slapped, kicked or otherwise  "physically hurt by someone?: No     Within the past 12 months, have you been humiliated or emotionally abused in other ways by your partner or ex-partner?: No   Housing Stability: Low Risk  (2/2/2024)    Housing Stability     Do you have housing? : Yes     Are you worried about losing your housing?: No       Family History:  Family History   Problem Relation Age of Onset    No Known Problems Mother         pacemaker    Prostate Cancer Father 57    Heart Disease Father 57    Diabetes Father     Deep Vein Thrombosis (DVT) Sister     No Known Problems Sister     No Known Problems Sister     No Known Problems Brother     No Known Problems Brother     No Known Problems Brother     No Known Problems Brother        Review of Systems:   A complete review of systems reviewed by me is negative with the exeption of what has been mentioned in the history of present illness.       04/06/24 1032   In the last TWO WEEKS have you experienced any of the following symptoms?   Fevers No   Night Sweats No   Weight Gain No   Pain at Night Yes   Double Vision No   Changes in Vision Yes   Difficulty Breathing through Nose No   Sore Throat in Morning No   Dry Mouth in the Morning Yes   Shortness of Breath Lying Flat No   Shortness of Breath With Activity Yes   Awakening with Shortness of Breath No   Increased Cough No   Heart Racing at Night No   Swelling in Feet or Legs No   Diarrhea at Night No   Heartburn at Night No   Urinating More than Once at Night Yes   Losing Control of Urine at Night No   Joint Pains at Night Yes   Headaches in Morning No   Weakness in Arms or Legs No   Depressed Mood No   Anxiety No         Physical Exam:   Vitals: BP (!) 152/98   Pulse 72   Ht 1.753 m (5' 9\")   Wt 122.8 kg (270 lb 12.8 oz)   SpO2 98%   BMI 39.99 kg/m    BMI= Body mass index is 39.99 kg/m .  Neck Cir (cm): 47 cm  GENERAL: alert, no distress, and obese  EYES: Eyes grossly normal to inspection.  No discharge or erythema, or obvious " "scleral/conjunctival abnormalities.  RESP: No audible wheeze, cough, or visible cyanosis.    SKIN: Visible skin clear. No significant rash, abnormal pigmentation or lesions.  NEURO: Cranial nerves grossly intact.  Mentation and speech appropriate for age.  PSYCH: Appropriate affect, tone, and pace of words         Data: All pertinent previous laboratory data reviewed     Recent Labs   Lab Test 03/13/24  1010 07/09/23  0407 07/08/23  1003   NA  --  141 139   POTASSIUM  --  4.4 4.2   CHLORIDE  --  106 107   CO2  --  24 22   ANIONGAP  --  11 10   GLC  --  103 116   BUN  --  13 19   CR 1.0 0.86 0.98   BRIANDA  --  9.1 9.0       Recent Labs   Lab Test 07/09/23  0407   WBC 7.5   RBC 4.54   HGB 14.3   HCT 42.3   MCV 93   MCH 31.5   MCHC 33.8   RDW 13.6          Recent Labs   Lab Test 07/08/23  1003   PROTTOTAL 6.9   ALBUMIN 3.6   BILITOTAL 0.9   ALKPHOS 64   AST 18   ALT 23       No results found for: \"TSH\"    No results found for: \"UAMP\", \"UBARB\", \"BENZODIAZEUR\", \"UCANN\", \"UCOC\", \"OPIT\", \"UPCP\"    No results found for: \"IRONSAT\", \"[id number]\", \"CORI\"    No results found for: \"PH\", \"PHARTERIAL\", \"PO2\", \"WN3NLWANDUU\", \"SAT\", \"PCO2\", \"HCO3\", \"BASEEXCESS\", \"SUKHDEEP\", \"BEB\"    @LABRCNTIPR(phv:4,pco2v:4,po2v:4,hco3v:4,rashel:4,o2per:4)@    Echocardiology: No results found for this or any previous visit (from the past 4320 hour(s)).    Chest x-ray:   XR Chest 2 Views 02/07/2024    Narrative  EXAM: XR CHEST 2 VIEWS  LOCATION: [facility]  DATE: 2/7/2024    INDICATION:  Acute cough  COMPARISON: CT topogram 07/08/2023    Impression  IMPRESSION: Thoracic DISH. Limited inspiratory volume. Cardiac size difficult to assess with this degree of inflation. No pulmonary vascular congestion, pneumonia, or pleural effusion seen.      Chest CT:   CT Chest Pulmonary Embolism w Contrast 07/08/2023    Narrative  EXAM: CT CHEST PULMONARY EMBOLISM WITH CONTRAST  LOCATION: [facility]  DATE: " 07/08/2023    INDICATION: Shortness of breath, dyspnea on exertion, elevated d-dimer.  COMPARISON: None.  TECHNIQUE: CT chest pulmonary angiogram during arterial phase injection of IV contrast. Multiplanar reformats and MIP reconstructions were performed. Dose reduction techniques were used.  CONTRAST: 75 mL Isovue 370.    FINDINGS:  ANGIOGRAM CHEST: Prominent right and left pulmonary emboli leading to the distal portions of the main pulmonary arteries and then extending to all lobes of both lungs. Mildly dilated main pulmonary artery root measuring 3.6 cm. RV/LV ratio is 0.9, within  normal limits. Thoracic aorta is negative for dissection.    LUNGS AND PLEURA: No effusion. No dense lobar consolidation. Minimal patchy groundglass opacities at the bilateral lungs..    MEDIASTINUM/AXILLAE: No adenopathy or additional acute abnormality. No fluid collection.    CORONARY ARTERY CALCIFICATION: Moderate.    UPPER ABDOMEN: [Hepatic cysts. No acute upper abdominal abnormality identified. Tiny cholelithiasis.    MUSCULOSKELETAL: Diffuse degenerative changes of the spine.    Impression  IMPRESSION:  1.  Prominent bilateral pulmonary embolism burden leading to the distal portions of the right and left main pulmonary arteries. Mildly dilated main pulmonary artery root. Correlate with any evidence of cardiac strain.  2.  Mild patchy groundglass opacities in both lungs may be mild edema.  3.  Small cholelithiasis.      [Critical Result: Bilateral pulmonary embolism.]    Finding was identified on 07/08/2023 at 12:10 PM CDT.    1.  Dr. Delgadillo was contacted by me on 07/08/2023 at 12:15 PM CDT and verbalized understanding of the critical result.      PFT: Most Recent Breeze Pulmonary Function Testing  No results found     Patient was strongly advised to avoid driving, operating any heavy machinery or other hazardous situations while drowsy or sleepy.  Patient was counseled on the importance of driving while alert, to pull over if  drowsy, or nap before getting into the vehicle if sleepy.      Plan is for Sharad Yi to follow up following PSG.     The above note was dictated using voice recognition software. Although reviewed after completion, some word and grammatical error may remain . Please contact the author for any clarifications.    Total time spent reviewing medical records, history and physical examination, review of previous testing and interpretation as well as documentation on this date, 04/08/24: 30 minutes    Vincent Culver MD on 10/20/2022   [facility]   Floor 1, [address]   [address] [place] [address]   Appointments: [phone] [facility]   Floor 1, [address]   [address] [place] [address]   Appointments: [phone]     CC: Rae Phillip\

## 2024-04-08 NOTE — PATIENT INSTRUCTIONS
"MY INFORMATION ON SLEEP APNEA-  Sharad Yi    DOCTOR : Vincent Culver MD  SLEEP CENTER :      MY CONTACT NUMBER:    [facility]   [phone]  [facility]    Am I having a sleep study at a sleep center?  Make sure you have an appointment for the study before you leave!  [email or web address]/watch?[email or web address]    Am I having a home sleep study?  Watch the video for the device you are using:  /drop off device, Noxturnal T-3: https://www.Ahaaliube.com/watch?v=yGGFBdELGhk  Disposable device sent out require phone/computer application, WatchPAT1: https://www.PetMD.com/watch?v=BCce_vbiwxE  The sleep lab will call you for this appointment   If you wish to reschedule the sleep study or contact the sleep lab scheduling call [phone]        Frequently asked questions:  1. What is Obstructive Sleep Apnea (ANJU)? ANJU is the most common type of sleep apnea. Apnea means, \"without breath.\"  Apnea is most often caused by narrowing or collapse of the upper airway as muscles relax during sleep.   Almost everyone has occasional apneas. Most people with sleep apnea have had brief interruptions at night frequently for many years.  The severity of sleep apnea is related to how frequent and severe the events are.   Apneas are any events where there is no breathing.  Hypopneas are any events where there is shallow breathing. Sleep studies will track and then add all of the apneas and hypopneas into a total and then divided this number by the total time asleep to obtain the apnea hypopnea index(AHI). 0-5 events/per hour = No Sleep Apnea; 5-15 events/per hour = Mild Sleep Apnea; 15-30 events/per hour = Moderate Sleep Apnea; Greater than 30 events/per hour = Severe Sleep Apnea.  Sleep apnea is typically worse during REM sleep due to complete muscle relaxation, including the muscles of the airway. Sleep apnea is also typically worse " during supine(sleeping on your back) sleep due to internal structures more easily falling on the top of the airway and external pressures more easily crushing the airway.  The respiratory disturbance index(RDI) includes apneas, hypopneas, and other respiratory events such as snoring that may disturb your sleep.  2. What are the consequences of ANJU? Symptoms include: feeling sleepy during the day, snoring loudly, gasping or stopping of breathing, trouble sleeping, and occasionally morning headaches or heartburn at night.  Sleepiness can be serious and even increase the risk of falling asleep while driving. Other health consequences may include development of high blood pressure and other cardiovascular disease in persons who are susceptible. Untreated ANJU  can contribute to heart disease, stroke and diabetes.   3. What are the treatment options? In most situations, sleep apnea is a lifelong disease that must be managed with daily therapy. Medications are not effective for sleep apnea and surgery is generally not considered until other therapies have been tried. Your treatment is your choice . Continuous Positive Airway (CPAP) works right away and is the therapy that is effective in nearly everyone. An oral device to hold your jaw forward is usually the next most reliable option. Other options include postioning devices (to keep you off your back), weight loss, and surgery including a tongue pacing device. There is more detail about some of these options below.    Important tips for using CPAP and similar devices   Know your equipment:  CPAP is continuous positive airway pressure that prevents obstructive sleep apnea by keeping the throat from collapsing while you are sleeping. In most cases, the device is  smart  and can slowly self-adjusts if your throat collapses and keeps a record every day of how well you are treated-this information is available to you and your care team.  BPAP is bilevel positive airway  pressure that keeps your throat open and also assists each breath with a pressure boost to maintain adequate breathing.  Special kinds of BPAP are used in patients who have inadequate breathing from lung or heart disease. In most cases, the device is  smart  and can slowly self-adjusts to assist breathing. Like CPAP, the device keeps a record of how well you are treated.  Your mask is your connection to the device. You get to choose what feels most comfortable and the staff will help to make sure if fits. Here: are some examples of the different masks that are available:       Key points to remember on your journey with sleep apnea:  Sleep study.  PAP devices often need to be adjusted during a sleep study to show that they are effective and adjusted right.  Good tips to remember: Try wearing just the mask during a quiet time during the day so your body adapts to wearing it. A humidifier is recommended for comfort in most cases to prevent drying of your nose and throat. Allergy medication from your provider may help you if you are having nasal congestion.  Getting settled-in. It takes more than one night for most of us to get used to wearing a mask. Try wearing just the mask during a quiet time during the day so your body adapts to wearing it. A humidifier is recommended for comfort in most cases. Our team will work with you carefully on the first day and will be in contact within 4 days and again at 2 and 4 weeks for advice and remote device adjustments. Your therapy is evaluated by the device each day.   Use it every night. The more you are able to sleep naturally for 7-8 hours, the more likely you will have good sleep and to prevent health risks or symptoms from sleep apnea. Even if you use it 4 hours it helps. Occasionally all of us are unable to use a medical therapy, in sleep apnea, it is not dangerous to miss one night.   Communicate. Call our skilled team on the number provided on the first day if your visit  for problems that make it difficult to wear the device. Over 2 out of 3 patients can learn to wear the device long-term with help from our team. Remember to call our team or your sleep providers if you are unable to wear the device as we may have other solutions for those who cannot adapt to mask CPAP therapy. It is recommended that you sleep your sleep provider within the first 3 months and yearly after that if you are not having problems.   Take care of your equipment. Make sure you clean your mask and tubing using directions every day and that your filter and mask are replaced as recommended or if they are not working.     BESIDES CPAP, WHAT OTHER THERAPIES ARE THERE?    Positioning Device  Positioning devices are generally used when sleep apnea is mild and only occurs on your back.This example shows a pillow that straps around the waist. It may be appropriate for those whose sleep study shows milder sleep apnea that occurs primarily when lying flat on one's back. Preliminary studies have shown benefit but effectiveness at home may need to be verified by a home sleep test. These devices are generally not covered by medical insurance.    Oral Appliance  What is oral appliance therapy?  An oral appliance device fits on your teeth at night like a retainer used after having braces. The device is made by a specialized dentist and requires several visits over 1-2 months before a manufactured device is made to fit your teeth and is adjusted to prevent your sleep apnea. Once an oral device is working properly, snoring should be improved. A home sleep test may be recommended at that time if to determine whether the sleep apnea is adequately treated.       Some things to remember:  -Oral devices are often, but not always, covered by your medical insurance. Be sure to check with your insurance provider.   -If you are referred for oral therapy, you will be given a list of specialized dentists to consider or you may choose to  visit the Web site of the American Academy of Dental Sleep Medicine  -Oral devices are less likely to work if you have severe sleep apnea or are extremely overweight.     More detailed information  An oral appliance is a small acrylic device that fits over the upper and lower teeth  (similar to a retainer or a mouth guard). This device slightly moves jaw forward, which moves the base of the tongue forward, opens the airway, improves breathing for effective treat snoring and obstructive sleep apnea in perhaps 7 out of 10 people .  The best working devices are custom-made by a dental device  after a mold is made of the teeth 1, 2, 3.  When is an oral appliance indicated?  Oral appliance therapy is recommended as a first-line treatment for patients with primary snoring, mild sleep apnea, and for patients with moderate sleep apnea who prefer appliance therapy to use of CPAP4, 5. Severity of sleep apnea is determined by sleep testing and is based on the number of respiratory events per hour of sleep.   How successful is oral appliance therapy?  The success rate of oral appliance therapy in patients with mild sleep apnea is 75-80% while in patients with moderate sleep apnea it is 50-70%. The chance of success in patients with severe sleep apnea is 40-50%. The research also shows that oral appliances have a beneficial effect on the cardiovascular health of ANJU patients at the same magnitude as CPAP therapy7.  Oral appliances should be a second-line treatment in cases of severe sleep apnea, but if not completely successful then a combination therapy utilizing CPAP plus oral appliance therapy may be effective. Oral appliances tend to be effective in a broad range of patients although studies show that the patients who have the highest success are females, younger patients, those with milder disease, and less severe obesity. 3, 6.   Finding a dentist that practices dental sleep medicine  Specific training is  available through the American Academy of Dental Sleep Medicine for dentists interested in working in the field of sleep. To find a dentist who is educated in the field of sleep and the use of oral appliances, near you, visit the Web site of the American Academy of Dental Sleep Medicine.    References  1. Oneal et al. Objectively measured vs self-reported compliance during oral appliance therapy for sleep-disordered breathing. Chest 2013; 144(5): 0469-8650.  2. Toshia, et al. Objective measurement of compliance during oral appliance therapy for sleep-disordered breathing. Thorax 2013; 68(1): 91-96.  3. Micki et al. Mandibular advancement devices in 620 men and women with ANJU and snoring: tolerability and predictors of treatment success. Chest 2004; 125: 7558-7981.  4. Kris et al. Oral appliances for snoring and ANJU: a review. Sleep 2006; 29: 244-262.  5. Jame, et al. Oral appliance treatment for ANJU: an update. J Clin Sleep Med 2014; 10(2): 215-227.  6. Artur et al. Predictors of OSAH treatment outcome. J Dent Res 2007; 86: 9269-7463.      Weight Loss:    Weight loss is a long-term strategy that may improve sleep apnea in some patients.    Weight management is a personal decision.  If you are interested in exploring weight loss strategies, the following discussion covers the impact on weight loss on sleep apnea and the approaches that may be successful.    Weight loss decreases severity of sleep apnea in most people with obesity. For those with mild obesity who have developed snoring with weight gain, even 15-30 pound weight loss can improve and occasionally eliminate sleep apnea.  Structured and life-long dietary and health habits are necessary to lose weight and keep healthier weight levels.     Though there may be significant health benefits from weight loss, long-term weight loss is very difficult to achieve- studies show success with dietary management in less than 10% of people. In  addition, substantial weight loss may require years of dietary control and may be difficult if patients have severe obesity. In these cases, surgical management may be considered.  Finally, older individuals who have tolerated obesity without health complications may be less likely to benefit from weight loss strategies.    Your BMI is Body mass index is 39.99 kg/m .    Weight management plan: Discussed healthy diet and exercise guidelines    Surgery:    Surgery for obstructive sleep apnea is considered generally only when other therapies fail to work. Surgery may be discussed with you if you are having a difficult time tolerating CPAP and or when there is an abnormal structure that requires surgical correction.  Nose and throat surgeries often enlarge the airway to prevent collapse.  Most of these surgeries create pain for 1-2 weeks and up to half of the most common surgeries are not effective throughout life.  You should carefully discuss the benefits and drawbacks to surgery with your sleep provider and surgeon to determine if it is the best solution for you.   More information  Surgery for ANJU is directed at areas that are responsible for narrowing or complete obstruction of the airway during sleep.  There are a wide range of procedures available to enlarge and/or stabilize the airway to prevent blockage of breathing in the three major areas where it can occur: the palate, tongue, and nasal regions.  Successful surgical treatment depends on the accurate identification of the factors responsible for obstructive sleep apnea in each person.  A personalized approach is required because there is no single treatment that works well for everyone.  Because of anatomic variation, consultation with an examination by a sleep surgeon is a critical first step in determining what surgical options are best for each patient.  In some cases, examination during sedation may be recommended in order to guide the selection of  procedures.  Patients will be counseled about risks and benefits as well as the typical recovery course after surgery. Surgery is typically not a cure for a person s ANJU.  However, surgery will often significantly improve one s ANJU severity (termed  success rate ).  Even in the absence of a cure, surgery will decrease the cardiovascular risk associated with OSA7; improve overall quality of life8 (sleepiness, functionality, sleep quality, etc).      Palate Procedures:  Patients with ANJU often have narrowing of their airway in the region of their tonsils and uvula.  The goals of palate procedures are to widen the airway in this region as well as to help the tissues resist collapse.  Modern palate procedure techniques focus on tissue conservation and soft tissue rearrangement, rather than tissue removal.  Often the uvula is preserved in this procedure. Residual sleep apnea is common in patient after pharyngoplasty with an average reduction in sleep apnea events of 33%2.      Tongue Procedures:  ExamWhile patients are awake, the muscles that surround the throat are active and keep this region open for breathing. These muscles relax during sleep, allowing the tongue and other structures to collapse and block breathing.  There are several different tongue procedures available.  Selection of a tongue base procedure depends on characteristics seen on physical exam.  Generally, procedures are aimed at removing bulky tissues in this area or preventing the back of the tongue from falling back during sleep.  Success rates for tongue surgery range from 50-62%3.    Hypoglossal Nerve Stimulation:  Hypoglossal nerve stimulation has recently received approval from the [place] Food and Drug Administration for the treatment of obstructive sleep apnea.  This is based on research showing that the system was safe and effective in treating sleep apnea6.  Results showed that the median AHI score decreased 68%, from 29.3 to 9.0. This  therapy uses an implant system that senses breathing patterns and delivers mild stimulation to airway muscles, which keeps the airway open during sleep.  The system consists of three fully implanted components: a small generator (similar in size to a pacemaker), a breathing sensor, and a stimulation lead.  Using a small handheld remote, a patient turns the therapy on before bed and off upon awakening.    Candidates for this device must be greater than 22 years of age, have moderate to severe ANJU (AHI between 20-65), BMI less than 32, have tried CPAP/oral appliance without success, and have appropriate upper airway anatomy (determined by a sleep endoscopy performed by Dr. Chavez).    Hypoglossal Nerve Stimulation Pathway:    The sleep surgeon s office will work with the patient through the insurance prior-authorization process (including communications and appeals).    Nasal Procedures:  Nasal obstruction can interfere with nasal breathing during the day and night.  Studies have shown that relief of nasal obstruction can improve the ability of some patients to tolerate positive airway pressure therapy for obstructive sleep apnea1.  Treatment options include medications such as nasal saline, topical corticosteroid and antihistamine sprays, and oral medications such as antihistamines or decongestants. Non-surgical treatments can include external nasal dilators for selected patients. If these are not successful by themselves, surgery can improve the nasal airway either alone or in combination with these other options.      Combination Procedures:  Combination of surgical procedures and other treatments may be recommended, particularly if patients have more than one area of narrowing or persistent positional disease.  The success rate of combination surgery ranges from 66-80%2,3.    References  Greg MEIER. The Role of the Nose in Snoring and Obstructive Sleep Apnoea: An Update.  Eur Arch Otorhinolaryngol. 2011; 268:  1365-73.   [place] SM; Lucille JA; Philip JR; Pallanch JF; Adrienne MB; Carine SG; Leonidas ALFARO. Surgical modifications of the upper airway for obstructive sleep apnea in adults: a systematic review and meta-analysis. SLEEP 2010;33(10):2246-9160. Nigel NORWOOD. Hypopharyngeal surgery in obstructive sleep apnea: an evidence-based medicine review.  Arch Otolaryngol Head Neck Surg. 2006 Feb;132(2):206-13.  Urban YH1, Nate Y, Darshan SHARON. The efficacy of anatomically based multilevel surgery for obstructive sleep apnea. Otolaryngol Head Neck Surg. 2003 Oct;129(4):327-35.  Nigel NORWOOD, Goldberg A. Hypopharyngeal Surgery in Obstructive Sleep Apnea: An Evidence-Based Medicine Review. Arch Otolaryngol Head Neck Surg. 2006 Feb;132(2):206-13.  Margarita GASCA et al. Upper-Airway Stimulation for Obstructive Sleep Apnea.  N Engl J Med. 2014 Jan 9;370(2):139-49.  Beto Y et al. Increased Incidence of Cardiovascular Disease in Middle-aged Men with Obstructive Sleep Apnea. Am J Respir Crit Care Med; 2002 166: 159-165  Saleem EM et al. Studying Life Effects and Effectiveness of Palatopharyngoplasty (SLEEP) study: Subjective Outcomes of Isolated Uvulopalatopharyngoplasty. Otolaryngol Head Neck Surg. 2011; 144: 623-631.

## 2024-04-09 ENCOUNTER — PREP FOR PROCEDURE (OUTPATIENT)
Dept: SURGERY | Facility: CLINIC | Age: 74
End: 2024-04-09
Payer: COMMERCIAL

## 2024-04-10 ENCOUNTER — TELEPHONE (OUTPATIENT)
Dept: UROLOGY | Facility: CLINIC | Age: 74
End: 2024-04-10
Payer: COMMERCIAL

## 2024-04-10 NOTE — TELEPHONE ENCOUNTER
[place] patient    Spoke with: Patient      Date of surgery: Monday Sept 16 2024 with Dr Kennedy       Location: [facility]       Informed patient they will need a adult : YES      Pre op with provider: Patient will schedule at the [facility]      H&P Scheduled in PAC- NA        Pre procedure covid :Not req      Additional imaging: NA        Surgery Packet : Sent via [employer]      Additional comments: Please call for surgery teaching and schedule 6-8 weeks with ALAN Kennedy and ASIYA in [place]

## 2024-06-27 ENCOUNTER — TRANSFERRED RECORDS (OUTPATIENT)
Dept: HEALTH INFORMATION MANAGEMENT | Facility: CLINIC | Age: 74
End: 2024-06-27
Payer: COMMERCIAL

## 2024-08-20 ENCOUNTER — TELEPHONE (OUTPATIENT)
Dept: UROLOGY | Facility: CLINIC | Age: 74
End: 2024-08-20
Payer: COMMERCIAL

## 2024-08-20 NOTE — TELEPHONE ENCOUNTER
Pre Op Teaching Flowsheet       Pre and Post op Patient Education  Relevant Diagnosis:  BPH  Teaching Topic:  Pre and post op teaching  Person Involved in teaching:  patient    Patient demonstrates understanding of the following:  Date and time of surgery:  Sept 16th, 2024  Location of surgery:  [facility]  History and Physical and any other testing necessary prior to surgery: Yes  Required time line for completion of History and Physical and any pre-op testing: Yes    NPO Guidelines: NPO after midnight   Do not eat anything after midnight (12 a.m.).     If you have questions about whether or not to  take certain medicines, ask your doctor.  Failure to follow these directions will cause a delay  in your surgery.    The morning of your surgery:   If you need to take pills, take them with a sip of  water.    Pre-op showering/scrub information with PCMX Soap: Yes  Medications to take the day of surgery:  Per PCP  Blood thinner medications discussed and when to stop (if applicable):  Yes  Diabetes medication management (if applicable):  Patient to discuss with Primary Care Provider  Discussed pain control after surgery: to discuss post op  Infection Prevention: Patient demonstrates understanding of the following:  Patient instructed on hand hygiene:  Yes  Surgical procedure site care taught: Yes  Signs and symptoms of infection taught:  Yes  Wound care will be taught at the time of discharge.  Central venous catheter care will be taught at the time of discharge (if applicable).    Motivation Level:  Asks Questions: Yes  Eager to Learn:  Yes  Cooperative: Yes  Receptive (willing/able to accept information):  Yes    If you have any questions, please call:  Preoperative Assessment Center (PAC) Registered Nurse: [phone], or [facility] and [place] [facility]: [phone]    Post-op follow-up:  Discussed how to contact the hospital, nurse, and clinic scheduling staff if  necessary.    Instructional materials used/given/mailed:  [facility] Booklet, post op teaching sheet, Map, Soap, and arrival/location information.    Surgical instructions mailed to patient Yes.  Reny FLORES RN BSN PHN  Specialty Clinics

## 2024-08-21 ASSESSMENT — SLEEP AND FATIGUE QUESTIONNAIRES
HOW LIKELY ARE YOU TO NOD OFF OR FALL ASLEEP WHILE SITTING QUIETLY AFTER LUNCH WITHOUT ALCOHOL: SLIGHT CHANCE OF DOZING
HOW LIKELY ARE YOU TO NOD OFF OR FALL ASLEEP WHEN YOU ARE A PASSENGER IN A CAR FOR AN HOUR WITHOUT A BREAK: SLIGHT CHANCE OF DOZING
HOW LIKELY ARE YOU TO NOD OFF OR FALL ASLEEP IN A CAR, WHILE STOPPED FOR A FEW MINUTES IN TRAFFIC: WOULD NEVER DOZE
HOW LIKELY ARE YOU TO NOD OFF OR FALL ASLEEP WHILE SITTING AND TALKING TO SOMEONE: WOULD NEVER DOZE
HOW LIKELY ARE YOU TO NOD OFF OR FALL ASLEEP WHILE WATCHING TV: SLIGHT CHANCE OF DOZING
HOW LIKELY ARE YOU TO NOD OFF OR FALL ASLEEP WHILE LYING DOWN TO REST IN THE AFTERNOON WHEN CIRCUMSTANCES PERMIT: MODERATE CHANCE OF DOZING
HOW LIKELY ARE YOU TO NOD OFF OR FALL ASLEEP WHILE SITTING INACTIVE IN A PUBLIC PLACE: WOULD NEVER DOZE
HOW LIKELY ARE YOU TO NOD OFF OR FALL ASLEEP WHILE SITTING AND READING: SLIGHT CHANCE OF DOZING

## 2024-08-26 ENCOUNTER — THERAPY VISIT (OUTPATIENT)
Dept: SLEEP MEDICINE | Facility: CLINIC | Age: 74
End: 2024-08-26
Payer: COMMERCIAL

## 2024-08-26 DIAGNOSIS — G47.33 OBSTRUCTIVE SLEEP APNEA: ICD-10-CM

## 2024-08-26 PROCEDURE — 95810 POLYSOM 6/> YRS 4/> PARAM: CPT | Performed by: STUDENT IN AN ORGANIZED HEALTH CARE EDUCATION/TRAINING PROGRAM

## 2024-09-08 LAB — SLPCOMP: NORMAL

## 2024-09-08 NOTE — PROCEDURES
" SLEEP STUDY INTERPRETATION  DIAGNOSTIC POLYSOMNOGRAPHY REPORT      Patient: MAINOR SANTOS  YOB: 1950  Study Date: 8/26/2024  MRN: [id number]  Referring Provider: CHOLO Phillip CNP, Jennifer  Ordering Provider: MD Culver Akinbolaji    Indications for Polysomnography: The patient is a 74 year old Male who is 5' 9\" and weighs 270.0 lbs. His BMI is 40.0, [place] sleepiness scale 3 and neck circumference is 46 cm. Relevant medical history includes HTN, HLD, B/L PE on AC, BPH, and obesity. A diagnostic polysomnogram was performed to evaluate for sleep apnea/PLMS/RLS/RBD/S-S/CSA/hypoventilation/hypoxemia.    Polysomnogram Data: A full night polysomnogram recorded the standard physiologic parameters including EEG, EOG, EMG, ECG, nasal and oral airflow. Respiratory parameters of chest and abdominal movements were recorded with respiratory inductance plethysmography. Oxygen saturation was recorded by pulse oximetry. Hypopnea scoring rule used: 1B 4%.    Sleep Architecture: Sleep was fragmented with an increased arousal index. Sleep efficiency was decreased with a prolonged sleep latency and a prolonged wake after sleep onset. Stage N3 sleep was not observed; however, all other stages of sleep were observed.   The total recording time of the polysomnogram was 407.1 minutes. The total sleep time was 212.0 minutes. Sleep latency was increased at 33.1 minutes without the use of a sleep aid. REM latency was 291.0 minutes. Arousal index was increased at 95.1 arousals per hour. Sleep efficiency was decreased at 52.1%. Wake after sleep onset was 162.0 minutes. The patient spent 48.1% of total sleep time in Stage N1, 40.8% in Stage N2, 0.0% in Stage N3, and 11.1% in REM. Time in REM supine was - minutes.    Respiration: Severe obstructive sleep apnea and sleep associated hypoxemia were observed.   Events ? The polysomnogram revealed a presence of 76 obstructive, 35 central, and 12 mixed apneas resulting in an " apnea index of 34.8 events per hour. There were 130 obstructive hypopneas and - central hypopneas resulting in an obstructive hypopnea index of 36.8 and central hypopnea index of - events per hour. The combined apnea/hypopnea index was 71.6 events per hour (central apnea/hypopnea index was 9.9 events per hour). The REM AHI was 81.7 events per hour. The supine AHI was - events per hour. The RERA index was 4.2 events per hour.  The RDI was 75.8 events per hour.  Snoring - was reported as moderate/intermittent.  Respiratory rate and pattern - was notable for normal respiratory rate and pattern.  Sustained Sleep Associated Hypoventilation - Transcutaneous carbon dioxide monitoring was not used.  Sleep Associated Hypoxemia - (Greater than 5 minutes O2 sat at or below 88%) was present. Baseline oxygen saturation was 91.6%. Lowest oxygen saturation was 75.0%. Time spent less than or equal to 88% was 25.8 minutes. Time spent less than or equal to 89% was 45.5 minutes.    Movement Activity: No abnormal movements or behaviors were observed.   Periodic Limb Activity - There were 6 PLMs during the entire study. The PLM index was 1.7 movements per hour. The PLM Arousal Index was 0.6 per hour.  REM EMG Activity - Excessive transient/sustained muscle activity was not present.  Nocturnal Behavior - Abnormal sleep related behaviors were not noted during/arising out of NREM / REM sleep.  Bruxism - None apparent.    Cardiac Summary: Normal sinus rhythm  The average pulse rate was 66.0 bpm. The minimum pulse rate was 53.0 bpm while the maximum pulse rate was 92.0 bpm.  Arrhythmias were not noted.        Assessment:   Sleep Architecture: Sleep was fragmented with an increased arousal index. Sleep efficiency was decreased with a prolonged sleep latency and a prolonged wake after sleep onset. Stage N3 sleep was not observed; however, all other stages of sleep were observed.   Respiration: Severe obstructive sleep apnea and sleep  associated hypoxemia were observed.   Movement Activity: No abnormal movements or behaviors were observed.   Cardiac Summary: Normal sinus rhythm      Recommendations:  Recommend repeat polysomnography with full night titration of positive airway pressure therapy for the control of sleep disordered breathing.  Based on the presence of severe obstructive sleep apnea and excessive daytime sleepiness, treatment could be empirically initiated with Auto?titrating PAP therapy with a range of 07 to 17 cmH2O. Recommend clinical follow up with sleep management team.  Advice regarding the risks of drowsy driving.  Suggest optimizing sleep schedule and avoiding sleep deprivation.  Weight management (if BMI > 30).    Diagnostic Codes:   Obstructive Sleep Apnea G47.33  Sleep Hypoxemia G47.36     8/26/2024 [facility] Study (270.0 lbs) - AHI 71.6, RDI 75.8, Supine AHI -, REM AHI 81.7, Low O2 75.0%, Time Spent ?88% 25.8 minutes / Time Spent ?89% 45.5 minutes.    Attending MD: PSG was personally reviewed in detail with the Sleep Medicine Fellow.  The above interpretation reflects our joint assessment and recommendations.   _____________________________________   Electronically Signed By: Vincent Culver MD 09/08/2024

## 2024-09-09 ENCOUNTER — OFFICE VISIT (OUTPATIENT)
Dept: FAMILY MEDICINE | Facility: CLINIC | Age: 74
End: 2024-09-09
Payer: COMMERCIAL

## 2024-09-09 ENCOUNTER — TELEPHONE (OUTPATIENT)
Dept: UROLOGY | Facility: CLINIC | Age: 74
End: 2024-09-09

## 2024-09-09 VITALS
DIASTOLIC BLOOD PRESSURE: 89 MMHG | HEART RATE: 70 BPM | WEIGHT: 268 LBS | OXYGEN SATURATION: 98 % | BODY MASS INDEX: 39.69 KG/M2 | SYSTOLIC BLOOD PRESSURE: 137 MMHG | HEIGHT: 69 IN | RESPIRATION RATE: 14 BRPM | TEMPERATURE: 97.8 F

## 2024-09-09 DIAGNOSIS — E66.01 MORBID OBESITY (H): ICD-10-CM

## 2024-09-09 DIAGNOSIS — R31.0 GROSS HEMATURIA: ICD-10-CM

## 2024-09-09 DIAGNOSIS — E78.5 HYPERLIPIDEMIA, UNSPECIFIED HYPERLIPIDEMIA TYPE: ICD-10-CM

## 2024-09-09 DIAGNOSIS — R97.20 ELEVATED PSA: ICD-10-CM

## 2024-09-09 DIAGNOSIS — G47.33 OBSTRUCTIVE SLEEP APNEA: ICD-10-CM

## 2024-09-09 DIAGNOSIS — N40.0 ENLARGED PROSTATE: ICD-10-CM

## 2024-09-09 DIAGNOSIS — N20.0 KIDNEY STONE: ICD-10-CM

## 2024-09-09 DIAGNOSIS — I10 BENIGN ESSENTIAL HYPERTENSION: ICD-10-CM

## 2024-09-09 DIAGNOSIS — Z86.711 HISTORY OF PULMONARY EMBOLISM: ICD-10-CM

## 2024-09-09 DIAGNOSIS — N40.1 BENIGN PROSTATIC HYPERPLASIA WITH INCOMPLETE BLADDER EMPTYING: ICD-10-CM

## 2024-09-09 DIAGNOSIS — R39.14 BENIGN PROSTATIC HYPERPLASIA WITH INCOMPLETE BLADDER EMPTYING: ICD-10-CM

## 2024-09-09 DIAGNOSIS — Z01.818 PREOP GENERAL PHYSICAL EXAM: Primary | ICD-10-CM

## 2024-09-09 LAB
ANION GAP SERPL CALCULATED.3IONS-SCNC: 8 MMOL/L (ref 7–15)
BUN SERPL-MCNC: 17.8 MG/DL (ref 8–23)
CALCIUM SERPL-MCNC: 9.2 MG/DL (ref 8.8–10.4)
CHLORIDE SERPL-SCNC: 106 MMOL/L (ref 98–107)
CHOLEST SERPL-MCNC: 166 MG/DL
CREAT SERPL-MCNC: 0.97 MG/DL (ref 0.67–1.17)
EGFRCR SERPLBLD CKD-EPI 2021: 81 ML/MIN/1.73M2
ERYTHROCYTE [DISTWIDTH] IN BLOOD BY AUTOMATED COUNT: 13.7 % (ref 10–15)
FASTING STATUS PATIENT QL REPORTED: ABNORMAL
FASTING STATUS PATIENT QL REPORTED: NORMAL
GLUCOSE SERPL-MCNC: 109 MG/DL (ref 70–99)
HCO3 SERPL-SCNC: 26 MMOL/L (ref 22–29)
HCT VFR BLD AUTO: 42 % (ref 40–53)
HDLC SERPL-MCNC: 49 MG/DL
HGB BLD-MCNC: 14 G/DL (ref 13.3–17.7)
LDLC SERPL CALC-MCNC: 88 MG/DL
MCH RBC QN AUTO: 32 PG (ref 26.5–33)
MCHC RBC AUTO-ENTMCNC: 33.3 G/DL (ref 31.5–36.5)
MCV RBC AUTO: 96 FL (ref 78–100)
NONHDLC SERPL-MCNC: 117 MG/DL
PLATELET # BLD AUTO: 206 10E3/UL (ref 150–450)
POTASSIUM SERPL-SCNC: 4.5 MMOL/L (ref 3.4–5.3)
RBC # BLD AUTO: 4.38 10E6/UL (ref 4.4–5.9)
SODIUM SERPL-SCNC: 140 MMOL/L (ref 135–145)
TRIGL SERPL-MCNC: 147 MG/DL
WBC # BLD AUTO: 5.5 10E3/UL (ref 4–11)

## 2024-09-09 PROCEDURE — G0008 ADMIN INFLUENZA VIRUS VAC: HCPCS | Performed by: NURSE PRACTITIONER

## 2024-09-09 PROCEDURE — 93010 ELECTROCARDIOGRAM REPORT: CPT | Performed by: GENERAL ACUTE CARE HOSPITAL

## 2024-09-09 PROCEDURE — 93005 ELECTROCARDIOGRAM TRACING: CPT | Performed by: NURSE PRACTITIONER

## 2024-09-09 PROCEDURE — 85027 COMPLETE CBC AUTOMATED: CPT | Performed by: NURSE PRACTITIONER

## 2024-09-09 PROCEDURE — 87086 URINE CULTURE/COLONY COUNT: CPT | Performed by: NURSE PRACTITIONER

## 2024-09-09 PROCEDURE — 90662 IIV NO PRSV INCREASED AG IM: CPT | Performed by: NURSE PRACTITIONER

## 2024-09-09 PROCEDURE — 36415 COLL VENOUS BLD VENIPUNCTURE: CPT | Performed by: NURSE PRACTITIONER

## 2024-09-09 PROCEDURE — 99214 OFFICE O/P EST MOD 30 MIN: CPT | Mod: 25 | Performed by: NURSE PRACTITIONER

## 2024-09-09 PROCEDURE — 87088 URINE BACTERIA CULTURE: CPT | Performed by: NURSE PRACTITIONER

## 2024-09-09 PROCEDURE — 80061 LIPID PANEL: CPT | Performed by: NURSE PRACTITIONER

## 2024-09-09 PROCEDURE — 80048 BASIC METABOLIC PNL TOTAL CA: CPT | Performed by: NURSE PRACTITIONER

## 2024-09-09 PROCEDURE — 87076 CULTURE ANAEROBE IDENT EACH: CPT | Performed by: NURSE PRACTITIONER

## 2024-09-09 NOTE — TELEPHONE ENCOUNTER
Reason for Call:  Other Soap    Detailed comments: Pt calling because one of his antiseptic surgical soap ripped open and is wondering if he can get another one mailed to him.     Phone Number Patient can be reached at: Cell number on file:    Telephone Information:   Mobile [phone]       Best Time:     Can we leave a detailed message on this number? YES    Call taken on 9/9/2024 at 3:11 PM by Ella South MA

## 2024-09-09 NOTE — H&P (VIEW-ONLY)
Preoperative Evaluation  [facility]  [address] [address]  Phone: [phone]  Fax: [phone]  Primary Provider: Leonardo Perez MD  Pre-op Performing Provider: CHOLO Gasca CNP  Sep 9, 2024             9/5/2024   Surgical Information   What procedure is being done? Urine test and pre op visit   Facility or Hospital where procedure/surgery will be performed: [facility]   Who is doing the procedure / surgery? Dasia Kennedy   Date of surgery / procedure: 09/16/2024   Time of surgery / procedure: 7:45  am   Where do you plan to recover after surgery? at a TCU (Transitional Care Unit)        Fax number for surgical facility: Note does not need to be faxed, will be available electronically in Epic.    Assessment & Plan     The proposed surgical procedure is considered INTERMEDIATE risk.    Benign essential hypertension  **ECHO reviewed from last year. Appears stable from prior test. Edema noted to lower legs around ankles, patient stated this was chronic. Goal for BP is ideally below 130/88, he will follow up with PCP in December. I suggested weight loss and low salt diet and this was discussed.   - EKG 12-lead, tracing only    Morbid obesity (H)  **weight loss discussed.     History of pulmonary embolism  **    Enlarged prostate  **    Elevated PSA  **    Obstructive sleep apnea  **no CPAP machine yet.     Preop general physical exam  **  - CBC with platelets  - Basic metabolic panel  - CBC with platelets  - Basic metabolic panel    Hyperlipidemia, unspecified hyperlipidemia type  **lipids improved with higher statin dose. Low saturated fat diet recommended and regular exercise.   - Lipid Profile (Chol, Trig, HDL, LDL calc)  - Lipid Profile (Chol, Trig, HDL, LDL calc)    Kidney stone  **    Gross hematuria  **  - Urine Culture    Benign prostatic hyperplasia with incomplete bladder emptying  **  - Urine Culture        Risks and  Recommendations  The patient has the following additional risks and recommendations for perioperative complications:   - Morbid obesity (BMI >40)- weight loss recommended and he will see his PCP. Limit salt and restaurant use discussed.  Obstructive Sleep Apnea:   - test completed, does not have CPAP machine yet. Please monitor.  Anemia/Bleeding/Clotting:    - History of DVT or PE, consider DVT prevention postoperatively. He does not require bridging since the rapid offset and onset of apixban makes bridging anticoagulation unnecessary and is able to take oral medications postoperatively once surgery team allows.     Antiplatelet or Anticoagulation Medication Instructions   - apixaban (Eliquis), edoxaban (Savaysa), rivaroxaban (Xarelto): Bleeding risk is moderate or high for this procedure AND CrCl  (>=) 50 mL/min. DO NOT TAKE 2 days before surgery.     Additional Medication Instructions  Patient Instructions   Hold Eliquis two days prior to surgery and then ask surgery when you can resume    Okay to take other medications without stopping.     -Do not take aspirin starting 7 days prior to your surgery unless specifically told otherwise.     -Do not take NSAIDs (ex: ibuprofen/Advil, naproxen/Aleve, Motrin) starting 7 days prior to your surgery.     -Do not take omega 3 fatty acid supplements (like fish oil) and any oral vitamin E supplement starting 7 days prior to your surgery.    Stop all vitamins unless specifically told otherwise.      -OK to use acetaminophen (Tylenol) for pain control until your surgery.     The surgery team or pre-operative nurse will give you detailed information about restrictions on eating and drinking before surgery and if you need to complete a special bathing procedure prior to surgery. Typically, it is nothing to eat or drink at least 8-10 hours prior to help prevent aspiration.      Recommendation    Enzo Quezada is a 74 year old, presenting for the following:  Pre-Op Exam  (ENUCLEATION, PROSTATE, USING HOLMIUM LASER, CYSTOSCOPY REMOVAL OF BLADDER STONE USING HOLMIUM LASER, SH OR, Dasia Kennedy MD, 9/16/2024, 7:30 AM)          9/9/2024     8:45 AM   Additional Questions   Roomed by Terry     Via the Health Maintenance questionnaire, the patient has reported the following services have been completed -Colonscopy: [place] 2023-10-12, this information has been sent to the abstraction team    HPI related to upcoming procedure: kidney stone, blood in urine, and enlarge prostrate.         9/5/2024   Pre-Op Questionnaire   Have you ever had a heart attack or stroke? No   Have you ever had surgery on your heart or blood vessels, such as a stent placement, a coronary artery bypass, or surgery on an artery in your head, neck, heart, or legs? No   Do you have chest pain with activity? No   Do you have a history of heart failure? No   Do you currently have a cold, bronchitis or symptoms of other infection? No   Do you have a cough, shortness of breath, or wheezing? No   Do you or anyone in your family have previous history of blood clots? (!) YES pulmonary and DVT- seen hematology   Do you or does anyone in your family have a serious bleeding problem such as prolonged bleeding following surgeries or cuts? No   Have you ever had problems with anemia or been told to take iron pills? No   Have you had any abnormal blood loss such as black, tarry or bloody stools? No   Have you ever had a blood transfusion? No   Are you willing to have a blood transfusion if it is medically needed before, during, or after your surgery? Yes   Have you or any of your relatives ever had problems with anesthesia? No   Do you have sleep apnea, excessive snoring or daytime drowsiness? (!) YES; just had the study done. Positive for sleep apnea, but no machine yet.    Do you have a CPAP machine? (!) NO    Do you have any artifical heart valves or other implanted medical devices like a pacemaker, defibrillator, or  continuous glucose monitor? No   Do you have artificial joints? (!) YES- right hip    Are you allergic to latex? No        Status of Chronic Conditions:  See problem list for active medical problems.  Problems all longstanding and stable, except as noted/documented.  See ROS for pertinent symptoms related to these conditions.    Patient Active Problem List    Diagnosis Date Noted    Obstructive sleep apnea 04/08/2024     Priority: Medium    History of pulmonary embolism 12/07/2023     Priority: Medium    Family history of prostate cancer in father 12/07/2023     Priority: Medium    Enlarged prostate 12/07/2023     Priority: Medium    History of colonic polyps 10/20/2023     Priority: Medium    Gallstones 07/08/2023     Priority: Medium    Acute pulmonary embolism (H) 07/08/2023     Priority: Medium     Diagnosed July 2023      Morbid obesity (H)      Priority: Medium     Formatting of this note might be different from the original.  Created by Conversion      Nonalcoholic fatty liver 09/18/2019     Priority: Medium    Hemorrhoids 04/25/2019     Priority: Medium    Hyperlipidemia      Priority: Medium     Created by Conversion      Formatting of this note might be different from the original.  Created by Conversion      Onychomycosis      Priority: Medium     Created by Conversion      Formatting of this note might be different from the original.  Created by Conversion      Benign essential hypertension      Priority: Medium     Created by Conversion      Formatting of this note might be different from the original.  Created by Conversion      Sebaceous cyst      Priority: Medium     Created by Conversion  [facility] Annotation: Apr 28 2014  9:18AM - Jase Rogers: Inflamed   slightly.      Formatting of this note might be different from the original.  Created by Conversion  [facility] Annotation: Apr 28 2014  9:18AM - Jase Rogers: Inflamed   slightly.      Elevated PSA 03/02/2015     Priority: Medium     Kidney cysts 2015     Priority: Medium      Past Medical History:   Diagnosis Date    Arthritis     BPH (benign prostatic hyperplasia)     Chronic knee pain     Hypertension      Past Surgical History:   Procedure Laterality Date    COLONOSCOPY  2016    MENISCECTOMY Right     ORTHOPEDIC SURGERY  Oct 2019, 2022    [facility] APPENDECTOMY      Description: Appendectomy;  Recorded: 2013;     Current Outpatient Medications   Medication Sig Dispense Refill    apixaban ANTICOAGULANT (ELIQUIS) 5 MG tablet Take 1 tablet (5 mg) by mouth 2 times daily 180 tablet 3    atorvastatin (LIPITOR) 80 MG tablet Take 1 tablet (80 mg) by mouth at bedtime Increase dose since LDL is more elevated this year. Recheck in one year 90 tablet 3    Cholecalciferol (VITAMIN D3 PO) Take 400 Units by mouth daily.      diphenhydrAMINE-acetaminophen (TYLENOL PM)  MG tablet Take 1 tablet by mouth nightly as needed for sleep.      doxazosin (CARDURA) 4 MG tablet TAKE 1 TABLET(4 MG) BY MOUTH AT BEDTIME Strength: 4 mg 90 tablet 3    finasteride (PROSCAR) 5 MG tablet Take 1 tablet (5 mg) by mouth daily 30 tablet 1    metoprolol succinate ER (TOPROL XL) 50 MG 24 hr tablet Take 1 tablet (50 mg) by mouth at bedtime 90 tablet 3    betamethasone valerate (VALISONE) 0.1 % external cream Apply topically 2 times daily as needed (for knee pain) (Patient not taking: Reported on 2024)         No Known Allergies     Social History     Tobacco Use    Smoking status: Former     Current packs/day: 0.00     Average packs/day: 0.5 packs/day for 2.2 years (1.1 ttl pk-yrs)     Types: Cigarettes     Start date: 1973     Quit date: 1975     Years since quittin.1     Passive exposure: Past    Smokeless tobacco: Never    Tobacco comments:     In college   Substance Use Topics    Alcohol use: Yes     Alcohol/week: 10.0 standard drinks of alcohol     Types: 10 Standard drinks or equivalent per week     Comment: not everyday, occassional  "    Family History   Problem Relation Age of Onset    No Known Problems Mother         pacemaker    Prostate Cancer Father 57    Heart Disease Father 57    Diabetes Father     Deep Vein Thrombosis (DVT) Sister     No Known Problems Sister     No Known Problems Sister     Hyperlipidemia Brother     No Known Problems Brother     No Known Problems Brother     No Known Problems Brother      History   Drug Use No             Review of Systems  Constitutional, HEENT, cardiovascular, pulmonary, gi and gu systems are negative, except as otherwise noted.    Objective    /89   Pulse 70   Temp 97.8  F (36.6  C) (Oral)   Resp 14   Ht 1.74 m (5' 8.5\")   Wt 121.6 kg (268 lb)   SpO2 98%   BMI 40.16 kg/m     Estimated body mass index is 40.16 kg/m  as calculated from the following:    Height as of this encounter: 1.74 m (5' 8.5\").    Weight as of this encounter: 121.6 kg (268 lb).  Physical Exam  GENERAL: alert and no distress  EYES: Eyes grossly normal to inspection, PERRL and conjunctivae and sclerae normal  NECK: no adenopathy, no asymmetry, masses, or scars  RESP: lungs clear to auscultation - no rales, rhonchi or wheezes  CV: regular rate and rhythm, normal S1 S2, no S3 or S4, no murmur, click or rub, no peripheral edema  ABDOMEN: soft, nontender, no hepatosplenomegaly, no masses and bowel sounds normal  MS: no gross musculoskeletal defects noted, mild +1 bilateral symmetric BLE edema  SKIN: no suspicious lesions or rashes  NEURO: Normal strength and tone, mentation intact and speech normal  PSYCH: mentation appears normal, affect normal/bright  LYMPH: no cervical, supraclavicular, axillary, or inguinal adenopathy    Recent Labs   Lab Test 03/13/24  1010   CR 1.0      Lab Results   Component Value Date    WBC 5.5 09/09/2024     Lab Results   Component Value Date    RBC 4.38 09/09/2024     Lab Results   Component Value Date    HGB 14.0 09/09/2024     Lab Results   Component Value Date    HCT 42.0 09/09/2024     No " "components found for: \"MCT\"  Lab Results   Component Value Date    MCV 96 09/09/2024     Lab Results   Component Value Date    MCH 32.0 09/09/2024     Lab Results   Component Value Date    MCHC 33.3 09/09/2024     Lab Results   Component Value Date    RDW 13.7 09/09/2024     Lab Results   Component Value Date     09/09/2024     Last Comprehensive Metabolic Panel:  Lab Results   Component Value Date     09/09/2024    POTASSIUM 4.5 09/09/2024    CHLORIDE 106 09/09/2024    CO2 26 09/09/2024    ANIONGAP 8 09/09/2024     (H) 09/09/2024    BUN 17.8 09/09/2024    CR 0.97 09/09/2024    GFRESTIMATED 81 09/09/2024    BRIANDA 9.2 09/09/2024       Diagnostics  Labs pending at this time.  Results will be reviewed when available.   EKG required for HTN, obesity  and not completed in the last 90 days.   - EKG notes RBBB and no other changes noted.     Revised Cardiac Risk Index (RCRI)  The patient has the following serious cardiovascular risks for perioperative complications:   - No serious cardiac risks = 0 points          Signed Electronically by: CHOLO Gasca CNP  A copy of this evaluation report is provided to the requesting physician.         "

## 2024-09-09 NOTE — PROGRESS NOTES
Preoperative Evaluation  [facility]  [address] [address]  Phone: [phone]  Fax: [phone]  Primary Provider: Leonardo Perez MD  Pre-op Performing Provider: CHOLO Gasca CNP  Sep 9, 2024             9/5/2024   Surgical Information   What procedure is being done? Urine test and pre op visit   Facility or Hospital where procedure/surgery will be performed: [facility]   Who is doing the procedure / surgery? Dasia Kennedy   Date of surgery / procedure: 09/16/2024   Time of surgery / procedure: 7:45  am   Where do you plan to recover after surgery? at a TCU (Transitional Care Unit)        Fax number for surgical facility: Note does not need to be faxed, will be available electronically in Epic.    Assessment & Plan     The proposed surgical procedure is considered INTERMEDIATE risk.    Benign essential hypertension  **ECHO reviewed from last year. Appears stable from prior test. Edema noted to lower legs around ankles, patient stated this was chronic. Goal for BP is ideally below 130/88, he will follow up with PCP in December. I suggested weight loss and low salt diet and this was discussed.   - EKG 12-lead, tracing only    Morbid obesity (H)  **weight loss discussed.     History of pulmonary embolism  **    Enlarged prostate  **    Elevated PSA  **    Obstructive sleep apnea  **no CPAP machine yet.     Preop general physical exam  **  - CBC with platelets  - Basic metabolic panel  - CBC with platelets  - Basic metabolic panel    Hyperlipidemia, unspecified hyperlipidemia type  **lipids improved with higher statin dose. Low saturated fat diet recommended and regular exercise.   - Lipid Profile (Chol, Trig, HDL, LDL calc)  - Lipid Profile (Chol, Trig, HDL, LDL calc)    Kidney stone  **    Gross hematuria  **  - Urine Culture    Benign prostatic hyperplasia with incomplete bladder emptying  **  - Urine Culture        Risks and  Recommendations  The patient has the following additional risks and recommendations for perioperative complications:   - Morbid obesity (BMI >40)- weight loss recommended and he will see his PCP. Limit salt and restaurant use discussed.  Obstructive Sleep Apnea:   - test completed, does not have CPAP machine yet. Please monitor.  Anemia/Bleeding/Clotting:    - History of DVT or PE, consider DVT prevention postoperatively. He does not require bridging since the rapid offset and onset of apixban makes bridging anticoagulation unnecessary and is able to take oral medications postoperatively once surgery team allows.     Antiplatelet or Anticoagulation Medication Instructions   - apixaban (Eliquis), edoxaban (Savaysa), rivaroxaban (Xarelto): Bleeding risk is moderate or high for this procedure AND CrCl  (>=) 50 mL/min. DO NOT TAKE 2 days before surgery.     Additional Medication Instructions  Patient Instructions   Hold Eliquis two days prior to surgery and then ask surgery when you can resume    Okay to take other medications without stopping.     -Do not take aspirin starting 7 days prior to your surgery unless specifically told otherwise.     -Do not take NSAIDs (ex: ibuprofen/Advil, naproxen/Aleve, Motrin) starting 7 days prior to your surgery.     -Do not take omega 3 fatty acid supplements (like fish oil) and any oral vitamin E supplement starting 7 days prior to your surgery.    Stop all vitamins unless specifically told otherwise.      -OK to use acetaminophen (Tylenol) for pain control until your surgery.     The surgery team or pre-operative nurse will give you detailed information about restrictions on eating and drinking before surgery and if you need to complete a special bathing procedure prior to surgery. Typically, it is nothing to eat or drink at least 8-10 hours prior to help prevent aspiration.      Recommendation    Enzo Quezada is a 74 year old, presenting for the following:  Pre-Op Exam  (ENUCLEATION, PROSTATE, USING HOLMIUM LASER, CYSTOSCOPY REMOVAL OF BLADDER STONE USING HOLMIUM LASER, SH OR, Dasia Kennedy MD, 9/16/2024, 7:30 AM)          9/9/2024     8:45 AM   Additional Questions   Roomed by Terry     Via the Health Maintenance questionnaire, the patient has reported the following services have been completed -[place]onscopy: [place] 2023-10-12, this information has been sent to the abstraction team    HPI related to upcoming procedure: kidney stone, blood in urine, and enlarge prostrate.         9/5/2024   Pre-Op Questionnaire   Have you ever had a heart attack or stroke? No   Have you ever had surgery on your heart or blood vessels, such as a stent placement, a coronary artery bypass, or surgery on an artery in your head, neck, heart, or legs? No   Do you have chest pain with activity? No   Do you have a history of heart failure? No   Do you currently have a cold, bronchitis or symptoms of other infection? No   Do you have a cough, shortness of breath, or wheezing? No   Do you or anyone in your family have previous history of blood clots? (!) YES pulmonary and DVT- seen hematology   Do you or does anyone in your family have a serious bleeding problem such as prolonged bleeding following surgeries or cuts? No   Have you ever had problems with anemia or been told to take iron pills? No   Have you had any abnormal blood loss such as black, tarry or bloody stools? No   Have you ever had a blood transfusion? No   Are you willing to have a blood transfusion if it is medically needed before, during, or after your surgery? Yes   Have you or any of your relatives ever had problems with anesthesia? No   Do you have sleep apnea, excessive snoring or daytime drowsiness? (!) YES; just had the study done. Positive for sleep apnea, but no machine yet.    Do you have a CPAP machine? (!) NO    Do you have any artifical heart valves or other implanted medical devices like a pacemaker, defibrillator, or  continuous glucose monitor? No   Do you have artificial joints? (!) YES- right hip    Are you allergic to latex? No        Status of Chronic Conditions:  See problem list for active medical problems.  Problems all longstanding and stable, except as noted/documented.  See ROS for pertinent symptoms related to these conditions.    Patient Active Problem List    Diagnosis Date Noted    Obstructive sleep apnea 04/08/2024     Priority: Medium    History of pulmonary embolism 12/07/2023     Priority: Medium    Family history of prostate cancer in father 12/07/2023     Priority: Medium    Enlarged prostate 12/07/2023     Priority: Medium    History of colonic polyps 10/20/2023     Priority: Medium    Gallstones 07/08/2023     Priority: Medium    Acute pulmonary embolism (H) 07/08/2023     Priority: Medium     Diagnosed July 2023      Morbid obesity (H)      Priority: Medium     Formatting of this note might be different from the original.  Created by Conversion      Nonalcoholic fatty liver 09/18/2019     Priority: Medium    Hemorrhoids 04/25/2019     Priority: Medium    Hyperlipidemia      Priority: Medium     Created by Conversion      Formatting of this note might be different from the original.  Created by Conversion      Onychomycosis      Priority: Medium     Created by Conversion      Formatting of this note might be different from the original.  Created by Conversion      Benign essential hypertension      Priority: Medium     Created by Conversion      Formatting of this note might be different from the original.  Created by Conversion      Sebaceous cyst      Priority: Medium     Created by Conversion  [employer] [facility] Annotation: Apr 28 2014  9:18AM - Jase Rogers: Inflamed   slightly.      Formatting of this note might be different from the original.  Created by Conversion  [facility] Annotation: Apr 28 2014  9:18AM - Jase Rogers: Inflamed   slightly.      Elevated PSA 03/02/2015     Priority: Medium     Kidney cysts 2015     Priority: Medium      Past Medical History:   Diagnosis Date    Arthritis     BPH (benign prostatic hyperplasia)     Chronic knee pain     Hypertension      Past Surgical History:   Procedure Laterality Date    COLONOSCOPY  2016    MENISCECTOMY Right     ORTHOPEDIC SURGERY  Oct 2019, 2022    [facility] APPENDECTOMY      Description: Appendectomy;  Recorded: 2013;     Current Outpatient Medications   Medication Sig Dispense Refill    apixaban ANTICOAGULANT (ELIQUIS) 5 MG tablet Take 1 tablet (5 mg) by mouth 2 times daily 180 tablet 3    atorvastatin (LIPITOR) 80 MG tablet Take 1 tablet (80 mg) by mouth at bedtime Increase dose since LDL is more elevated this year. Recheck in one year 90 tablet 3    Cholecalciferol (VITAMIN D3 PO) Take 400 Units by mouth daily.      diphenhydrAMINE-acetaminophen (TYLENOL PM)  MG tablet Take 1 tablet by mouth nightly as needed for sleep.      doxazosin (CARDURA) 4 MG tablet TAKE 1 TABLET(4 MG) BY MOUTH AT BEDTIME Strength: 4 mg 90 tablet 3    finasteride (PROSCAR) 5 MG tablet Take 1 tablet (5 mg) by mouth daily 30 tablet 1    metoprolol succinate ER (TOPROL XL) 50 MG 24 hr tablet Take 1 tablet (50 mg) by mouth at bedtime 90 tablet 3    betamethasone valerate (VALISONE) 0.1 % external cream Apply topically 2 times daily as needed (for knee pain) (Patient not taking: Reported on 2024)         No Known Allergies     Social History     Tobacco Use    Smoking status: Former     Current packs/day: 0.00     Average packs/day: 0.5 packs/day for 2.2 years (1.1 ttl pk-yrs)     Types: Cigarettes     Start date: 1973     Quit date: 1975     Years since quittin.1     Passive exposure: Past    Smokeless tobacco: Never    Tobacco comments:     In college   Substance Use Topics    Alcohol use: Yes     Alcohol/week: 10.0 standard drinks of alcohol     Types: 10 Standard drinks or equivalent per week     Comment: not everyday, occassional  "    Family History   Problem Relation Age of Onset    No Known Problems Mother         pacemaker    Prostate Cancer Father 57    Heart Disease Father 57    Diabetes Father     Deep Vein Thrombosis (DVT) Sister     No Known Problems Sister     No Known Problems Sister     Hyperlipidemia Brother     No Known Problems Brother     No Known Problems Brother     No Known Problems Brother      History   Drug Use No             Review of Systems  Constitutional, HEENT, cardiovascular, pulmonary, gi and gu systems are negative, except as otherwise noted.    Objective    /89   Pulse 70   Temp 97.8  F (36.6  C) (Oral)   Resp 14   Ht 1.74 m (5' 8.5\")   Wt 121.6 kg (268 lb)   SpO2 98%   BMI 40.16 kg/m     Estimated body mass index is 40.16 kg/m  as calculated from the following:    Height as of this encounter: 1.74 m (5' 8.5\").    Weight as of this encounter: 121.6 kg (268 lb).  Physical Exam  GENERAL: alert and no distress  EYES: Eyes grossly normal to inspection, PERRL and conjunctivae and sclerae normal  NECK: no adenopathy, no asymmetry, masses, or scars  RESP: lungs clear to auscultation - no rales, rhonchi or wheezes  CV: regular rate and rhythm, normal S1 S2, no S3 or S4, no murmur, click or rub, no peripheral edema  ABDOMEN: soft, nontender, no hepatosplenomegaly, no masses and bowel sounds normal  MS: no gross musculoskeletal defects noted, mild +1 bilateral symmetric BLE edema  SKIN: no suspicious lesions or rashes  NEURO: Normal strength and tone, mentation intact and speech normal  PSYCH: mentation appears normal, affect normal/bright  LYMPH: no cervical, supraclavicular, axillary, or inguinal adenopathy    Recent Labs   Lab Test 03/13/24  1010   CR 1.0      Lab Results   Component Value Date    WBC 5.5 09/09/2024     Lab Results   Component Value Date    RBC 4.38 09/09/2024     Lab Results   Component Value Date    HGB 14.0 09/09/2024     Lab Results   Component Value Date    HCT 42.0 09/09/2024     No " "components found for: \"MCT\"  Lab Results   Component Value Date    MCV 96 09/09/2024     Lab Results   Component Value Date    MCH 32.0 09/09/2024     Lab Results   Component Value Date    MCHC 33.3 09/09/2024     Lab Results   Component Value Date    RDW 13.7 09/09/2024     Lab Results   Component Value Date     09/09/2024     Last Comprehensive Metabolic Panel:  Lab Results   Component Value Date     09/09/2024    POTASSIUM 4.5 09/09/2024    CHLORIDE 106 09/09/2024    CO2 26 09/09/2024    ANIONGAP 8 09/09/2024     (H) 09/09/2024    BUN 17.8 09/09/2024    CR 0.97 09/09/2024    GFRESTIMATED 81 09/09/2024    BRIANDA 9.2 09/09/2024       Diagnostics  Labs pending at this time.  Results will be reviewed when available.   EKG required for HTN, obesity  and not completed in the last 90 days.   - EKG notes RBBB and no other changes noted.     Revised Cardiac Risk Index (RCRI)  The patient has the following serious cardiovascular risks for perioperative complications:   - No serious cardiac risks = 0 points          Signed Electronically by: CHOLO Gasca CNP  A copy of this evaluation report is provided to the requesting physician.         "

## 2024-09-09 NOTE — PATIENT INSTRUCTIONS
Hold Eliquis two days prior to surgery and then ask surgery when you can resume    Okay to take other medications without stopping.     -Do not take aspirin starting 7 days prior to your surgery unless specifically told otherwise.     -Do not take NSAIDs (ex: ibuprofen/Advil, naproxen/Aleve, Motrin) starting 7 days prior to your surgery.     -Do not take omega 3 fatty acid supplements (like fish oil) and any oral vitamin E supplement starting 7 days prior to your surgery.    Stop all vitamins unless specifically told otherwise.      -OK to use acetaminophen (Tylenol) for pain control until your surgery.     The surgery team or pre-operative nurse will give you detailed information about restrictions on eating and drinking before surgery and if you need to complete a special bathing procedure prior to surgery. Typically, it is nothing to eat or drink at least 8-10 hours prior to help prevent aspiration.

## 2024-09-10 LAB
ATRIAL RATE - MUSE: 63 BPM
DIASTOLIC BLOOD PRESSURE - MUSE: NORMAL MMHG
INTERPRETATION ECG - MUSE: NORMAL
P AXIS - MUSE: 30 DEGREES
PR INTERVAL - MUSE: 182 MS
QRS DURATION - MUSE: 138 MS
QT - MUSE: 444 MS
QTC - MUSE: 454 MS
R AXIS - MUSE: -36 DEGREES
SYSTOLIC BLOOD PRESSURE - MUSE: NORMAL MMHG
T AXIS - MUSE: -1 DEGREES
VENTRICULAR RATE- MUSE: 63 BPM

## 2024-09-10 NOTE — TELEPHONE ENCOUNTER
Spoke to pt after talking with Terra. Pt instructed that he can buy the soap otc or stop in and get a packet.  Pt is agreeable and will go buy it.    Sara South   [facility] [place]   [facility]  [phone]

## 2024-09-11 ENCOUNTER — TELEPHONE (OUTPATIENT)
Dept: SURGERY | Facility: CLINIC | Age: 74
End: 2024-09-11
Payer: COMMERCIAL

## 2024-09-11 DIAGNOSIS — N40.0 ENLARGED PROSTATE: Primary | ICD-10-CM

## 2024-09-11 LAB
BACTERIA UR CULT: ABNORMAL
BACTERIA UR CULT: ABNORMAL

## 2024-09-11 NOTE — TELEPHONE ENCOUNTER
Urine culture with      10,000-50,000 CFU/mL Actinotignum schaalii Abnormal    Susceptibilities not routinely done, refer to antibiogram to view typical susceptibility profiles   <10,000 CFU/mL Corynebacterium glucuronolyticum Abnormal    Susceptibilities not routinely done, refer to antibiogram to view typical susceptibility profiles          Based on reported susceptibility and literature will plan to do beta-lactam with Augmentin I called the patient and left him a voicemail

## 2024-09-12 ENCOUNTER — TELEPHONE (OUTPATIENT)
Dept: UROLOGY | Facility: CLINIC | Age: 74
End: 2024-09-12
Payer: COMMERCIAL

## 2024-09-12 NOTE — TELEPHONE ENCOUNTER
Spoke with patient who will  and start antibiotic today.  He did get the message and had no further questions.  Aspen Hemphill RN

## 2024-09-13 RX ORDER — ACETAMINOPHEN 500 MG
1000 TABLET ORAL EVERY 6 HOURS PRN
COMMUNITY

## 2024-09-13 NOTE — PROGRESS NOTES
PTA medications updated by Medication Scribe prior to surgery via phone call with patient (last doses completed by Nurse)     Medication history sources: Patient, Surescripts, and H&P  In the past week, patient estimated taking medication this percent of the time: Greater than 90%      Significant changes made to the medication list:  Patient reports no longer taking the following meds (med scribe removed from PTA med list): Valisone, Proscar      Additional medication history information:   None    Medication reconciliation completed by provider prior to medication history? No    Time spent in this activity: 25 minutes    The information provided in this note is only as accurate as the sources available at the time of update(s)      Prior to Admission medications    Medication Sig Last Dose Taking? Auth Provider Long Term End Date   acetaminophen (TYLENOL) 500 MG tablet Take 1,000 mg by mouth every 6 hours as needed for mild pain ([id number]). 9/11/2024 at PRN Yes Reported, Patient     amoxicillin-clavulanate (AUGMENTIN) 875-125 MG tablet Take 1 tablet by mouth 2 times daily for 7 days.  at PM Yes Dasia Kennedy MD  9/18/24   apixaban ANTICOAGULANT (ELIQUIS) 5 MG tablet Take 1 tablet (5 mg) by mouth 2 times daily 9/13/2024 at PM Yes Michael Palomares, PAGodwinC     atorvastatin (LIPITOR) 80 MG tablet Take 1 tablet (80 mg) by mouth at bedtime Increase dose since LDL is more elevated this year. Recheck in one year  at PM Yes Rae Phillip, APRN CNP Yes    Cholecalciferol (VITAMIN D3 PO) Take 400 Units by mouth daily.  at PM Yes Reported, Patient     diphenhydrAMINE-acetaminophen (TYLENOL PM)  MG tablet Take 1 tablet by mouth nightly as needed for sleep.  at PM Yes Unknown, Entered By History     doxazosin (CARDURA) 4 MG tablet TAKE 1 TABLET(4 MG) BY MOUTH AT BEDTIME Strength: 4 mg  at PM Yes Leonardo Perez MD Yes    metoprolol succinate ER (TOPROL XL) 50 MG 24 hr tablet Take 1 tablet (50 mg) by  mouth at bedtime  at PM Yes Leonardo Perez MD Yes        Medication history completed by: Abner Best

## 2024-09-16 ENCOUNTER — HOSPITAL ENCOUNTER (OUTPATIENT)
Facility: CLINIC | Age: 74
Discharge: HOME | End: 2024-09-17
Attending: STUDENT IN AN ORGANIZED HEALTH CARE EDUCATION/TRAINING PROGRAM | Admitting: STUDENT IN AN ORGANIZED HEALTH CARE EDUCATION/TRAINING PROGRAM
Payer: COMMERCIAL

## 2024-09-16 ENCOUNTER — ANESTHESIA (OUTPATIENT)
Dept: SURGERY | Facility: CLINIC | Age: 74
End: 2024-09-16
Payer: COMMERCIAL

## 2024-09-16 ENCOUNTER — ANESTHESIA EVENT (OUTPATIENT)
Dept: SURGERY | Facility: CLINIC | Age: 74
End: 2024-09-16
Payer: COMMERCIAL

## 2024-09-16 DIAGNOSIS — I26.99 ACUTE PULMONARY EMBOLISM, UNSPECIFIED PULMONARY EMBOLISM TYPE, UNSPECIFIED WHETHER ACUTE COR PULMONALE PRESENT (H): ICD-10-CM

## 2024-09-16 DIAGNOSIS — N40.0 ENLARGED PROSTATE: ICD-10-CM

## 2024-09-16 LAB
ABO/RH(D): NORMAL
ANTIBODY SCREEN: NEGATIVE
SPECIMEN EXPIRATION DATE: NORMAL

## 2024-09-16 PROCEDURE — 88305 TISSUE EXAM BY PATHOLOGIST: CPT | Mod: TC | Performed by: STUDENT IN AN ORGANIZED HEALTH CARE EDUCATION/TRAINING PROGRAM

## 2024-09-16 PROCEDURE — 250N000011 HC RX IP 250 OP 636: Performed by: NURSE ANESTHETIST, CERTIFIED REGISTERED

## 2024-09-16 PROCEDURE — 710N000009 HC RECOVERY PHASE 1, LEVEL 1, PER MIN: Performed by: STUDENT IN AN ORGANIZED HEALTH CARE EDUCATION/TRAINING PROGRAM

## 2024-09-16 PROCEDURE — 99100 ANES PT EXTEME AGE<1 YR&>70: CPT | Performed by: NURSE ANESTHETIST, CERTIFIED REGISTERED

## 2024-09-16 PROCEDURE — 258N000003 HC RX IP 258 OP 636: Performed by: ANESTHESIOLOGY

## 2024-09-16 PROCEDURE — 250N000009 HC RX 250: Performed by: NURSE ANESTHETIST, CERTIFIED REGISTERED

## 2024-09-16 PROCEDURE — 999N000141 HC STATISTIC PRE-PROCEDURE NURSING ASSESSMENT: Performed by: STUDENT IN AN ORGANIZED HEALTH CARE EDUCATION/TRAINING PROGRAM

## 2024-09-16 PROCEDURE — 250N000013 HC RX MED GY IP 250 OP 250 PS 637: Performed by: STUDENT IN AN ORGANIZED HEALTH CARE EDUCATION/TRAINING PROGRAM

## 2024-09-16 PROCEDURE — 250N000011 HC RX IP 250 OP 636: Performed by: STUDENT IN AN ORGANIZED HEALTH CARE EDUCATION/TRAINING PROGRAM

## 2024-09-16 PROCEDURE — 258N000003 HC RX IP 258 OP 636: Performed by: STUDENT IN AN ORGANIZED HEALTH CARE EDUCATION/TRAINING PROGRAM

## 2024-09-16 PROCEDURE — C1758 CATHETER, URETERAL: HCPCS | Performed by: STUDENT IN AN ORGANIZED HEALTH CARE EDUCATION/TRAINING PROGRAM

## 2024-09-16 PROCEDURE — 360N000077 HC SURGERY LEVEL 4, PER MIN: Performed by: STUDENT IN AN ORGANIZED HEALTH CARE EDUCATION/TRAINING PROGRAM

## 2024-09-16 PROCEDURE — 370N000017 HC ANESTHESIA TECHNICAL FEE, PER MIN: Performed by: STUDENT IN AN ORGANIZED HEALTH CARE EDUCATION/TRAINING PROGRAM

## 2024-09-16 PROCEDURE — 88305 TISSUE EXAM BY PATHOLOGIST: CPT | Mod: 26 | Performed by: PATHOLOGY

## 2024-09-16 PROCEDURE — 86900 BLOOD TYPING SEROLOGIC ABO: CPT | Performed by: STUDENT IN AN ORGANIZED HEALTH CARE EDUCATION/TRAINING PROGRAM

## 2024-09-16 PROCEDURE — 258N000001 HC RX 258: Performed by: STUDENT IN AN ORGANIZED HEALTH CARE EDUCATION/TRAINING PROGRAM

## 2024-09-16 PROCEDURE — 52649 PROSTATE LASER ENUCLEATION: CPT | Performed by: NURSE ANESTHETIST, CERTIFIED REGISTERED

## 2024-09-16 PROCEDURE — 52649 PROSTATE LASER ENUCLEATION: CPT | Performed by: STUDENT IN AN ORGANIZED HEALTH CARE EDUCATION/TRAINING PROGRAM

## 2024-09-16 PROCEDURE — 258N000003 HC RX IP 258 OP 636: Performed by: NURSE ANESTHETIST, CERTIFIED REGISTERED

## 2024-09-16 PROCEDURE — 272N000001 HC OR GENERAL SUPPLY STERILE: Performed by: STUDENT IN AN ORGANIZED HEALTH CARE EDUCATION/TRAINING PROGRAM

## 2024-09-16 PROCEDURE — 250N000025 HC SEVOFLURANE, PER MIN: Performed by: STUDENT IN AN ORGANIZED HEALTH CARE EDUCATION/TRAINING PROGRAM

## 2024-09-16 PROCEDURE — 36415 COLL VENOUS BLD VENIPUNCTURE: CPT | Performed by: STUDENT IN AN ORGANIZED HEALTH CARE EDUCATION/TRAINING PROGRAM

## 2024-09-16 PROCEDURE — 250N000009 HC RX 250: Performed by: STUDENT IN AN ORGANIZED HEALTH CARE EDUCATION/TRAINING PROGRAM

## 2024-09-16 RX ORDER — SODIUM CHLORIDE, SODIUM LACTATE, POTASSIUM CHLORIDE, CALCIUM CHLORIDE 600; 310; 30; 20 MG/100ML; MG/100ML; MG/100ML; MG/100ML
INJECTION, SOLUTION INTRAVENOUS CONTINUOUS
Status: DISCONTINUED | OUTPATIENT
Start: 2024-09-16 | End: 2024-09-16 | Stop reason: HOSPADM

## 2024-09-16 RX ORDER — AMPICILLIN 2 G/1
2 INJECTION, POWDER, FOR SOLUTION INTRAVENOUS EVERY 6 HOURS
Status: DISCONTINUED | OUTPATIENT
Start: 2024-09-16 | End: 2024-09-17 | Stop reason: HOSPADM

## 2024-09-16 RX ORDER — ATORVASTATIN CALCIUM 80 MG/1
80 TABLET, FILM COATED ORAL AT BEDTIME
Status: DISCONTINUED | OUTPATIENT
Start: 2024-09-16 | End: 2024-09-17 | Stop reason: HOSPADM

## 2024-09-16 RX ORDER — EPHEDRINE SULFATE 50 MG/ML
INJECTION, SOLUTION INTRAMUSCULAR; INTRAVENOUS; SUBCUTANEOUS PRN
Status: DISCONTINUED | OUTPATIENT
Start: 2024-09-16 | End: 2024-09-16

## 2024-09-16 RX ORDER — PHENAZOPYRIDINE HYDROCHLORIDE 200 MG/1
200 TABLET, FILM COATED ORAL 3 TIMES DAILY PRN
Qty: 9 TABLET | Refills: 0 | Status: SHIPPED | OUTPATIENT
Start: 2024-09-16

## 2024-09-16 RX ORDER — NALOXONE HYDROCHLORIDE 0.4 MG/ML
0.2 INJECTION, SOLUTION INTRAMUSCULAR; INTRAVENOUS; SUBCUTANEOUS
Status: DISCONTINUED | OUTPATIENT
Start: 2024-09-16 | End: 2024-09-17 | Stop reason: HOSPADM

## 2024-09-16 RX ORDER — ACETAMINOPHEN 325 MG/1
975 TABLET ORAL ONCE
Status: COMPLETED | OUTPATIENT
Start: 2024-09-16 | End: 2024-09-16

## 2024-09-16 RX ORDER — NALOXONE HYDROCHLORIDE 0.4 MG/ML
0.4 INJECTION, SOLUTION INTRAMUSCULAR; INTRAVENOUS; SUBCUTANEOUS
Status: DISCONTINUED | OUTPATIENT
Start: 2024-09-16 | End: 2024-09-17 | Stop reason: HOSPADM

## 2024-09-16 RX ORDER — MAGNESIUM HYDROXIDE 1200 MG/15ML
LIQUID ORAL PRN
Status: DISCONTINUED | OUTPATIENT
Start: 2024-09-16 | End: 2024-09-16 | Stop reason: HOSPADM

## 2024-09-16 RX ORDER — BISACODYL 10 MG
10 SUPPOSITORY, RECTAL RECTAL DAILY PRN
Status: DISCONTINUED | OUTPATIENT
Start: 2024-09-16 | End: 2024-09-17 | Stop reason: HOSPADM

## 2024-09-16 RX ORDER — CARBOXYMETHYLCELLULOSE SODIUM 5 MG/ML
1 SOLUTION/ DROPS OPHTHALMIC
Status: DISCONTINUED | OUTPATIENT
Start: 2024-09-16 | End: 2024-09-17 | Stop reason: HOSPADM

## 2024-09-16 RX ORDER — FENTANYL CITRATE 50 UG/ML
50 INJECTION, SOLUTION INTRAMUSCULAR; INTRAVENOUS EVERY 5 MIN PRN
Status: DISCONTINUED | OUTPATIENT
Start: 2024-09-16 | End: 2024-09-16 | Stop reason: HOSPADM

## 2024-09-16 RX ORDER — SIMETHICONE 80 MG
80 TABLET,CHEWABLE ORAL 4 TIMES DAILY PRN
Status: DISCONTINUED | OUTPATIENT
Start: 2024-09-16 | End: 2024-09-17 | Stop reason: HOSPADM

## 2024-09-16 RX ORDER — ONDANSETRON 4 MG/1
4 TABLET, ORALLY DISINTEGRATING ORAL EVERY 30 MIN PRN
Status: DISCONTINUED | OUTPATIENT
Start: 2024-09-16 | End: 2024-09-16 | Stop reason: HOSPADM

## 2024-09-16 RX ORDER — ONDANSETRON 2 MG/ML
INJECTION INTRAMUSCULAR; INTRAVENOUS PRN
Status: DISCONTINUED | OUTPATIENT
Start: 2024-09-16 | End: 2024-09-16

## 2024-09-16 RX ORDER — ONDANSETRON 4 MG/1
4 TABLET, ORALLY DISINTEGRATING ORAL EVERY 6 HOURS PRN
Status: DISCONTINUED | OUTPATIENT
Start: 2024-09-16 | End: 2024-09-17 | Stop reason: HOSPADM

## 2024-09-16 RX ORDER — METOPROLOL SUCCINATE 50 MG/1
50 TABLET, EXTENDED RELEASE ORAL AT BEDTIME
Status: DISCONTINUED | OUTPATIENT
Start: 2024-09-16 | End: 2024-09-17 | Stop reason: HOSPADM

## 2024-09-16 RX ORDER — HYDROMORPHONE HCL IN WATER/PF 6 MG/30 ML
0.2 PATIENT CONTROLLED ANALGESIA SYRINGE INTRAVENOUS EVERY 5 MIN PRN
Status: DISCONTINUED | OUTPATIENT
Start: 2024-09-16 | End: 2024-09-16 | Stop reason: HOSPADM

## 2024-09-16 RX ORDER — SODIUM CHLORIDE 9 MG/ML
INJECTION, SOLUTION INTRAVENOUS CONTINUOUS
Status: ACTIVE | OUTPATIENT
Start: 2024-09-16 | End: 2024-09-16

## 2024-09-16 RX ORDER — FENTANYL CITRATE 50 UG/ML
INJECTION, SOLUTION INTRAMUSCULAR; INTRAVENOUS PRN
Status: DISCONTINUED | OUTPATIENT
Start: 2024-09-16 | End: 2024-09-16

## 2024-09-16 RX ORDER — NALOXONE HYDROCHLORIDE 0.4 MG/ML
0.1 INJECTION, SOLUTION INTRAMUSCULAR; INTRAVENOUS; SUBCUTANEOUS
Status: DISCONTINUED | OUTPATIENT
Start: 2024-09-16 | End: 2024-09-16 | Stop reason: HOSPADM

## 2024-09-16 RX ORDER — ONDANSETRON 2 MG/ML
4 INJECTION INTRAMUSCULAR; INTRAVENOUS EVERY 6 HOURS PRN
Status: DISCONTINUED | OUTPATIENT
Start: 2024-09-16 | End: 2024-09-17 | Stop reason: HOSPADM

## 2024-09-16 RX ORDER — FENTANYL CITRATE 50 UG/ML
25 INJECTION, SOLUTION INTRAMUSCULAR; INTRAVENOUS EVERY 5 MIN PRN
Status: DISCONTINUED | OUTPATIENT
Start: 2024-09-16 | End: 2024-09-16 | Stop reason: HOSPADM

## 2024-09-16 RX ORDER — OXYCODONE HYDROCHLORIDE 5 MG/1
5 TABLET ORAL EVERY 4 HOURS PRN
Status: DISCONTINUED | OUTPATIENT
Start: 2024-09-16 | End: 2024-09-17 | Stop reason: HOSPADM

## 2024-09-16 RX ORDER — HYDROMORPHONE HCL IN WATER/PF 6 MG/30 ML
0.4 PATIENT CONTROLLED ANALGESIA SYRINGE INTRAVENOUS EVERY 5 MIN PRN
Status: DISCONTINUED | OUTPATIENT
Start: 2024-09-16 | End: 2024-09-16 | Stop reason: HOSPADM

## 2024-09-16 RX ORDER — FUROSEMIDE 10 MG/ML
10 INJECTION INTRAMUSCULAR; INTRAVENOUS ONCE
Status: COMPLETED | OUTPATIENT
Start: 2024-09-17 | End: 2024-09-17

## 2024-09-16 RX ORDER — DIPHENHYDRAMINE HYDROCHLORIDE 50 MG/ML
12.5 INJECTION INTRAMUSCULAR; INTRAVENOUS EVERY 6 HOURS PRN
Status: DISCONTINUED | OUTPATIENT
Start: 2024-09-16 | End: 2024-09-17 | Stop reason: HOSPADM

## 2024-09-16 RX ORDER — HALOPERIDOL 5 MG/ML
1 INJECTION INTRAMUSCULAR
Status: DISCONTINUED | OUTPATIENT
Start: 2024-09-16 | End: 2024-09-16 | Stop reason: HOSPADM

## 2024-09-16 RX ORDER — AMPICILLIN 2 G/1
2 INJECTION, POWDER, FOR SOLUTION INTRAVENOUS EVERY 6 HOURS
Status: DISCONTINUED | OUTPATIENT
Start: 2024-09-16 | End: 2024-09-16 | Stop reason: HOSPADM

## 2024-09-16 RX ORDER — AMOXICILLIN 250 MG
1 CAPSULE ORAL DAILY
Qty: 30 TABLET | Refills: 0 | Status: SHIPPED | OUTPATIENT
Start: 2024-09-16

## 2024-09-16 RX ORDER — PROPOFOL 10 MG/ML
INJECTION, EMULSION INTRAVENOUS CONTINUOUS PRN
Status: DISCONTINUED | OUTPATIENT
Start: 2024-09-16 | End: 2024-09-16

## 2024-09-16 RX ORDER — ACETAMINOPHEN 325 MG/1
975 TABLET ORAL EVERY 8 HOURS
Status: DISCONTINUED | OUTPATIENT
Start: 2024-09-16 | End: 2024-09-17 | Stop reason: HOSPADM

## 2024-09-16 RX ORDER — DEXAMETHASONE SODIUM PHOSPHATE 4 MG/ML
INJECTION, SOLUTION INTRA-ARTICULAR; INTRALESIONAL; INTRAMUSCULAR; INTRAVENOUS; SOFT TISSUE PRN
Status: DISCONTINUED | OUTPATIENT
Start: 2024-09-16 | End: 2024-09-16

## 2024-09-16 RX ORDER — LIDOCAINE 40 MG/G
CREAM TOPICAL
Status: DISCONTINUED | OUTPATIENT
Start: 2024-09-16 | End: 2024-09-16 | Stop reason: HOSPADM

## 2024-09-16 RX ORDER — PROPOFOL 10 MG/ML
INJECTION, EMULSION INTRAVENOUS PRN
Status: DISCONTINUED | OUTPATIENT
Start: 2024-09-16 | End: 2024-09-16

## 2024-09-16 RX ORDER — DEXAMETHASONE SODIUM PHOSPHATE 4 MG/ML
4 INJECTION, SOLUTION INTRA-ARTICULAR; INTRALESIONAL; INTRAMUSCULAR; INTRAVENOUS; SOFT TISSUE
Status: DISCONTINUED | OUTPATIENT
Start: 2024-09-16 | End: 2024-09-16 | Stop reason: HOSPADM

## 2024-09-16 RX ORDER — PROCHLORPERAZINE MALEATE 5 MG
5 TABLET ORAL EVERY 6 HOURS PRN
Status: DISCONTINUED | OUTPATIENT
Start: 2024-09-16 | End: 2024-09-17 | Stop reason: HOSPADM

## 2024-09-16 RX ORDER — LIDOCAINE HYDROCHLORIDE 20 MG/ML
INJECTION, SOLUTION INFILTRATION; PERINEURAL PRN
Status: DISCONTINUED | OUTPATIENT
Start: 2024-09-16 | End: 2024-09-16

## 2024-09-16 RX ORDER — DIPHENHYDRAMINE HCL 12.5MG/5ML
12.5 LIQUID (ML) ORAL EVERY 6 HOURS PRN
Status: DISCONTINUED | OUTPATIENT
Start: 2024-09-16 | End: 2024-09-17 | Stop reason: HOSPADM

## 2024-09-16 RX ORDER — CALCIUM CARBONATE 500 MG/1
500 TABLET, CHEWABLE ORAL 2 TIMES DAILY PRN
Status: DISCONTINUED | OUTPATIENT
Start: 2024-09-16 | End: 2024-09-17 | Stop reason: HOSPADM

## 2024-09-16 RX ORDER — AMOXICILLIN 250 MG
1 CAPSULE ORAL 2 TIMES DAILY
Status: DISCONTINUED | OUTPATIENT
Start: 2024-09-16 | End: 2024-09-17 | Stop reason: HOSPADM

## 2024-09-16 RX ORDER — OXYCODONE HYDROCHLORIDE 5 MG/1
10 TABLET ORAL EVERY 4 HOURS PRN
Status: DISCONTINUED | OUTPATIENT
Start: 2024-09-16 | End: 2024-09-17 | Stop reason: HOSPADM

## 2024-09-16 RX ORDER — ONDANSETRON 2 MG/ML
4 INJECTION INTRAMUSCULAR; INTRAVENOUS EVERY 30 MIN PRN
Status: DISCONTINUED | OUTPATIENT
Start: 2024-09-16 | End: 2024-09-16 | Stop reason: HOSPADM

## 2024-09-16 RX ORDER — ACETAMINOPHEN 650 MG/1
650 SUPPOSITORY RECTAL ONCE
Status: COMPLETED | OUTPATIENT
Start: 2024-09-16 | End: 2024-09-16

## 2024-09-16 RX ORDER — IBUPROFEN 600 MG/1
600 TABLET, FILM COATED ORAL EVERY 6 HOURS PRN
Status: DISCONTINUED | OUTPATIENT
Start: 2024-09-16 | End: 2024-09-17 | Stop reason: HOSPADM

## 2024-09-16 RX ORDER — LIDOCAINE 40 MG/G
CREAM TOPICAL
Status: DISCONTINUED | OUTPATIENT
Start: 2024-09-16 | End: 2024-09-17 | Stop reason: HOSPADM

## 2024-09-16 RX ORDER — POLYETHYLENE GLYCOL 3350 17 G/17G
17 POWDER, FOR SOLUTION ORAL DAILY
Status: DISCONTINUED | OUTPATIENT
Start: 2024-09-17 | End: 2024-09-17 | Stop reason: HOSPADM

## 2024-09-16 RX ORDER — ACETAMINOPHEN 325 MG/1
650 TABLET ORAL EVERY 4 HOURS PRN
Status: DISCONTINUED | OUTPATIENT
Start: 2024-09-19 | End: 2024-09-17 | Stop reason: HOSPADM

## 2024-09-16 RX ADMIN — ONDANSETRON 4 MG: 2 INJECTION INTRAMUSCULAR; INTRAVENOUS at 09:49

## 2024-09-16 RX ADMIN — ACETAMINOPHEN 975 MG: 325 TABLET ORAL at 06:25

## 2024-09-16 RX ADMIN — FENTANYL CITRATE 50 MCG: 50 INJECTION INTRAMUSCULAR; INTRAVENOUS at 09:23

## 2024-09-16 RX ADMIN — FENTANYL CITRATE 100 MCG: 50 INJECTION INTRAMUSCULAR; INTRAVENOUS at 08:02

## 2024-09-16 RX ADMIN — SODIUM CHLORIDE, POTASSIUM CHLORIDE, SODIUM LACTATE AND CALCIUM CHLORIDE: 600; 310; 30; 20 INJECTION, SOLUTION INTRAVENOUS at 08:02

## 2024-09-16 RX ADMIN — ACETAMINOPHEN 975 MG: 325 TABLET ORAL at 21:08

## 2024-09-16 RX ADMIN — Medication 10 MG: at 08:16

## 2024-09-16 RX ADMIN — PROPOFOL 200 MG: 10 INJECTION, EMULSION INTRAVENOUS at 08:09

## 2024-09-16 RX ADMIN — SENNOSIDES AND DOCUSATE SODIUM 1 TABLET: 50; 8.6 TABLET ORAL at 21:08

## 2024-09-16 RX ADMIN — DEXAMETHASONE SODIUM PHOSPHATE 4 MG: 4 INJECTION, SOLUTION INTRA-ARTICULAR; INTRALESIONAL; INTRAMUSCULAR; INTRAVENOUS; SOFT TISSUE at 08:13

## 2024-09-16 RX ADMIN — FENTANYL CITRATE 50 MCG: 50 INJECTION INTRAMUSCULAR; INTRAVENOUS at 09:36

## 2024-09-16 RX ADMIN — LIDOCAINE HYDROCHLORIDE 100 MG: 20 INJECTION, SOLUTION INFILTRATION; PERINEURAL at 08:09

## 2024-09-16 RX ADMIN — SODIUM CHLORIDE: 9 INJECTION, SOLUTION INTRAVENOUS at 12:27

## 2024-09-16 RX ADMIN — ATORVASTATIN CALCIUM 80 MG: 80 TABLET, FILM COATED ORAL at 21:08

## 2024-09-16 RX ADMIN — DIPHENHYDRAMINE HYDROCHLORIDE 12.5 MG: 25 SOLUTION ORAL at 23:52

## 2024-09-16 RX ADMIN — METOPROLOL SUCCINATE 50 MG: 50 TABLET, EXTENDED RELEASE ORAL at 21:08

## 2024-09-16 RX ADMIN — AMPICILLIN SODIUM 2 G: 2 INJECTION, POWDER, FOR SOLUTION INTRAMUSCULAR; INTRAVENOUS at 08:02

## 2024-09-16 RX ADMIN — PHENYLEPHRINE HYDROCHLORIDE 0.5 MCG/KG/MIN: 10 INJECTION INTRAVENOUS at 08:13

## 2024-09-16 RX ADMIN — AMPICILLIN SODIUM 2 G: 2 INJECTION, POWDER, FOR SOLUTION INTRAMUSCULAR; INTRAVENOUS at 21:09

## 2024-09-16 RX ADMIN — AMPICILLIN SODIUM 2 G: 2 INJECTION, POWDER, FOR SOLUTION INTRAMUSCULAR; INTRAVENOUS at 14:20

## 2024-09-16 RX ADMIN — GENTAMICIN SULFATE 360 MG: 40 INJECTION, SOLUTION INTRAMUSCULAR; INTRAVENOUS at 06:42

## 2024-09-16 RX ADMIN — ACETAMINOPHEN 975 MG: 325 TABLET ORAL at 12:27

## 2024-09-16 RX ADMIN — PROPOFOL 25 MCG/KG/MIN: 10 INJECTION, EMULSION INTRAVENOUS at 08:10

## 2024-09-16 ASSESSMENT — ACTIVITIES OF DAILY LIVING (ADL)
ADLS_ACUITY_SCORE: 20
ADLS_ACUITY_SCORE: 21
ADLS_ACUITY_SCORE: 20
ADLS_ACUITY_SCORE: 21
ADLS_ACUITY_SCORE: 35
ADLS_ACUITY_SCORE: 21
ADLS_ACUITY_SCORE: 20
ADLS_ACUITY_SCORE: 21
ADLS_ACUITY_SCORE: 20
ADLS_ACUITY_SCORE: 21
ADLS_ACUITY_SCORE: 20

## 2024-09-16 ASSESSMENT — ENCOUNTER SYMPTOMS
DYSRHYTHMIAS: 0
SEIZURES: 0

## 2024-09-16 ASSESSMENT — COLUMBIA-SUICIDE SEVERITY RATING SCALE - C-SSRS
2. HAVE YOU ACTUALLY HAD ANY THOUGHTS OF KILLING YOURSELF IN THE PAST MONTH?: NO
1. IN THE PAST MONTH, HAVE YOU WISHED YOU WERE DEAD OR WISHED YOU COULD GO TO SLEEP AND NOT WAKE UP?: NO
6. HAVE YOU EVER DONE ANYTHING, STARTED TO DO ANYTHING, OR PREPARED TO DO ANYTHING TO END YOUR LIFE?: NO

## 2024-09-16 ASSESSMENT — LIFESTYLE VARIABLES: TOBACCO_USE: 1

## 2024-09-16 NOTE — ANESTHESIA CARE TRANSFER NOTE
Patient: Sharad Yi    Procedure: Procedure(s):  ENUCLEATION, PROSTATE, USING HOLMIUM LASER,       Diagnosis: Benign prostatic hyperplasia with incomplete bladder emptying [N40.1, R39.14]  Diagnosis Additional Information: No value filed.    Anesthesia Type:   General     Note:    Oropharynx: oropharynx clear of all foreign objects  Level of Consciousness: awake  Oxygen Supplementation: face mask  Level of Supplemental Oxygen (L/min / FiO2): 6  Independent Airway: airway patency satisfactory and stable  Dentition: dentition unchanged  Vital Signs Stable: post-procedure vital signs reviewed and stable  Report to RN Given: handoff report given  Patient transferred to: PACU    Handoff Report: Identifed the Patient, Identified the Reponsible Provider, Reviewed the pertinent medical history, Discussed the surgical course, Reviewed Intra-OP anesthesia mangement and issues during anesthesia, Set expectations for post-procedure period and Allowed opportunity for questions and acknowledgement of understanding      Electronically Signed By: CHOLO Yap CRNA  September 16, 2024  10:05 AM

## 2024-09-16 NOTE — PLAN OF CARE
Patient VSS are at baseline - pending, slightly hypertensive     Patient able to ambulate as they were prior to admission or with assist devices provided by therapies during their stay - met     Patient able to tolerate oral intake and maintain hydration status - met     Patient has adequate pain control using oral analgesics - met, denies pain     Patient must be voiding adequate amounts - pending on CBI    Hypercapnia, Hypoventilation or hypoxia resolved for at least 2 hours without supplemental oxygen - met     Deficits in sensation, mobility or coordination have resolved if spinal or regional anesthesia used - met     Patient has returned to baseline mental status - met

## 2024-09-16 NOTE — ANESTHESIA POSTPROCEDURE EVALUATION
Patient: Sharad Yi    Procedure: Procedure(s):  ENUCLEATION, PROSTATE, USING HOLMIUM LASER,       Anesthesia Type:  General    Note:  Disposition: Inpatient   Postop Pain Control: Uneventful            Sign Out: Well controlled pain   PONV: No   Neuro/Psych: Uneventful            Sign Out: Acceptable/Baseline neuro status   Airway/Respiratory: Uneventful            Sign Out: Acceptable/Baseline resp. status   CV/Hemodynamics: Uneventful            Sign Out: Acceptable CV status   Other NRE: NONE   DID A NON-ROUTINE EVENT OCCUR?            Last vitals:  Vitals Value Taken Time   /94 09/16/24 1115   Temp 36.7  C (98  F) 09/16/24 1115   Pulse 74 09/16/24 1115   Resp 17 09/16/24 1115   SpO2 98 % 09/16/24 1116   Vitals shown include unfiled device data.    Electronically Signed By: Francisco Javier Flores MD  September 16, 2024  2:17 PM

## 2024-09-16 NOTE — ANESTHESIA PROCEDURE NOTES
Airway       Patient location during procedure: OR       Procedure Start/Stop Times: 9/16/2024 8:10 AM  Staff -        CRNA: Randy Catherine APRN CRNA       Performed By: CRNA  Consent for Airway        Urgency: elective  Indications and Patient Condition       Indications for airway management: jason-procedural       Induction type:intravenous       Mask difficulty assessment: 1 - vent by mask    Final Airway Details       Final airway type: supraglottic airway    Supraglottic Airway Details        Type: LMA       Brand: Ambu AuraGain       LMA size: 5    Post intubation assessment        Placement verified by: capnometry, equal breath sounds and chest rise        Number of attempts at approach: 1       Ease of procedure: easy       Dentition: Intact and Unchanged    Medication(s) Administered   Medication Administration Time: 9/16/2024 8:10 AM

## 2024-09-16 NOTE — BRIEF OP NOTE
[facility] Brief Operative Note    Pre-operative diagnosis: Benign prostatic hyperplasia with incomplete bladder emptying [N40.1, R39.14]   Post-operative diagnosis * No post-op diagnosis entered *   Procedure: Procedure(s):  ENUCLEATION, PROSTATE, USING HOLMIUM LASER,   Surgeon: Dasia Kennedy MD   Assistants(s): none   Estimated blood loss: 20 ml    Specimens: Prostate tissue   Findings: No bladder stone found, he must have passed it. 55 grams tissue removed, trilobar hypertrophy, moderate trabeculations,

## 2024-09-16 NOTE — DISCHARGE INSTRUCTIONS
Caring for Yourself after HoLEP  Holmium Laser Enucleation of the Prostate at [facility]    For the first 24 hours:  - do not drive of operate any heavy equipment  -do not make complex decisions of sign legal documents  -do not drink alcohol  -ask for supervision when cooking, caring for infants or doing other activities that could cause harm to others  -you may have some nausea and vomiting    As you heal:  - start eating slowly. Start with sips of liquids, then add solid food when ou can handle it. If you do not feel like eating solids, have liquids.  -drink up to 2.5 to 3 liters per day (10 to 12 cups). Having plenty of fluids will hep you stay hydrated. This can make peeing more comfortable. It will also keep blood clots from forming in your bladder.  -if you were taking prostate medicines before surgery, you may stop taking them (unless we tell you differently).  -if you had to stop taking blood thinners before surgery, your care team will tell you when to start taking then again    Urinating:  -for the next few days, you may feel stinging or burning when you pee. This type of pain is not usually helped by narcotic pain medicine  -you may feel a sense of urgency to reach the bathroom  -you may leak urine before being able to reach the bathroom. This leakage can be bothersome and can last up to 3 months after surgery.   -if leaking is not getting better by 6 weeks after surgery, we may suggest physical therapy to learn exercises that help with urinary control   -it is rare for leaking to last a year or more  -for the first 7 to 10 days after surgery, you may see flecks of tissues or scabs in your urine  -for the first 4 weeks after surgery, you may see blood at the start or end of your urine stream. It can come and go    Physical activity:  -you can resume normal physical activity (such as walking, flights of stars, driving) after surgery  -wait 2 weeks before doing aerobic activities  -wait 4 weeks  "before doing straddle activities, such as biking, lawn mowing, motorcycle, snowmobiling, etc.  -wait 4 weeks before having sexual activity. You may resume sexual activity when your urine looks clear   -you likely will not see fluid expelled from the penis when you have sex (\"dry orgasm\"). This is not a dangerous condition. Your body still makes seminal fluid but as a result of surgery, it  will be pushed into the bladder rather than out of the penis at the time of climax. You will pass the fluid the next time you pee.  -if you notice more blood in your urine as you resume a physical activity, stop that activity and take things more slowly    Pelvic Floor Exercises  Your pelvic floor muscles are located between your pubic bone and tailbone. These muscles support your bladder and rectum, helping you control when you pee, have bowel movements, and also playing a role in sexual function.  If you have an enlarged prostate (BPH), your pelvic floor muscles don't need to work as hard to stop urine from leaking. If these muscles aren't used, they can weaken, and your bladder may need to work harder.  After HoLEP surgery, your pelvic floor muscles might be too weak to support your bladder, which could lead to urine leakage. Doing pelvic floor exercises, like Kegel exercises, can strengthen these muscles and help you regain control over your bladder. These exercises can also improve sexual function for some men.  How to do Kegel exercises:  While urinating, try to stop the flow of urine by squeezing your pelvic floor muscles.  While sitting in a chair, squeeze your muscles to lift your scrotum up toward your belly button.  Squeeze your muscles like you're trying to stop yourself from passing gas.  Once you know how to tighten your pelvic floor muscles, you can start doing exercises to strengthen them.  Steps to start:  Lie on your back with your legs relaxed and supported.  Squeeze and relax your pelvic floor muscles.  Once " you're comfortable doing this while lying down, try doing the exercises while sitting or standing.  Tips for doing Kegel exercises:  Don't hold your breath.  Avoid squeezing your buttocks, thighs, or stomach muscles.  Relax completely between each squeeze.  Follow the  rule of 5's :  Do 5 Kegel exercises, 5 times a day.  Hold each squeeze for 5 seconds.  Focus on doing the exercises correctly, rather than doing a lot of them. It may take some time to notice an improvement in your bladder control, but start as soon as possible after surgery to strengthen your pelvic floor and regain control over your bladder.    Follow up exam:  You will need a check-up in the clinic in 6 to 8 weeks. This is to see how well you are healing.    When should I call the [facility]?  Call the urology clinic if your have any of these signs and symptoms:  -not able to pee. If you are not able to reach a nurse or doctor within 60 minutes, go to the emergency room  -fever over 101.5 F (38.6 C) along with sweats and chills  -bright red blood in urine or large blood clots that make it hard to pee  -bad pain that doesn't get better with pain medicines  -leg pain  -nausea or vomiting that is bad or lasts beyond that first day    How do I call the clinic?  [phone]  Open Monday through Friday, 7am to 7pm.  If calling after hours, ask for the urologist on call    For information purposes only. Not to replace the advice of your health care provider. Text adapted from [facility] under Creative Commons License (CC BY-NC-SA 4.0). Copyright C 2020 [employer]. All rights reserved. Clinically reviewed by Randy Ludwig MD, Department of Urology. R&L [id number]/20

## 2024-09-16 NOTE — OP NOTE
Operative Report  9/16/2024    PREOPERATIVE DIAGNOSIS:  Prostatic hypertrophy with lower urinary tract symptoms  POSTOPERATIVE DIAGNOSIS: Same as above    PROCEDURE PERFORMED: Holmium laser enucleation of the prostate  STAFF SURGEON: Dasia Kennedy MD was present and participatory for the entire case.   RESIDENT(S): None  FELLOW: Dasia Kennedy MD    FINDINGS: Approximately 70 gm prostate with trilobar hypertrophy. Bladder with moderate trabeculation  ANESTHESIA: General  INTRAVENOUS FLUIDS: See anesthesia records  ESTIMATED BLOOD LOSS: 20 cc   SPECIMENS: Prostate adenoma  DRAINS: 22-Azeri 3-way catheter with 60 ml in balloon     INDICATIONS FOR PROCEDURE: Sharad Yi is a(n) 74 year old male who was seen in consultation for weak urinary stream, incomplete emptying, bladder stones and enlarged prostate.  He has elected treatment with laser enucleation. Prostate volume estimated to be 70 grams. After discussion of the risks, benefits and alternatives of the procedure, the patient agreed to proceed with the above stated procdure.    DESCRIPTION OF PROCEDURE: After obtaining informed consent, the patient was taken to the operating room and placed under general anesthesia.  He was repositioned in dorsal lithotomy making sure that the legs were positioned and padded safely.  He was then prepped and draped in standard sterile fashion.  Culture directed antibiotics were administered and bilateral sequential compression devices were placed.  A time out was performed confirming the appropriate patient identity and planned procedure.     The urethra was injected with lubricant jelly and was calibrated with sounds from 22 fr to 30fr sequentially in 2 fr increments.  These passed easily, and then 26 fr sheath was placed with the obturator.  The bladder was unremarkable.  The ureteral orifices were orthotopic.  The prostate had Trilobar anatomy with outlet obstruction.    We began enucleation by making an  apical incision adjacent to the veromontanum.  We developed the apical plane on the left side and carried this laterally until 3 oclock.  We then proceeded to make a counter incision in the same fashion on the right side.  We next dissected the prostate out in a circumferential fashion, coming over the top of the gland and entering the bladder neck.  We next identified the mucosal strip anteriorly and transected it with the laser.  We then proceeded to take down the remaining lateral and posterior attachments which allowed us to push the adenoma in an en-bloc fashion into the bladder. Total enucleation time was 60 minutes    At this point there was a moderate amount of bleeding and approximately 15 minutes were spent identifying bleeding vessels in the fossa and coagulating them with the laser.  Once hemostasis was adequate we switched from the laser resectoscope to the 26F offset telescope with the Karmasphereanha morcellation device.  We added an extra inflow to distend the bladder.  The blades were adjusted and set at a rate of 1500 RPM.  We proceeded with morcellation making sure that we morcellated the entirety of the adenoma.  Total morcellation time was 7 minutes.     We then switched back to the laser resectoscope one final time to ensure that no residual tissue was left in the bladder.  We also confirmed that the bladder was unharmed from morcellation and that both ureteral orifices were unharmed during the procedure.  We inspected the fossa and obtained final hemostasis.   We looked the scope out noting that the sphincter was completely intact without evidence of thermal or mechanical injury.     We lubricated the urethra again and passed a 22F 3-way acevedo catheter without catheter guide. We filled the balloon with 60  ml of sterile water and did place the catheter on gentle traction.  The patient was woken from anesthesia and taken to the recovery room in stable condition.     The specimen was weighed and found  to be approximately 55 g.     POSTOPERATIVE PLAN:   -We will monitor the patient post operatively on continuous bladder irrigation for signs of ongling bleeding.  If clear we will consider discharge with acevedo removal as an outpatient.  If continues to have ongoing bleeding concerning for clot formation without bladder irrigation will plan to admit for observation  -Void trial in :AM  -Labs CBC/BMP in AM  -Home Antibiotics Augmentin 875 BID 5 days

## 2024-09-16 NOTE — INTERVAL H&P NOTE
No interval changes to H&P  OR for holep and bladder stone removal  We discussed the associated risks of this procedure included but not limited to the following:  -Bleeding, potentially significant enough to require clot evacuation and blood transfusion  -Infection, for which we will plan to treat preoperatively based on targeted antibiotic therapy  -Damage to the bladder, urethra and penis including the risk of urethral stricture and bladder neck contracture  -Risk of incidentally discovered prostate cancer.  We discussed that this would not preclude him from further therapy though it could prolong recovery and potentially increase risk of complications associated with cancer treatment.  Further preoperative workup to assess for prostate cancer prior to surgery was offered but patient deferred.  -Risk of retrograde ejaculation which would be expected to occur in the majority if not all men after HoLEP  -Risk of urinary incontinence.  We discussed that in the majority of men this is a transient process that is generally self limited to the first 6-12 weeks after surgery though could take longer to resolve depending on baseline bladder instability.  -Risk of postperative urinary retention though in published series HoLEP has been found to be associated with high success rates of achieving spontaneous voiding even in men with underactive and atonic bladders.

## 2024-09-16 NOTE — ANESTHESIA PREPROCEDURE EVALUATION
Anesthesia Pre-Procedure Evaluation    Patient: Sharad Yi   MRN: [id number] : 1950        Procedure : Procedure(s):  ENUCLEATION, PROSTATE, USING HOLMIUM LASER,  CYSTOSCOPY REMOVAL OF BLADDER STONE USING HOLMIUM LASER          Past Medical History:   Diagnosis Date    Arthritis     BPH (benign prostatic hyperplasia)     Chronic knee pain     Hypertension       Past Surgical History:   Procedure Laterality Date    COLONOSCOPY  2016    MENISCECTOMY Right     ORTHOPEDIC SURGERY  Oct 2019, 2022    [facility] APPENDECTOMY      Description: Appendectomy;  Recorded: 2013;      No Known Allergies   Social History     Tobacco Use    Smoking status: Former     Current packs/day: 0.00     Average packs/day: 0.5 packs/day for 2.2 years (1.1 ttl pk-yrs)     Types: Cigarettes     Start date: 1973     Quit date: 1975     Years since quittin.2     Passive exposure: Past    Smokeless tobacco: Never    Tobacco comments:     In college   Substance Use Topics    Alcohol use: Yes     Alcohol/week: 10.0 standard drinks of alcohol     Types: 10 Standard drinks or equivalent per week     Comment: not everyday, occassional      Wt Readings from Last 1 Encounters:   24 121.1 kg (266 lb 14.4 oz)        Anesthesia Evaluation            ROS/MED HX  ENT/Pulmonary:     (+) sleep apnea (Recent diagnosis, no CPAP yet), doesn't use CPAP,              tobacco use, Past use,                    (-) asthma and recent URI   Neurologic:    (-) no seizures, no CVA and no TIA   Cardiovascular:     (+) Dyslipidemia hypertension- -   -  - -                                   (-) arrhythmias   METS/Exercise Tolerance: >4 METS    Hematologic:     (+) History of blood clots (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, last dose ),    pt is anticoagulated,           Musculoskeletal:   (+)  arthritis (s/p R-CANDIDO),             GI/Hepatic:    (-) GERD and liver disease   Renal/Genitourinary:     (+)       Nephrolithiasis , BPH,      Endo:    "  (+)               Obesity (BMI 40),       Psychiatric/Substance Use:       Infectious Disease:    (-) Recent Fever   Malignancy:       Other:            Physical Exam    Airway        Mallampati: III   TM distance: > 3 FB   Neck ROM: full   Mouth opening: > 3 cm    Respiratory Devices and Support         Dental       (+) Minor Abnormalities - some fillings, tiny chips      Cardiovascular          Rhythm and rate: regular and normal     Pulmonary           breath sounds clear to auscultation           OUTSIDE LABS:  CBC:   Lab Results   Component Value Date    WBC 5.5 09/09/2024    WBC 7.5 07/09/2023    HGB 14.0 09/09/2024    HGB 14.3 07/09/2023    HCT 42.0 09/09/2024    HCT 42.3 07/09/2023     09/09/2024     07/09/2023     BMP:   Lab Results   Component Value Date     09/09/2024     07/09/2023    POTASSIUM 4.5 09/09/2024    POTASSIUM 4.4 07/09/2023    CHLORIDE 106 09/09/2024    CHLORIDE 106 07/09/2023    CO2 26 09/09/2024    CO2 24 07/09/2023    BUN 17.8 09/09/2024    BUN 13 07/09/2023    CR 0.97 09/09/2024    CR 1.0 03/13/2024     (H) 09/09/2024     07/09/2023     COAGS:   Lab Results   Component Value Date    PTT <20 (L) 07/08/2023    INR 0.98 07/08/2023     POC: No results found for: \"BGM\", \"HCG\", \"HCGS\"  HEPATIC:   Lab Results   Component Value Date    ALBUMIN 3.6 07/08/2023    PROTTOTAL 6.9 07/08/2023    ALT 23 07/08/2023    AST 18 07/08/2023    ALKPHOS 64 07/08/2023    BILITOTAL 0.9 07/08/2023     OTHER:   Lab Results   Component Value Date    BRIANDA 9.2 09/09/2024       Anesthesia Plan    ASA Status:  3    NPO Status:  NPO Appropriate    Anesthesia Type: General.     - Airway: LMA   Induction: Intravenous, Propofol.   Maintenance: Inhalation.        Consents    Anesthesia Plan(s) and associated risks, benefits, and realistic alternatives discussed. Questions answered and patient/representative(s) expressed understanding.     - Discussed: Risks, Benefits and Alternatives " "for the PROCEDURE were discussed     - Discussed with:  Patient            Postoperative Care    Pain management: IV analgesics, Oral pain medications, Multi-modal analgesia.   PONV prophylaxis: Ondansetron (or other 5HT-3), Dexamethasone or Solumedrol     Comments:               Mile Damon MD    I have reviewed the pertinent notes and labs in the chart from the past 30 days and (re)examined the patient.  Any updates or changes from those notes are reflected in this note.            # Drug Induced Coagulation Defect: home medication list includes an anticoagulant medication   # Severe Obesity: Estimated body mass index is 40.58 kg/m  as calculated from the following:    Height as of this encounter: 1.727 m (5' 8\").    Weight as of this encounter: 121.1 kg (266 lb 14.4 oz).      "

## 2024-09-17 VITALS
WEIGHT: 266.9 LBS | TEMPERATURE: 97.6 F | SYSTOLIC BLOOD PRESSURE: 146 MMHG | HEIGHT: 68 IN | HEART RATE: 72 BPM | OXYGEN SATURATION: 97 % | DIASTOLIC BLOOD PRESSURE: 88 MMHG | RESPIRATION RATE: 18 BRPM | BODY MASS INDEX: 40.45 KG/M2

## 2024-09-17 LAB
ANION GAP SERPL CALCULATED.3IONS-SCNC: 7 MMOL/L (ref 7–15)
BUN SERPL-MCNC: 12.5 MG/DL (ref 8–23)
CALCIUM SERPL-MCNC: 8.8 MG/DL (ref 8.8–10.4)
CHLORIDE SERPL-SCNC: 104 MMOL/L (ref 98–107)
CREAT SERPL-MCNC: 0.81 MG/DL (ref 0.67–1.17)
EGFRCR SERPLBLD CKD-EPI 2021: >90 ML/MIN/1.73M2
ERYTHROCYTE [DISTWIDTH] IN BLOOD BY AUTOMATED COUNT: 13.6 % (ref 10–15)
GLUCOSE SERPL-MCNC: 101 MG/DL (ref 70–99)
HCO3 SERPL-SCNC: 27 MMOL/L (ref 22–29)
HCT VFR BLD AUTO: 39.4 % (ref 40–53)
HGB BLD-MCNC: 12.9 G/DL (ref 13.3–17.7)
MCH RBC QN AUTO: 31.2 PG (ref 26.5–33)
MCHC RBC AUTO-ENTMCNC: 32.7 G/DL (ref 31.5–36.5)
MCV RBC AUTO: 95 FL (ref 78–100)
PLATELET # BLD AUTO: 193 10E3/UL (ref 150–450)
POTASSIUM SERPL-SCNC: 4 MMOL/L (ref 3.4–5.3)
RBC # BLD AUTO: 4.13 10E6/UL (ref 4.4–5.9)
SODIUM SERPL-SCNC: 138 MMOL/L (ref 135–145)
WBC # BLD AUTO: 8.6 10E3/UL (ref 4–11)

## 2024-09-17 PROCEDURE — 250N000011 HC RX IP 250 OP 636: Performed by: STUDENT IN AN ORGANIZED HEALTH CARE EDUCATION/TRAINING PROGRAM

## 2024-09-17 PROCEDURE — 36415 COLL VENOUS BLD VENIPUNCTURE: CPT | Performed by: STUDENT IN AN ORGANIZED HEALTH CARE EDUCATION/TRAINING PROGRAM

## 2024-09-17 PROCEDURE — 85027 COMPLETE CBC AUTOMATED: CPT | Performed by: STUDENT IN AN ORGANIZED HEALTH CARE EDUCATION/TRAINING PROGRAM

## 2024-09-17 PROCEDURE — 82374 ASSAY BLOOD CARBON DIOXIDE: CPT | Performed by: STUDENT IN AN ORGANIZED HEALTH CARE EDUCATION/TRAINING PROGRAM

## 2024-09-17 PROCEDURE — 250N000013 HC RX MED GY IP 250 OP 250 PS 637: Performed by: STUDENT IN AN ORGANIZED HEALTH CARE EDUCATION/TRAINING PROGRAM

## 2024-09-17 RX ORDER — FUROSEMIDE 10 MG/ML
10 INJECTION INTRAMUSCULAR; INTRAVENOUS ONCE
Status: COMPLETED | OUTPATIENT
Start: 2024-09-17 | End: 2024-09-17

## 2024-09-17 RX ADMIN — SENNOSIDES AND DOCUSATE SODIUM 1 TABLET: 50; 8.6 TABLET ORAL at 09:57

## 2024-09-17 RX ADMIN — AMPICILLIN SODIUM 2 G: 2 INJECTION, POWDER, FOR SOLUTION INTRAMUSCULAR; INTRAVENOUS at 14:03

## 2024-09-17 RX ADMIN — ACETAMINOPHEN 975 MG: 325 TABLET ORAL at 14:04

## 2024-09-17 RX ADMIN — AMPICILLIN SODIUM 2 G: 2 INJECTION, POWDER, FOR SOLUTION INTRAMUSCULAR; INTRAVENOUS at 02:41

## 2024-09-17 RX ADMIN — AMPICILLIN SODIUM 2 G: 2 INJECTION, POWDER, FOR SOLUTION INTRAMUSCULAR; INTRAVENOUS at 09:59

## 2024-09-17 RX ADMIN — FUROSEMIDE 10 MG: 10 INJECTION, SOLUTION INTRAMUSCULAR; INTRAVENOUS at 10:19

## 2024-09-17 RX ADMIN — POLYETHYLENE GLYCOL 3350 17 G: 17 POWDER, FOR SOLUTION ORAL at 09:57

## 2024-09-17 RX ADMIN — ACETAMINOPHEN 975 MG: 325 TABLET ORAL at 04:45

## 2024-09-17 ASSESSMENT — ACTIVITIES OF DAILY LIVING (ADL)
ADLS_ACUITY_SCORE: 23
ADLS_ACUITY_SCORE: 21
ADLS_ACUITY_SCORE: 23
ADLS_ACUITY_SCORE: 21
ADLS_ACUITY_SCORE: 23
ADLS_ACUITY_SCORE: 23
ADLS_ACUITY_SCORE: 21
ADLS_ACUITY_SCORE: 23
ADLS_ACUITY_SCORE: 23

## 2024-09-17 NOTE — PLAN OF CARE
Goal Outcome Evaluation:       Date & Time: 09/17/2024  Surgery/POD#: POD #1Holmium laser enucleation of the prostate  Behavior & Aggression: Calm and cooperative   Fall Risk: Yes  Orientation:AOx4  ABNL VS/O2:HTN, RA  ABNL Labs: NA  Pain Management:Scheduled tylenol for perineal pain   Bowel/Bladder: No BM, passing gas , acevedo cath.   Drains: Acevedo   Wounds/incisions: NA  Diet: Reg  Activity Level: SBA  Tests/Procedures: TOV 09/17  Anticipated  DC Date: 09/17  Significant Information:         Urine in acevedo tube still red and not pink. Will keep clamped, but did not pull acevedo.

## 2024-09-17 NOTE — PROGRESS NOTES
Spoke to pt on phone  Doing well  CBI clamped but urine red so not removed  Not drinking much fluid  Awaiting to be seen by urology team

## 2024-09-17 NOTE — PROGRESS NOTES
[facility] Urology Progress Note          Assessment and Plan:     Active Problems:    BPH (benign prostatic hyperplasia)    Assessment: POD 1 HoLEP    Plan:   Ambulate, IS, DONIS  TOV this AM.   Follow-up PVR.  Discharge instructions reviewed. Return precautions reviewed.    Dispo: pending TOV.      Yessenia Mazariegos PA-C  [facility]rology  Office: [phone]  After business hours: [phone]    ADDENDUM: Hand irrigated for several large clots. Urine grade 1 after 1L of irrigation. Will reassess in a bit.   ADDENDUM: TOV underway, passed a clot. PVR 80cc. Urine grade IV.  ADDENDUM:  2nd void, grade III.   ADDENDUM: Await 3rd void, pushing PO hydration. Check PVR. Should be ok for discharge. Reviewed with Dr. Kennedy.   ADDENDUM: Voided 100cc, PVR 25cc. Ok to discharge.               Interval History:     doing well. Tillman now clear.               Medications:     Current Facility-Administered Medications   Medication Dose Route Frequency Provider Last Rate Last Admin    [START ON 9/19/2024] acetaminophen (TYLENOL) tablet 650 mg  650 mg Oral Q4H PRN Dasia Kennedy MD        acetaminophen (TYLENOL) tablet 975 mg  975 mg Oral Q8H Dasia Kennedy MD   975 mg at 09/17/24 0445    ampicillin (OMNIPEN) 2 g vial to attach to  mL bag  2 g Intravenous Q6H Dasia Kennedy MD   2 g at 09/17/24 0959    atorvastatin (LIPITOR) tablet 80 mg  80 mg Oral At Bedtime Dasia Kennedy MD   80 mg at 09/16/24 2108    benzocaine-menthol (CHLORASEPTIC) 6-10 MG lozenge 1-2 lozenge  1-2 lozenge Buccal Q1H PRN Dasia Kennedy MD        bisacodyl (DULCOLAX) suppository 10 mg  10 mg Rectal Daily PRN Dasia Kennedy MD        calcium carbonate (TUMS) chewable tablet 500 mg  500 mg Oral BID PRN Dasia Kennedy MD        carboxymethylcellulose PF (REFRESH PLUS) 0.5 % ophthalmic solution 1 drop  1 drop Both Eyes Q1H PRN Dasia Kennedy MD        diphenhydrAMINE  (BENADRYL) elixir 12.5 mg  12.5 mg Oral Q6H PRN Dasia Kennedy MD   12.5 mg at 09/16/24 2352    Or    diphenhydrAMINE (BENADRYL) injection 12.5 mg  12.5 mg Intravenous Q6H PRN Dasia Kennedy MD        ibuprofen (ADVIL/MOTRIN) tablet 600 mg  600 mg Oral Q6H PRN Dasia Kennedy MD        lidocaine (LMX4) cream   Topical Q1H PRN Dasia Kennedy MD        lidocaine 1 % 0.1-1 mL  0.1-1 mL Other Q1H PRN Dasia Kennedy MD        magnesium hydroxide (MILK OF MAGNESIA) suspension 30 mL  30 mL Oral Daily PRN Dasia Kennedy MD        metoprolol succinate ER (TOPROL XL) 24 hr tablet 50 mg  50 mg Oral At Bedtime Dasia Kennedy MD   50 mg at 09/16/24 2108    naloxone (NARCAN) injection 0.2 mg  0.2 mg Intravenous Q2 Min PRDasia Escalona MD        Or    naloxone (NARCAN) injection 0.4 mg  0.4 mg Intravenous Q2 Min PRDasia Escalona MD        Or    naloxone (NARCAN) injection 0.2 mg  0.2 mg Intramuscular Q2 Min PRDasia Escalona MD        Or    naloxone (NARCAN) injection 0.4 mg  0.4 mg Intramuscular Q2 Min PRDasia Escalona MD        ondansetron (ZOFRAN ODT) ODT tab 4 mg  4 mg Oral Q6H PRN Dasia Kennedy MD        Or    ondansetron (ZOFRAN) injection 4 mg  4 mg Intravenous Q6H PRDasia Escalona MD        oxyCODONE (ROXICODONE) tablet 5 mg  5 mg Oral Q4H PRN Dasia Kennedy MD        Or    oxyCODONE (ROXICODONE) tablet 10 mg  10 mg Oral Q4H PRN Dasia Kennedy MD        polyethylene glycol (MIRALAX) Packet 17 g  17 g Oral Daily Dasia Kennedy MD   17 g at 09/17/24 0957    prochlorperazine (COMPAZINE) injection 5 mg  5 mg Intravenous Q6H PRN Dasia Kennedy MD        Or    prochlorperazine (COMPAZINE) tablet 5 mg  5 mg Oral Q6H PRN Dasia Kennedy MD        senna-docusate (SENOKOT-S/PERICOLACE) 8.6-50 MG per tablet 1 tablet  1 tablet Oral BID Dasia Kennedy MD   1  "tablet at 09/17/24 0957    simethicone (MYLICON) chewable tablet 80 mg  80 mg Oral 4x Daily PRN Dasia Kennedy MD        sodium chloride (PF) 0.9% PF flush 3 mL  3 mL Intracatheter Q8H Dasia Kennedy MD   3 mL at 09/17/24 0455    sodium chloride (PF) 0.9% PF flush 3 mL  3 mL Intracatheter q1 min prn Dasia Kennedy MD        sodium chloride 0.9% irrigation (bag)   Irrigation Continuous Dasia Kennedy MD   Restarted at 09/16/24 1155                  Physical Exam:   Vitals were reviewed  Patient Vitals for the past 8 hrs:   BP Temp Temp src Pulse Resp SpO2   09/17/24 0445 (!) 146/88 97.6  F (36.4  C) Temporal 72 18 97 %     GEN: NAD, lying in bed  EYES: EOMI  : Urine clear in prox tubing.              Data:   No results found for: \"NTBNPI\", \"NTBNP\"  Lab Results   Component Value Date    WBC 8.6 09/17/2024    WBC 5.5 09/09/2024    WBC 7.5 07/09/2023    HGB 12.9 (L) 09/17/2024    HGB 14.0 09/09/2024    HGB 14.3 07/09/2023    HCT 39.4 (L) 09/17/2024    HCT 42.0 09/09/2024    HCT 42.3 07/09/2023    MCV 95 09/17/2024    MCV 96 09/09/2024    MCV 93 07/09/2023     09/17/2024     09/09/2024     07/09/2023     Lab Results   Component Value Date    INR 0.98 07/08/2023         "

## 2024-09-17 NOTE — DISCHARGE SUMMARY
Discharge    Patient discharged to Home via Car with Brother  Care plan note Met    Listed belongings gathered and given to patient (including from security/pharmacy). Yes  Care Plan and Patient education resolved: Yes  Prescriptions if needed, hard copies sent with patient  NA  Medication Bin checked and emptied on discharge Yes  SW/care coordinator/charge RN aware of discharge: Yes

## 2024-09-17 NOTE — DISCHARGE SUMMARY
Urology   [facility] Discharge Summary    Sharad Yi MRN# [id number]   Age: 74 year old YOB: 1950     Date of Admission:  9/16/2024  Date of Discharge::  9/17/2024  Admitting Physician:  Daisa Kennedy MD  Discharge Physician:  Yessenia Mazariegos PA-C, NEHA           Admission Diagnoses:      Acute pulmonary embolism, unspecified pulmonary embolism type, unspecified whether acute cor pulmonale present (H)  Enlarged prostate          Discharge Diagnosis:     Same          Procedures:   HoLEP          Medications Prior to Admission:     Facility-Administered Medications Prior to Admission   Medication Dose Route Frequency Provider Last Rate Last Admin    betamethasone acet & sod phos (CELESTONE) injection 6 mg  6 mg   Ollie Mejía MD   6 mg at 08/04/22 0800     Medications Prior to Admission   Medication Sig Dispense Refill Last Dose    acetaminophen (TYLENOL) 500 MG tablet Take 1,000 mg by mouth every 6 hours as needed for mild pain ([id number]).   9/15/2024 at PRN    atorvastatin (LIPITOR) 80 MG tablet Take 1 tablet (80 mg) by mouth at bedtime Increase dose since LDL is more elevated this year. Recheck in one year 90 tablet 3 9/15/2024 at PM    Cholecalciferol (VITAMIN D3 PO) Take 400 Units by mouth daily.   9/15/2024 at PM    diphenhydrAMINE-acetaminophen (TYLENOL PM)  MG tablet Take 1 tablet by mouth nightly as needed for sleep.   9/15/2024 at PM    metoprolol succinate ER (TOPROL XL) 50 MG 24 hr tablet Take 1 tablet (50 mg) by mouth at bedtime 90 tablet 3 9/15/2024 at PM    [DISCONTINUED] amoxicillin-clavulanate (AUGMENTIN) 875-125 MG tablet Take 1 tablet by mouth 2 times daily for 7 days. 14 tablet 0 9/15/2024 at PM    [DISCONTINUED] apixaban ANTICOAGULANT (ELIQUIS) 5 MG tablet Take 1 tablet (5 mg) by mouth 2 times daily 180 tablet 3 9/13/2024 at PM    [DISCONTINUED] doxazosin (CARDURA) 4 MG tablet TAKE 1 TABLET(4 MG) BY MOUTH AT BEDTIME Strength: 4 mg 90  tablet 3 9/15/2024 at PM             Discharge Medications:     Current Discharge Medication List        START taking these medications    Details   phenazopyridine (PYRIDIUM) 200 MG tablet Take 1 tablet (200 mg) by mouth 3 times daily as needed (pain with urination).  Qty: 9 tablet, Refills: 0    Associated Diagnoses: Enlarged prostate      senna-docusate (SENOKOT-S/PERICOLACE) 8.6-50 MG tablet Take 1 tablet by mouth daily. To prevent constipation  Qty: 30 tablet, Refills: 0    Associated Diagnoses: Enlarged prostate           CONTINUE these medications which have CHANGED    Details   amoxicillin-clavulanate (AUGMENTIN) 875-125 MG tablet Take 1 tablet by mouth 2 times daily for 5 days.  Qty: 10 tablet, Refills: 0    Associated Diagnoses: Enlarged prostate      apixaban ANTICOAGULANT (ELIQUIS) 5 MG tablet Take 1 tablet (5 mg) by mouth 2 times daily.    Associated Diagnoses: Acute pulmonary embolism, unspecified pulmonary embolism type, unspecified whether acute cor pulmonale present (H)           CONTINUE these medications which have NOT CHANGED    Details   acetaminophen (TYLENOL) 500 MG tablet Take 1,000 mg by mouth every 6 hours as needed for mild pain ([id number]).      atorvastatin (LIPITOR) 80 MG tablet Take 1 tablet (80 mg) by mouth at bedtime Increase dose since LDL is more elevated this year. Recheck in one year  Qty: 90 tablet, Refills: 3    Associated Diagnoses: Hyperlipidemia, unspecified hyperlipidemia type      Cholecalciferol (VITAMIN D3 PO) Take 400 Units by mouth daily.      diphenhydrAMINE-acetaminophen (TYLENOL PM)  MG tablet Take 1 tablet by mouth nightly as needed for sleep.      metoprolol succinate ER (TOPROL XL) 50 MG 24 hr tablet Take 1 tablet (50 mg) by mouth at bedtime  Qty: 90 tablet, Refills: 3    Associated Diagnoses: Essential hypertension, benign           STOP taking these medications       doxazosin (CARDURA) 4 MG tablet Comments:   Reason for Stopping:                      Consultations:     None          Hospital Course:     The patient underwent the above procedure and tolerated this well. Uncomplicated post operative course. Had adequate pain control, voiding, ambulating and tolerating regular diet at the time of discharge.  Will hold Eliquis an additional day.           Discharge Instructions and Follow-Up:     Discharge diet: Regular   Discharge activity: No lifting >5lbs or strenuous exercise for 2 week(s)   Discharge follow-up: Dr. Kennedy                Discharge Disposition:     Discharged to home        Yessenia Mazariegos PA-C  [facility] Urology  Office: [phone]  After business hours: [phone]

## 2024-09-17 NOTE — CONSULTS
SPIRITUAL HEALTH SERVICES Consult Note  [facility]  Gen Surg    Reason for visit or referral: Introduction to Spiritual Health services.    Pt received communion this morning from EM. He declines offer of visit by  for the Sacrament of the Sick and indicates that he has no other Adventism or spiritual needs at this time.    Plan: No follow-up as pt is expected to discharge from hospital today.    Anshu Silva  Associate   [facility] Spiritual Health Phone Line [phone]  Spiritual Health Pager [phone]    [facility] available 24/7 for emergent requests/referrals, either by paging the on-call  or by entering an ASAP/STAT consult in [employer], which will also page the on-call .

## 2024-09-18 LAB
PATH REPORT.COMMENTS IMP SPEC: NORMAL
PATH REPORT.COMMENTS IMP SPEC: NORMAL
PATH REPORT.FINAL DX SPEC: NORMAL
PATH REPORT.GROSS SPEC: NORMAL
PATH REPORT.MICROSCOPIC SPEC OTHER STN: NORMAL
PATH REPORT.RELEVANT HX SPEC: NORMAL
PHOTO IMAGE: NORMAL

## 2024-10-11 ENCOUNTER — THERAPY VISIT (OUTPATIENT)
Dept: PHYSICAL THERAPY | Facility: REHABILITATION | Age: 74
End: 2024-10-11
Attending: STUDENT IN AN ORGANIZED HEALTH CARE EDUCATION/TRAINING PROGRAM
Payer: COMMERCIAL

## 2024-10-11 DIAGNOSIS — M62.89 PELVIC FLOOR DYSFUNCTION: ICD-10-CM

## 2024-10-11 DIAGNOSIS — N40.0 ENLARGED PROSTATE: ICD-10-CM

## 2024-10-11 PROCEDURE — 97110 THERAPEUTIC EXERCISES: CPT | Mod: GP | Performed by: PHYSICAL THERAPIST

## 2024-10-11 PROCEDURE — 97161 PT EVAL LOW COMPLEX 20 MIN: CPT | Mod: GP | Performed by: PHYSICAL THERAPIST

## 2024-10-11 NOTE — PROGRESS NOTES
PHYSICAL THERAPY EVALUATION  Type of Visit: Evaluation     Fall Risk Screen:  Fall screen completed by: PT  Have you fallen 2 or more times in the past year?: No  Have you fallen and had an injury in the past year?: No  Is patient a fall risk?: No    Subjective       Presenting condition or subjective complaint: Physical Therapy  Date of onset: 09/16/24    Relevant medical history: Arthritis; Bladder or bowel problems   Dates & types of surgery:  9/16/24 prostate surgery, 2022 R CANDIDO    Prior diagnostic imaging/testing results: MRI; CT scan     Prior therapy history for the same diagnosis, illness or injury: No      Prior Level of Function  Transfers: Independent  Ambulation: Independent  ADL: Independent      Living Environment  Social support: With family members   Type of home: House   Stairs to enter the home: Yes 3 Is there a railing: Yes     Ramp: No   Stairs inside the home: Yes 10 Is there a railing: Yes     Help at home: None  Equipment owned:       Employment: Not Applicable    Hobbies/Interests: Golf    Patient goals for therapy: Not urinate as frequently  Pt is urinating every 2 hours and sometimes when away from home.    Pain assessment: Pain denied in pelvic region. Pr reports he is having R lateral hip pain since early this year especially with standing or walking on pavement or asphalt. He is seeing ortho in 2 wks for hip injection.     Objective      PELVIC EVALUATION  ADDITIONAL HISTORY:  Sex assigned at birth: Male  Gender identity: Male    Pronouns: He/Him/His      Bladder History:  Feels bladder filling: Yes  Triggers for feeling of inability to wait to go to the bathroom: Yes 30 seconds  How long can you wait to urinate: Varies  Gets up at night to urinate: Yes 1  Can stop the flow of urine when urinating: No  Volume of urine usually released: Medium   Other issues: Dribbling after urinating as rising up  Number of bladder infections in last 12 months:  0  Fluid intake per day: 30 OZ 12 oz  Varies  Medications taken for bladder: No     Activities causing urine leak: Hurrying to the bathroom due to a strong urge to urinate (pee)    Amount of urine typically leaked: mimimal  Pads used to help with leaking: No        Bowel History:  Frequency of bowel movement: Daily  Consistency of stool: Soft    Ignores the urge to defecate: No  Other bowel issues: Loss of gas  Length of time spent trying to have a bowel movement:      Sexual Function History:  Sexual orientation: Straight    Sexually active: No  Lubrication used: No No  Pelvic pain:      Pain or difficulty with orgasms/erection/ejaculation: No    State of menopause:    Hormone medications: No      Have you tried pelvic floor strengthening exercises for 4 weeks: No  Do you have any history of trauma that is relevant to your care that you d like to share: No      Discussed reason for referral regarding pelvic health needs and external/internal pelvic floor muscle examination with patient/guardian.  Opportunity provided to ask questions and verbal consent for assessment and intervention was given.    PAIN: No resting pain.  Pt states he has a burning pain at the tip of the penis - 60% of the time.   Pt reports he has a sensation of incomplete emptying at times when voiding. Dribbling after rising from toilet.    POSTURE: R leg and pelvis long in standing    LUMBAR SCREEN: AROM WNL    HIP SCREEN:  S/p R CANDIDO, right hip flexion to 100 deg. R knee enlarged- DJD per pt.    PELVIC/SI SCREEN:  Standing Forward Bend: +    PELVIS/SI SPECIAL TESTS: R PSIS high, + R FB test. R ASIS low    BREATHING SYMMETRY: WNL    PELVIC EXAM  Palpation medial to ischial tuberosity shows first 2 pelvic floor contractions about 3/5 for 10 seconds, and then intensity drops markedly on 3rd rep.   Normal pelvic floor resting tone palpated in supine    DERMATOMES: WNL      Assessment & Plan   CLINICAL IMPRESSIONS  Medical Diagnosis: Pelvic floor dysfunction,    Treatment Diagnosis:  Pelvic floor dysfunction,   Impression/Assessment: Patient is a 74 year old male with urinary incontinence and frequency  complaints.  The following significant findings have been identified: Pain, Decreased joint mobility, Decreased strength, and Impaired muscle performance. These impairments interfere with their ability to perform self care tasks and recreational activities as compared to previous level of function.     Clinical Decision Making (Complexity):  Clinical Presentation: Stable/Uncomplicated  Clinical Presentation Rationale: based on medical and personal factors listed in PT evaluation  Clinical Decision Making (Complexity): Low complexity    PLAN OF CARE  Treatment Interventions:  Interventions: Manual Therapy, Neuromuscular Re-education, Therapeutic Exercise, Self-Care/Home Management    Long Term Goals     PT Goal 1  Goal Identifier: Incontinence  Goal Description: Pt will have no loss of continence with ADL's in 8 wks  Rationale: to maximize safety and independence with performance of ADLs and functional tasks;to maximize safety and independence within the community;to maximize safety and independence with self cares  Target Date: 01/09/25      Frequency of Treatment: 1x/wk  Duration of Treatment: 16 wks    Recommended Referrals to Other Professionals: none  Education Assessment:   Learner/Method: Patient;Listening;Reading;Demonstration;Pictures/Video;No Barriers to Learning    Risks and benefits of evaluation/treatment have been explained.   Patient/Family/caregiver agrees with Plan of Care.     Evaluation Time:     PT Eval, Low Complexity Minutes (40519): 30   Present: Not applicable     Signing Clinician: Enid Jones PT        [facility]                                                                                   OUTPATIENT PHYSICAL THERAPY      PLAN OF TREATMENT FOR OUTPATIENT REHABILITATION   Patient's Last Name, First Name, Sharad Siu  YOB: 1950   Provider's Name   [facility]   Medical Record No.  [id number]     Onset Date: 09/16/24  Start of Care Date: 10/11/24     Medical Diagnosis:  Pelvic floor dysfunction,      PT Treatment Diagnosis:  Pelvic floor dysfunction, Plan of Treatment  Frequency/Duration: 1x/wk/ 16 wks    Certification date from 10/11/24 to 01/09/25         See note for plan of treatment details and functional goals     Enid Jones, PT                         I CERTIFY THE NEED FOR THESE SERVICES FURNISHED UNDER        THIS PLAN OF TREATMENT AND WHILE UNDER MY CARE     (Physician attestation of this document indicates review and certification of the therapy plan).              Referring Provider:  Dasia Kennedy    Initial Assessment  See Epic Evaluation- Start of Care Date: 10/11/24

## 2024-10-19 NOTE — ED PROVIDER NOTES
EMERGENCY DEPARTMENT ENCOUNTER      NAME: Sharad Yi  AGE: 73 year old male  YOB: 1950  MRN: [id number]  EVALUATION DATE & TIME: 7/8/2023  9:29 AM    PCP: Leonardo Perez    ED PROVIDER: Anil Delgadillo MD        Chief Complaint   Patient presents with     Shortness of Breath         FINAL IMPRESSION:  1. Gallstones    2. Other acute pulmonary embolism without acute cor pulmonale (H)    3. Acute respiratory failure with hypoxia (H)          ED COURSE & MEDICAL DECISION MAKING:    Pertinent Labs & Imaging studies reviewed. (See chart for details)  73 year old male presents to the Emergency Department for evaluation of dizziness, shortness of breath, lightheadedness with exertion.  EMS reports O2 sats were 77 percent with walking 88% at rest.  Right knee was hurting him therefore he put his knee brace on.      Overall well-appearing in no apparent distress        ED Course as of 07/08/23 1302   Sat Jul 08, 2023   0950 Differential includes ACS, pulmonary emboli, pneumonia, aortic dissection, esophageal rupture, pneumothorax, pneumonia, malignancy, pleurisy, shingles, and GERD.  Based on history and examination pulmonary emboli and aortic dissection unlikely.      0950 Plan for EKG, CBC, CMP, proBNP, D-dimer, troponin, COVID-19, aspirin   1126 D-Dimer Quantitative(!): 7.34  Will obtain CTA-PE   1301 BNP: 14   1301 Troponin I: 0.01     With radiology CTA shows a large clot burden and bilateral pulmonary emboli but no RV strain.  Troponin and BNP normal.  Hypoxic with exertion.  Plan for admission for heparin infusion for further management of his large pulmonary emboli.  Also has gallstones.  Will obtain ultrasound imaging to evaluate for DVTs    Spoke with hospitalist who agrees to admit patient    9:50 AM  I met the patient and performed my initial interview and exam.  12:44 PM I spoke to Dr. May, hospitalist.      Medical Decision Making    History:    Supplemental history from: N/A    External  Record(s) reviewed: Documented in chart, if applicable.    Work Up:    Chart documentation includes differential considered and any EKGs or imaging independently interpreted by provider, where specified.    In additional to work up documented, I considered the following work up: Documented in chart, if applicable.    External consultation:    Discussion of management with another provider: Documented in chart, if applicable    Complicating factors:    Care impacted by chronic illness: Hyperlipidemia and Hypertension    Care affected by social determinants of health: N/A    Disposition considerations: Admit.          Voice recognition software was used in the creation of this note. Any grammatical or nonsensical errors are due to inherent errors with the software and are not the intention of the writer.         At the conclusion of the encounter I discussed the results of all of the tests and the disposition. The questions were answered. The patient or family acknowledged understanding and was agreeable with the care plan.     The patient is critically ill and has required 30 minutes of critical care time exclusive of procedures. This includes time spent interviewing the patient, ordering tests and medications, monitoring vital signs, reviewing results, patient updates, discussing the case with family and consultants, and admission.          MEDICATIONS GIVEN IN THE EMERGENCY:  Medications   enoxaparin ANTICOAGULANT (LOVENOX) injection 120 mg (has no administration in time range)   atorvastatin (LIPITOR) tablet 40 mg (has no administration in time range)   doxazosin (CARDURA) tablet 4 mg (has no administration in time range)   metoprolol succinate ER (TOPROL XL) 24 hr tablet 50 mg (has no administration in time range)   lidocaine 1 % 0.1-1 mL (has no administration in time range)   lidocaine (LMX4) cream (has no administration in time range)   sodium chloride (PF) 0.9% PF flush 3 mL (has no administration in time  "range)   sodium chloride (PF) 0.9% PF flush 3 mL (has no administration in time range)   melatonin tablet 1 mg (has no administration in time range)   Patient is already receiving anticoagulation with heparin, enoxaparin (LOVENOX), warfarin (COUMADIN)  or other anticoagulant medication (has no administration in time range)   aspirin (ASA) tablet 325 mg (325 mg Oral $Given 7/8/23 0904)   iopamidol (ISOVUE-370) solution 75 mL (75 mLs Intravenous $Given 7/8/23 1151)       NEW PRESCRIPTIONS STARTED AT TODAY'S ER VISIT  New Prescriptions    No medications on file          =================================================================    HPI    Triage note  \"  Pt called EMS for c/o sob, nausea and dizziness with exertion. EMS noted pt's O2 to be 77% while walking. At rest O2 88-91%. Denies chest pain. Denies any symptoms currently, only has the sob, nausea and dizziness when walking. States he noticed that his stamina has been worsening over the past year. No known medical problems besides hypertension and high cholesterol. A&O x4.      Triage Assessment     Row Name 07/08/23 0909       Triage Assessment (Adult)    Airway WDL WDL       Respiratory WDL    Respiratory WDL X  sob w/ exertion       Skin Circulation/Temperature WDL    Skin Circulation/Temperature WDL WDL       Cardiac WDL    Cardiac WDL WDL       Cognitive/Neuro/Behavioral WDL    Cognitive/Neuro/Behavioral WDL WDL              \"      Patient information was obtained from: patient     Use of : N/A        Sharad Yi is a 73 year old male with a pertinent history of HTN and HLD who presents to this ED by ambulance for evaluation of shortness of breath.     When the patient was walking to his car today (7/8), he became short of breath, dizzy, nauseous and warm. The patient also endorses light-headedness, right knee pain and a runny nose. He denies chest tightness, dyspnea, calf pain, fever, abdominal pain and heart palpitations. The patient " usually becomes short of breath with exertion but does not have difficulty breathing when laying flat. The patient was low on oxygen on arrival to the ED. He does not have a history of heart failure, blood clots, smoking or sleep apnea.       REVIEW OF SYSTEMS   Review of Systems   Respiratory: Positive for shortness of breath. Negative for apnea.         Negative for dyspnea.   Cardiovascular: Negative for palpitations.        Negative for chest tightness.   Gastrointestinal: Positive for nausea. Negative for abdominal pain.   Musculoskeletal:        Positive for right knee pain. Negative for calf pain.   Neurological: Positive for dizziness and light-headedness.   All other systems reviewed and are negative.      PAST MEDICAL HISTORY:  Past Medical History:   Diagnosis Date     BPH (benign prostatic hyperplasia)      Chronic knee pain        PAST SURGICAL HISTORY:  Past Surgical History:   Procedure Laterality Date     MENISCECTOMY Right      ZZC APPENDECTOMY      Description: Appendectomy;  Recorded: 02/05/2013;           CURRENT MEDICATIONS:    aspirin (ASA) 325 MG EC tablet  atorvastatin (LIPITOR) 40 MG tablet  betamethasone valerate (VALISONE) 0.1 % external cream  Cholecalciferol (VITAMIN D3 PO)  diphenhydrAMINE-acetaminophen (TYLENOL PM)  MG tablet  doxazosin (CARDURA) 4 MG tablet  metoprolol succinate ER (TOPROL XL) 50 MG 24 hr tablet        ALLERGIES:  No Known Allergies    FAMILY HISTORY:  Family History   Problem Relation Age of Onset     No Known Problems Mother      Prostate Cancer Father 57     Heart Disease Father 57     Diabetes Father        SOCIAL HISTORY:   Social History     Socioeconomic History     Marital status:      Number of children: 1   Occupational History     Occupation: DNR     Comment: Retired   Tobacco Use     Smoking status: Former     Smokeless tobacco: Never     Tobacco comments:     In college   Substance and Sexual Activity     Alcohol use: Yes     Alcohol/week:  "10.0 standard drinks of alcohol     Types: 10 Standard drinks or equivalent per week     Drug use: No     Sexual activity: Yes     Partners: Female       VITALS:  BP (!) 143/87   Pulse 112   Temp 97.5  F (36.4  C) (Oral)   Resp 16   Ht 1.727 m (5' 8\")   Wt 113.4 kg (250 lb)   SpO2 (!) 81%   BMI 38.01 kg/m      PHYSICAL EXAM      Vitals: BP (!) 143/87   Pulse 112   Temp 97.5  F (36.4  C) (Oral)   Resp 16   Ht 1.727 m (5' 8\")   Wt 113.4 kg (250 lb)   SpO2 (!) 81%   BMI 38.01 kg/m    General: Appears in no acute distress, awake, alert, interactive. Obese. Knee brace on right knee.   Eyes: Conjunctivae non-injected. Sclera anicteric.  HENT: Atraumatic.  Neck: Supple.  Respiratory/Chest: Respiration unlabored. No wheezing or crackles.  Heart: No murmur.    Abdomen: non distended. No tenderness.   Musculoskeletal: Normal extremities. No edema or erythema.  Skin: Normal color. No rash or diaphoresis.  Neurologic: Face symmetric, moves all extremities spontaneously. Speech clear.  Psychiatric: Oriented to person, place, and time. Affect appropriate.       LAB:  All pertinent labs reviewed and interpreted.  Results for orders placed or performed during the hospital encounter of 07/08/23   CT Chest Pulmonary Embolism w Contrast   Result Value Ref Range    Radiologist flags Bilateral pulmonary embolism. (AA)     Impression    IMPRESSION:  1.  Prominent bilateral pulmonary embolism burden leading to the distal portions of the right and left main pulmonary arteries. Mildly dilated main pulmonary artery root. Correlate with any evidence of cardiac strain.  2.  Mild patchy groundglass opacities in both lungs may be mild edema.  3.  Small cholelithiasis.      [Critical Result: Bilateral pulmonary embolism.]    Finding was identified on 07/08/2023 at 12:10 PM CDT.     1.  Dr. Delgadillo was contacted by me on 07/08/2023 at 12:15 PM CDT and verbalized understanding of the critical result.      Comprehensive metabolic panel "   Result Value Ref Range    Sodium 139 136 - 145 mmol/L    Potassium 4.2 3.5 - 5.0 mmol/L    Chloride 107 98 - 107 mmol/L    Carbon Dioxide (CO2) 22 22 - 31 mmol/L    Anion Gap 10 5 - 18 mmol/L    Urea Nitrogen 19 8 - 28 mg/dL    Creatinine 0.98 0.70 - 1.30 mg/dL    Calcium 9.0 8.5 - 10.5 mg/dL    Glucose 116 70 - 125 mg/dL    Alkaline Phosphatase 64 45 - 120 U/L    AST 18 0 - 40 U/L    ALT 23 0 - 45 U/L    Protein Total 6.9 6.0 - 8.0 g/dL    Albumin 3.6 3.5 - 5.0 g/dL    Bilirubin Total 0.9 0.0 - 1.0 mg/dL    GFR Estimate 81 >60 mL/min/1.73m2   D dimer quantitative   Result Value Ref Range    D-Dimer Quantitative 7.34 (H) 0.00 - 0.50 ug/mL FEU   Result Value Ref Range    Troponin I 0.01 0.00 - 0.29 ng/mL   B-Type Natriuretic Peptide ([facility] Only)   Result Value Ref Range    BNP 14 0 - 74 pg/mL   Symptomatic Influenza A/B, RSV, & SARS-CoV2 PCR (COVID-19) Nasopharyngeal    Specimen: Nasopharyngeal; Swab   Result Value Ref Range    Influenza A PCR Negative Negative    Influenza B PCR Negative Negative    RSV PCR Negative Negative    SARS CoV2 PCR Negative Negative   CBC with platelets and differential   Result Value Ref Range    WBC Count 6.1 4.0 - 11.0 10e3/uL    RBC Count 4.63 4.40 - 5.90 10e6/uL    Hemoglobin 14.6 13.3 - 17.7 g/dL    Hematocrit 43.4 40.0 - 53.0 %    MCV 94 78 - 100 fL    MCH 31.5 26.5 - 33.0 pg    MCHC 33.6 31.5 - 36.5 g/dL    RDW 13.7 10.0 - 15.0 %    Platelet Count 153 150 - 450 10e3/uL    % Neutrophils 58 %    % Lymphocytes 30 %    % Monocytes 9 %    % Eosinophils 3 %    % Basophils 0 %    % Immature Granulocytes 0 %    NRBCs per 100 WBC 0 <1 /100    Absolute Neutrophils 3.5 1.6 - 8.3 10e3/uL    Absolute Lymphocytes 1.8 0.8 - 5.3 10e3/uL    Absolute Monocytes 0.6 0.0 - 1.3 10e3/uL    Absolute Eosinophils 0.2 0.0 - 0.7 10e3/uL    Absolute Basophils 0.0 0.0 - 0.2 10e3/uL    Absolute Immature Granulocytes 0.0 <=0.4 10e3/uL    Absolute NRBCs 0.0 10e3/uL   Extra Red Top Tube   Result Value Ref Range     Hold Specimen JIC    Result Value Ref Range    INR 0.98 0.85 - 1.15   Result Value Ref Range    aPTT <20 (L) 22 - 38 Seconds   ECG 12-LEAD WITH MUSE (L[facility])   Result Value Ref Range    Systolic Blood Pressure 160 mmHg    Diastolic Blood Pressure 99 mmHg    Ventricular Rate 88 BPM    Atrial Rate 88 BPM    OH Interval 178 ms    QRS Duration 140 ms     ms    QTc 471 ms    P Axis 54 degrees    R AXIS -40 degrees    T Axis 4 degrees    Interpretation ECG       Sinus rhythm  Left axis deviation  Right bundle branch block  T wave abnormality, consider lateral ischemia  Abnormal ECG  When compared with ECG of 28-OCT-2022 10:59,  T wave inversion now evident in Lateral leads  Confirmed by SEE ED PROVIDER NOTE FOR, ECG INTERPRETATION ([id number]),  CHARITO DUTTA ([id number]) on 7/8/2023 9:42:53 AM         RADIOLOGY:  Reviewed all pertinent imaging. Please see official radiology report.  CT Chest Pulmonary Embolism w Contrast   Preliminary Result   Abnormal   IMPRESSION:   1.  Prominent bilateral pulmonary embolism burden leading to the distal portions of the right and left main pulmonary arteries. Mildly dilated main pulmonary artery root. Correlate with any evidence of cardiac strain.   2.  Mild patchy groundglass opacities in both lungs may be mild edema.   3.  Small cholelithiasis.         [Critical Result: Bilateral pulmonary embolism.]      Finding was identified on 07/08/2023 at 12:10 PM CDT.       1.  Dr. Delgadillo was contacted by me on 07/08/2023 at 12:15 PM CDT and verbalized understanding of the critical result.          Echocardiogram Complete    (Results Pending)   US Lower Extremity Venous Duplex Bilateral    (Results Pending)       EKG:    Performed at: 9:31    Impression: Sinus rhythm.Left axis deviation. Right bundle branch block.    Rate: 88 bpm  Rhythm: Sinus rhythm  Axis: -40  OH Interval: 178 ms  QRS Interval: 140 ms  QTc Interval: 471 ms  ST Changes: T wave abnormality, consider lateral  ischemia.  Comparison: When compared with ECG from 10/28/2022 10:59, T wave inversion now evident in Lateral leads.     I have independently reviewed and interpreted the EKG(s) documented above.      I, Dutch Pina, am serving as a scribe to document services personally performed by Chris Delgadillo MD based on my observation and the provider's statements to me. I, Dr. Chris Delgadillo, attest that Dutch Pina is acting in a scribe capacity, has observed my performance of the services and has documented them in accordance with my direction.    Chris Delgadillo MD  Emergency Medicine  [facility] EMERGENCY ROOM  [facility] [facility] [address] [phone]     Chris Delgadillo MD  07/08/23 9895     No

## 2024-10-24 ENCOUNTER — TRANSFERRED RECORDS (OUTPATIENT)
Dept: HEALTH INFORMATION MANAGEMENT | Facility: CLINIC | Age: 74
End: 2024-10-24
Payer: COMMERCIAL

## 2024-10-29 ENCOUNTER — OFFICE VISIT (OUTPATIENT)
Dept: UROLOGY | Facility: CLINIC | Age: 74
End: 2024-10-29
Payer: COMMERCIAL

## 2024-10-29 VITALS
DIASTOLIC BLOOD PRESSURE: 87 MMHG | OXYGEN SATURATION: 98 % | HEART RATE: 66 BPM | SYSTOLIC BLOOD PRESSURE: 132 MMHG | TEMPERATURE: 97.8 F

## 2024-10-29 DIAGNOSIS — N32.81 OVERACTIVE BLADDER: Primary | ICD-10-CM

## 2024-10-29 PROCEDURE — 99024 POSTOP FOLLOW-UP VISIT: CPT | Performed by: STUDENT IN AN ORGANIZED HEALTH CARE EDUCATION/TRAINING PROGRAM

## 2024-10-29 RX ORDER — OXYBUTYNIN CHLORIDE 10 MG/1
10 TABLET, EXTENDED RELEASE ORAL DAILY
Qty: 60 TABLET | Refills: 0 | Status: SHIPPED | OUTPATIENT
Start: 2024-10-29

## 2024-10-29 ASSESSMENT — PAIN SCALES - GENERAL: PAINLEVEL_OUTOF10: NO PAIN (0)

## 2024-10-29 NOTE — NURSING NOTE
"Initial /87 (BP Location: Right arm, Patient Position: Sitting, Cuff Size: Adult Large)   Pulse 66   Temp 97.8  F (36.6  C) (Tympanic)   SpO2 98%  Estimated body mass index is 40.58 kg/m  as calculated from the following:    Height as of 9/16/24: 1.727 m (5' 8\").    Weight as of 9/16/24: 121.1 kg (266 lb 14.4 oz). .  Charu Moncada MA on 10/29/2024 at 8:52 AM    "

## 2024-10-29 NOTE — PROGRESS NOTES
UROLOGY OUTPATIENT VISIT      Chief Complaint:   Post-op holep      Synopsis   Sharad Yi is a very pleasant AGE: 74 year old year old person  Past medical history is significant for BPH, chronic knee pain, bilateral pulmonary embolism and DVT managed with apixaban 5 mg twice a day    4/3/2024 Has incomplete emptying with PVR around 200. +Bladder stone. We planned for holep and bladder stone removal    9/16/2024 holep - 55 grams removed, Path benign.     10/11/2024 - PT Pt is urinating every 2 hours and sometimes when away from home.   Pt states he has a burning pain at the tip of the penis - 60% of the time.   Pt reports he has a sensation of incomplete emptying at times when voiding. Dribbling after rising from toilet.    Today the patient reports       Noted a significant amount of blood in his urine, especially when standing to urinate; typically, he sits down to urinate at home.      Describes urinary urgency, especially when idle, needing to void approximately every hour to hour and a half.      Initially, urine appears red and then clears to a pinkish color, especially after prolonged sitting.      While active (e.g., bowling or golfing), he can manage longer intervals between urination (2-3 hours).      He has been off prostate medications (Flomax, oxybutynin) and occasionally wears pads for security when going out, but does not change them multiple times during the day.  The patient reports completing one physical therapy session    Incontinence none, wears pads only for protection.   AUASS   3,5,1,5,1,1,1 mixed    The following distinct labs were reviewed   Prostate Specific Antigen Screen   Date Value Ref Range Status   12/05/2023 4.66 0.00 - 6.50 ng/mL Final   10/05/2021 3.18 0.00 - 6.50 ug/L Final     Most Recent 3 BMP's:  Recent Labs   Lab Test 09/17/24  0655 09/09/24  0954 03/13/24  1010 07/09/23  0407    140  --  141   POTASSIUM 4.0 4.5  --  4.4   CHLORIDE 104 106  --  106   CO2 27  26  --  24   BUN 12.5 17.8  --  13   CR 0.81 0.97 1.0 0.86   ANIONGAP 7 8  --  11   BRIANDA 8.8 9.2  --  9.1   * 109*  --  103       Medical Comorbidities      Past Medical History:   Diagnosis Date    Arthritis     BPH (benign prostatic hyperplasia)     Chronic knee pain     Hypertension                Medications     Current Outpatient Medications   Medication Sig Dispense Refill    acetaminophen (TYLENOL) 500 MG tablet Take 1,000 mg by mouth every 6 hours as needed for mild pain ([id number]).      apixaban ANTICOAGULANT (ELIQUIS) 5 MG tablet Take 1 tablet (5 mg) by mouth 2 times daily.      atorvastatin (LIPITOR) 80 MG tablet Take 1 tablet (80 mg) by mouth at bedtime Increase dose since LDL is more elevated this year. Recheck in one year 90 tablet 3    Cholecalciferol (VITAMIN D3 PO) Take 400 Units by mouth daily.      diphenhydrAMINE-acetaminophen (TYLENOL PM)  MG tablet Take 1 tablet by mouth nightly as needed for sleep.      metoprolol succinate ER (TOPROL XL) 50 MG 24 hr tablet Take 1 tablet (50 mg) by mouth at bedtime 90 tablet 3    phenazopyridine (PYRIDIUM) 200 MG tablet Take 1 tablet (200 mg) by mouth 3 times daily as needed (pain with urination). 9 tablet 0    senna-docusate (SENOKOT-S/PERICOLACE) 8.6-50 MG tablet Take 1 tablet by mouth daily. To prevent constipation 30 tablet 0     Current Facility-Administered Medications   Medication Dose Route Frequency Provider Last Rate Last Admin    betamethasone acet & sod phos (CELESTONE) injection 6 mg  6 mg   Ollie Mejía MD   6 mg at 08/04/22 0800            Assessment/Plan   Sharad Yi is a very pleasant AGE: 74 year old year old person    BPH/Post-HoLEP symptoms:      The patient is experiencing blood in urine, especially after prolonged sitting, and increased urgency and frequency of urination. The initial red coloration transitioning to pink indicates that healing is ongoing, potentially exacerbated by apixaban.      Reinforce  the understanding that the bleeding can last longer due to anticoagulation and should improve over time.      Symptoms of urgency and frequency are consistent with post-operative recovery and bladder irritation.      Management: Initiate oxybutynin to help relax the bladder temporarily while healing progresses. The patient may take it daily for two months or as needed, especially on days when he anticipates increased activity.  Education on bladder retraining: Emphasize deep breathing and distraction techniques to help manage urgency.    Follow up in 2 months     CC:  Leonardo Perez

## 2024-11-05 ENCOUNTER — PATIENT OUTREACH (OUTPATIENT)
Dept: CARE COORDINATION | Facility: CLINIC | Age: 74
End: 2024-11-05
Payer: COMMERCIAL

## 2024-11-18 DIAGNOSIS — I26.99 ACUTE PULMONARY EMBOLISM, UNSPECIFIED PULMONARY EMBOLISM TYPE, UNSPECIFIED WHETHER ACUTE COR PULMONALE PRESENT (H): ICD-10-CM

## 2024-11-18 NOTE — TELEPHONE ENCOUNTER
Medication Question or Refill    Contacts       Contact Date/Time Type Contact Phone/Fax    11/18/2024 08:10 AM CST Phone (Incoming) Damien Yi (Self) [phone] (M)            What medication are you calling about (include dose and sig)?: apixaban ANTICOAGULANT (ELIQUIS) 5 MG tablet     Preferred Pharmacy:   [facility] [id number] [place] [address]  AT [address] [place] [address]  Phone: [phone] Fax: [phone]      Controlled Substance Agreement on file:   CSA -- Patient Level:     [Media Unavailable] Controlled Substance Agreement - Opioid - Scan on 9/9/2024  3:30 PM       Who prescribed the medication?: Dasia Kennedy MD but Dr Leonardo Perez is PCP    Do you need a refill? Yes    Do you have any questions or concerns?  Yes: Pt is going out of town Wednesday and need filling beforehand.     Could we send this information to you in [employer] or would you prefer to receive a phone call?:   Patient would prefer a phone call   Okay to leave a detailed message?: Yes at Cell number on file:    Telephone Information:   Mobile [phone]

## 2024-11-19 NOTE — PATIENT INSTRUCTIONS
Painless hematuria  Damien observed with 1 instance of painless reddish-brown urine yesterday February 2, 2024, with no associated pain, in the context of being on chronic anticoagulation with Eliquis because of history of pulmonary embolism in July 2023    Damien appears well today.  He is known to have renal cysts that have been seen on ultrasound.  He does recall a history of kidney stones from many years ago, but has not had any recent symptoms suggestive of kidney stones.  I did tell him that his observation of possible blood in the urine yesterday could be from kidney stones, especially if especially considering that he takes Eliquis.    Damien did give us a urine specimen today, and the laboratory is running it.  I will send in the result through the online [facility].    If it shows signs of infection, then I would treat Damien with a course of antibiotics.    If it shows blood but not signs of infection, then I will instruct Damien to work with his usual PCP on a hematuria workup.  That could start with a CT urogram, which would show the urinary tract from the kidneys all the way to the bladder, and would reveal things like stones or tumors.  I told him that if the CT urogram does not find the problem, the next step would be to have a consultation with a urologist, and maybe undergo a cystoscopy.  The need to pursue this would be more pressing if rate continue to observe or bloody urine, or if he gets pain referable to the urinary tract, for example flank pain or lower abdominal pain    Viral URI  Cough going on about 2 weeks, but I do not see any signs of bacterial infection, I think he caught one of the common community viral bugs, and should let this resolve on its own.  Okay to use symptomatic measures like Robitussin and over-the-counter cold remedies, but avoid decongestants because those could raise blood pressure and affect bladder function.   [facility]  OFFICE VISIT    CHIEF COMPLAINT:    Chief Complaint   Patient presents with     Symptoms       SUBJECTIVE:  Warren Jamison is a 80 year old male history of hypertension, hyperlipidemia, GERD, BPH, Alzheimer's disease and CKD stage III who presents for concern for UTI.  Patient is accompanied by his spouse who provided some of the history.  Patient has had headaches and urinary frequency.  Patient has chronic headaches which are well controlled with Tylenol. Per spouse, patient has not been acting quite like himself since Saturday. Patient states patient was \"out of it\" and has been incontinent several times today.  She does note that he has days like this from time to time due to his Alzheimer's, therefore she is unsure if it is secondary to this or if he is developing a UTI.  Patient denies fevers, chills, flank pain, abdominal pain, inability to empty entire bladder, vomiting or diarrhea.  Has tried Tylenol with relief of headaches.     REVIEW OF SYSTEMS:    All other systems are reviewed and are negative except as documented in the history of present illness.    PAST HISTORIES:  I have reviewed the past medical history, family history, social history, medications and allergies listed in the medical record as obtained by my nursing staff and support staff and agree with their documentation.      OBJECTIVE:  PHYSICAL EXAM:    Visit Vitals  /75   Pulse 94   Temp 99.7 °F (37.6 °C) (Oral)   Resp 18   Wt 99.6 kg (219 lb 9.6 oz)   SpO2 99%   BMI 33.39 kg/m²        CONSTITUTIONAL: Well-hydrated, well-nourished male who appears to be in no acute distress. Awake, alert and cooperative.  HEENT: Head normocephalic and atraumatic. PERRLA. Conjunctivae clear without erythema or exudate. External ears without deformities. Hearing is grossly normal. Nose without deformities. There is moist nasal mucosa. Oropharynx is moist.  NECK: Neck supple.   LUNGS: Breathing unlabored. Lung sounds clear  to auscultation bilaterally. No wheezes, rales or rhonchi noted.   CARDIOVASCULAR: Regular rate and rhythm with normal S1 and S2. There are no murmurs auscultated.   ABDOMEN: Soft and non-tender. No masses. No hepatosplenomegaly. Bowel sounds present.   GENITOURINARY: No CVA tenderness.  MUSCULOSKELETAL: Steady gait.   SKIN: Warm, dry, intact without rash or lesion.  NEUROLOGIC: Alert and oriented x3.   PSYCHIATRIC:  Speech and behavior appropriate. Normal mood and affect.    DIAGNOSTICS:  Labs:  Results for orders placed or performed in visit on 11/18/24   Urinalysis & Reflex Microscopy With Culture If Indicated    Specimen: Urine clean catch   Result Value    COLOR, URINALYSIS Yellow    APPEARANCE, URINALYSIS Clear    GLUCOSE, URINALYSIS Negative    BILIRUBIN, URINALYSIS Negative    KETONES, URINALYSIS Negative    SPECIFIC GRAVITY, URINALYSIS 1.010    OCCULT BLOOD, URINALYSIS Negative    PH, URINALYSIS 6.0    PROTEIN, URINALYSIS Trace (A)    UROBILINOGEN, URINALYSIS 2.0 (A)    NITRITE, URINALYSIS Negative    LEUKOCYTE ESTERASE, URINALYSIS Negative   POCT Blood Sugar Hand Held Device   Result Value    Glucose Blood, POC 93         ASSESSMENT/PLAN:     Warren was seen today for gu symptoms.    Diagnoses and all orders for this visit:    Urinary frequency  -     Urinalysis & Reflex Microscopy With Culture If Indicated  -     POCT Blood Sugar Hand Held Device    Chronic nonintractable headache, unspecified headache type  -     Cancel: COVID/Flu/RSV panel; Future  -     Cancel: COVID/Flu/RSV panel  -     COVID/Flu/RSV panel        VS normal. NAD. Well appearing.  UA is clear in appearance and negative for nitrites, leukocytes or bacteria, therefore not consistent with a UTI.  These results were discussed with the patient and his wife in person.  Fingerstick glucose obtained as well for further evaluation of urinary frequency and is 93.  Patient and his wife reassured, urinary frequency may be secondary to history of  BPH.  Discussed with Dr. Meeks, also obtain COVID flu swab to evaluate if this is a cause of his headaches, patient informed that he will likely receive this result tomorrow.    Plan: Awaiting COVID/flu/RSV swab.  Advised patient spouse to closely monitor him for any other new or worsening symptoms and to go the ER should these develop.    Follow-up: with PCP as needed. Patient instructed on reasons to return to  or go to the ER for evaluation.     Instructions provided as documented in the After Visit Summary.    I discussed the possible diagnoses with the patient as well as the warning signs and symptoms. The patient understands that this is a provisional diagnosis which can and do change. The diagnosis that the patient was discharged with today is based on the symptoms with which they presented. If any new symptoms occur or if symptoms worsen, the patient understands that they must seek immediate attention for re-evaluation. Patient was also provided with discharge instructions and follow up information. Patient is to follow up with PCP in 1 day with regard to all of the above medical problems. Patient expressed their understanding and agrees with plan for discharge. All questions have been answered.       The patient indicated understanding of the diagnosis and agreed with the plan of care.         Shanna Carlos PA-C  Collaborating physician: Dr. Willett

## 2024-12-12 SDOH — HEALTH STABILITY: PHYSICAL HEALTH: ON AVERAGE, HOW MANY DAYS PER WEEK DO YOU ENGAGE IN MODERATE TO STRENUOUS EXERCISE (LIKE A BRISK WALK)?: 0 DAYS

## 2024-12-12 SDOH — HEALTH STABILITY: PHYSICAL HEALTH: ON AVERAGE, HOW MANY MINUTES DO YOU ENGAGE IN EXERCISE AT THIS LEVEL?: 0 MIN

## 2024-12-12 ASSESSMENT — SOCIAL DETERMINANTS OF HEALTH (SDOH): HOW OFTEN DO YOU GET TOGETHER WITH FRIENDS OR RELATIVES?: MORE THAN THREE TIMES A WEEK

## 2024-12-15 ASSESSMENT — SLEEP AND FATIGUE QUESTIONNAIRES
HOW LIKELY ARE YOU TO NOD OFF OR FALL ASLEEP WHILE WATCHING TV: SLIGHT CHANCE OF DOZING
HOW LIKELY ARE YOU TO NOD OFF OR FALL ASLEEP WHILE LYING DOWN TO REST IN THE AFTERNOON WHEN CIRCUMSTANCES PERMIT: SLIGHT CHANCE OF DOZING
HOW LIKELY ARE YOU TO NOD OFF OR FALL ASLEEP WHEN YOU ARE A PASSENGER IN A CAR FOR AN HOUR WITHOUT A BREAK: WOULD NEVER DOZE
HOW LIKELY ARE YOU TO NOD OFF OR FALL ASLEEP WHILE SITTING INACTIVE IN A PUBLIC PLACE: WOULD NEVER DOZE
HOW LIKELY ARE YOU TO NOD OFF OR FALL ASLEEP WHILE SITTING AND TALKING TO SOMEONE: WOULD NEVER DOZE
HOW LIKELY ARE YOU TO NOD OFF OR FALL ASLEEP WHILE SITTING AND READING: MODERATE CHANCE OF DOZING
HOW LIKELY ARE YOU TO NOD OFF OR FALL ASLEEP IN A CAR, WHILE STOPPED FOR A FEW MINUTES IN TRAFFIC: WOULD NEVER DOZE
HOW LIKELY ARE YOU TO NOD OFF OR FALL ASLEEP WHILE SITTING QUIETLY AFTER LUNCH WITHOUT ALCOHOL: SLIGHT CHANCE OF DOZING

## 2024-12-17 ENCOUNTER — OFFICE VISIT (OUTPATIENT)
Dept: FAMILY MEDICINE | Facility: CLINIC | Age: 74
End: 2024-12-17
Payer: COMMERCIAL

## 2024-12-17 VITALS
BODY MASS INDEX: 39.4 KG/M2 | HEART RATE: 66 BPM | SYSTOLIC BLOOD PRESSURE: 132 MMHG | RESPIRATION RATE: 12 BRPM | DIASTOLIC BLOOD PRESSURE: 87 MMHG | WEIGHT: 260 LBS | OXYGEN SATURATION: 97 % | TEMPERATURE: 98.7 F | HEIGHT: 68 IN

## 2024-12-17 DIAGNOSIS — N32.81 OVERACTIVE BLADDER: ICD-10-CM

## 2024-12-17 DIAGNOSIS — I26.99 ACUTE PULMONARY EMBOLISM, UNSPECIFIED PULMONARY EMBOLISM TYPE, UNSPECIFIED WHETHER ACUTE COR PULMONALE PRESENT (H): ICD-10-CM

## 2024-12-17 DIAGNOSIS — Z29.11 NEED FOR VACCINATION AGAINST RESPIRATORY SYNCYTIAL VIRUS: ICD-10-CM

## 2024-12-17 DIAGNOSIS — N40.0 ENLARGED PROSTATE: ICD-10-CM

## 2024-12-17 DIAGNOSIS — R73.09 ELEVATED GLUCOSE: ICD-10-CM

## 2024-12-17 DIAGNOSIS — I10 ESSENTIAL HYPERTENSION, BENIGN: ICD-10-CM

## 2024-12-17 DIAGNOSIS — I10 BENIGN ESSENTIAL HYPERTENSION: Primary | ICD-10-CM

## 2024-12-17 DIAGNOSIS — E78.5 HYPERLIPIDEMIA, UNSPECIFIED HYPERLIPIDEMIA TYPE: ICD-10-CM

## 2024-12-17 LAB
EST. AVERAGE GLUCOSE BLD GHB EST-MCNC: 131 MG/DL
HBA1C MFR BLD: 6.2 % (ref 0–5.6)

## 2024-12-17 PROCEDURE — 83036 HEMOGLOBIN GLYCOSYLATED A1C: CPT | Performed by: NURSE PRACTITIONER

## 2024-12-17 PROCEDURE — 36415 COLL VENOUS BLD VENIPUNCTURE: CPT | Performed by: NURSE PRACTITIONER

## 2024-12-17 RX ORDER — ATORVASTATIN CALCIUM 80 MG/1
80 TABLET, FILM COATED ORAL AT BEDTIME
Qty: 90 TABLET | Refills: 3 | Status: SHIPPED | OUTPATIENT
Start: 2024-12-17

## 2024-12-17 RX ORDER — METOPROLOL SUCCINATE 50 MG/1
50 TABLET, EXTENDED RELEASE ORAL AT BEDTIME
Qty: 90 TABLET | Refills: 3 | Status: SHIPPED | OUTPATIENT
Start: 2024-12-17

## 2024-12-17 NOTE — PROGRESS NOTES
Preventive Care Visit  [facility] CHOLO Gutierrez CNP, Family Medicine  Dec 17, 2024  {Provider  Link to SmartSet :584383}    {PROVIDER CHARTING PREFERENCE:518231}    Enzo Quezada is a 74 year old, presenting for the following:  Wellness Visit        12/17/2024     1:20 PM   Additional Questions   Roomed by Yvonne QUICK     {ROOMER if patient is in their first year of Medicare a vision screen is required click here to document the Vison screen and then refresh the note to pull in results  :849201}      HPIHealth Care Directive  Patient does not have a Health Care Directive: Discussed advance care planning with patient; information given to patient to review.      12/12/2024   General Health   How would you rate your overall physical health? (!) FAIR   Feel stress (tense, anxious, or unable to sleep) Not at all            12/12/2024   Nutrition   Diet: Regular (no restrictions)            12/12/2024   Exercise   Days per week of moderate/strenous exercise 0 days   Average minutes spent exercising at this level 0 min      (!) EXERCISE CONCERN      12/12/2024   Social Factors   Frequency of gathering with friends or relatives More than three times a week   Worry food won't last until get money to buy more No   Food not last or not have enough money for food? No   Do you have housing? (Housing is defined as stable permanent housing and does not include staying ouside in a car, in a tent, in an abandoned building, in an overnight shelter, or couch-surfing.) Yes   Are you worried about losing your housing? No   Lack of transportation? No   Unable to get utilities (heat,electricity)? No            12/12/2024   Fall Risk   Fallen 2 or more times in the past year? No     No    Trouble with walking or balance? No     No        Patient-reported    Multiple values from one day are sorted in reverse-chronological order          12/12/2024   Activities of Daily Living- Home Safety   Needs help  with the following daily activites None of the above   Safety concerns in the home None of the above            2024   Dental   Dentist two times every year? Yes            2024   Hearing Screening   Hearing concerns? (!) I NEED TO ASK PEOPLE TO SPEAK UP OR REPEAT THEMSELVES.    (!) IT'S HARD TO FOLLOW A CONVERSATION IN A NOISY RESTAURANT OR CROWDED ROOM.       Multiple values from one day are sorted in reverse-chronological order         2024   Driving Risk Screening   Patient/family members have concerns about driving No            2024   General Alertness/Fatigue Screening   Have you been more tired than usual lately? No            2024   Urinary Incontinence Screening   Bothered by leaking urine in past 6 months Yes            2024   TB Screening   Were you born outside of the [place]? No            Today's PHQ-2 Score:       2024    12:54 PM   PHQ-2 (  [employer])   Q1: Little interest or pleasure in doing things 0    Q2: Feeling down, depressed or hopeless 0    PHQ-2 Score 0    Q1: Little interest or pleasure in doing things Not at all   Q2: Feeling down, depressed or hopeless Not at all   PHQ-2 Score 0       Patient-reported           2024   Substance Use   Alcohol more than 3/day or more than 7/wk No   Do you have a current opioid prescription? No   How severe/bad is pain from 1 to 10? 4/10   Do you use any other substances recreationally? No        Social History     Tobacco Use    Smoking status: Former     Current packs/day: 0.00     Average packs/day: 0.5 packs/day for 2.2 years (1.1 ttl pk-yrs)     Types: Cigarettes     Start date: 1973     Quit date: 1975     Years since quittin.4     Passive exposure: Past    Smokeless tobacco: Never    Tobacco comments:     In college   Vaping Use    Vaping status: Never Used   Substance Use Topics    Alcohol use: Yes     Alcohol/week: 10.0 standard drinks of alcohol     Types: 10 Standard drinks or equivalent  per week     Comment: not everyday, occassional    Drug use: No     {Provider  If there are gaps in the social history shown above, please follow the link to update and then refresh the note Link to Social and Substance History :938978}  ASCVD Risk   The 10-year ASCVD risk score (Yeny MANJARREZ, et al., 2019) is: 28.3%    Values used to calculate the score:      Age: 74 years      Sex: Male      Is Non- : No      Diabetic: No      Tobacco smoker: No      Systolic Blood Pressure: 132 mmHg      Is BP treated: Yes      HDL Cholesterol: 49 mg/dL      Total Cholesterol: 166 mg/dL    {Link to Fracture Risk Assessment Tool (Optional):279043}    {Provider  REQUIRED FOR AWV Use the storyboard to review patient history, after sections have been marked as reviewed, refresh note to capture documentation:113250}  {Provider   REQUIRED AWV use this link to review and update sexual activity history  after section has been marked as reviewed, refresh note to capture documentation:824841}  Reviewed and updated as needed this visit by Provider                    {HISTORY OPTIONS (Optional):233707}  Current providers sharing in care for this patient include:  Patient Care Team:  Leonardo Perez MD as PCP - General (Internal Medicine - Pediatrics)  Michael Palomares PA-C as Assigned Cancer Care Provider  Gaviota Wadsworth PA-C as Physician Assistant (Urology)  Dasia Kennedy MD as Assigned Surgical Provider  Rae Phillip APRN CNP as Assigned PCP    The following health maintenance items are reviewed in Epic and correct as of today:  Health Maintenance   Topic Date Due    RSV VACCINE (1 - 1-dose 75+ series) Never done    MEDICARE ANNUAL WELLNESS VISIT  12/05/2024    LIPID  09/09/2025    BMP  09/17/2025    ANNUAL REVIEW OF HM ORDERS  12/17/2025    FALL RISK ASSESSMENT  12/17/2025    GLUCOSE  09/17/2027    COLORECTAL CANCER SCREENING  10/12/2028    DTAP/TDAP/TD IMMUNIZATION (4 - Td or Tdap)  "03/25/2029    ADVANCE CARE PLANNING  12/17/2029    PHQ-2 (once per calendar year)  Completed    INFLUENZA VACCINE  Completed    Pneumococcal Vaccine: 65+ Years  Completed    ZOSTER IMMUNIZATION  Completed    AORTIC ANEURYSM SCREENING (SYSTEM ASSIGNED)  Completed    COVID-19 Vaccine  Completed    HPV IMMUNIZATION  Aged Out    MENINGITIS IMMUNIZATION  Aged Out    RSV MONOCLONAL ANTIBODY  Aged Out    HEPATITIS C SCREENING  Discontinued    LUNG CANCER SCREENING  Discontinued       {ROS Picklists (Optional):421238}     Objective    Exam  /87   Pulse 66   Temp 98.7  F (37.1  C) (Temporal)   Resp 12   Ht 1.727 m (5' 8\")   Wt 117.9 kg (260 lb)   SpO2 97%   BMI 39.53 kg/m     Estimated body mass index is 39.53 kg/m  as calculated from the following:    Height as of this encounter: 1.727 m (5' 8\").    Weight as of this encounter: 117.9 kg (260 lb).    Physical Exam  {Exam Choices (Optional):977144}        12/17/2024   Mini Cog   Clock Draw Score 2 Normal   3 Item Recall 2 objects recalled   Mini Cog Total Score 4        {A Mini-Cog total score of 0-2 suggests the possibility of dementia, score of 3-5 suggests no dementia:229288}         Signed Electronically by: CHOLO Gasca CNP  {Email feedback regarding this note to [email or web address]   :114074}  "

## 2024-12-18 ENCOUNTER — OFFICE VISIT (OUTPATIENT)
Dept: SLEEP MEDICINE | Facility: CLINIC | Age: 74
End: 2024-12-18
Payer: COMMERCIAL

## 2024-12-18 VITALS
DIASTOLIC BLOOD PRESSURE: 87 MMHG | OXYGEN SATURATION: 97 % | HEIGHT: 68 IN | BODY MASS INDEX: 39.57 KG/M2 | SYSTOLIC BLOOD PRESSURE: 132 MMHG | WEIGHT: 261.1 LBS | HEART RATE: 66 BPM

## 2024-12-18 DIAGNOSIS — G47.33 OBSTRUCTIVE SLEEP APNEA: Primary | ICD-10-CM

## 2024-12-18 PROCEDURE — 99212 OFFICE O/P EST SF 10 MIN: CPT | Performed by: STUDENT IN AN ORGANIZED HEALTH CARE EDUCATION/TRAINING PROGRAM

## 2024-12-18 NOTE — ASSESSMENT & PLAN NOTE
Sleep study showed Severe obstructive sleep apnea. Sleep related hypoxemia was present.  Additional findings from the sleep study include AHI 71.6, REM AHI 81.7, supine AHI 0, and 25.8 minutes with oxygen saturation less than 89%.   Following discussion with patient a shared decision was made to begin therapy with auto-CPAP at 07-17 cmH2O.

## 2024-12-18 NOTE — PATIENT INSTRUCTIONS
MY INFORMATION ON SLEEP APNEA-  Sharad Yi    DOCTOR : Vincent Culver MD  SLEEP CENTER :      MY CONTACT NUMBER:    [facility]  [phone]  [facility]      For general sleep health questions:   [email or web address]    For tips about PAP and COVID-19:  [email or web address]-therapy.[email or web address]    For general info about COVID-19 including vaccines:  [email or web address]      Continue PAP therapy every night, for all hours that you are sleeping (including naps.)  As always, try to get at least 8 hours of sleep or more each day, keep a regular sleep schedule, and avoid sleep deprivation. Avoid alcohol.  Reasons that you might need a change to your pressure therapy would be weight gain or loss, waking having inadvertently removed your PAP overnight, having previously felt refreshed by sleep with CPAP use and now waking un-refreshed, and return of daytime sleepiness. Also, the development of new medical problems  (such as heart failure, stroke, medications such as narcotics) can sometimes affect breathing at night and change your PAP therapy needs.  Please bring PAP with you if you are hospitalized.  If anticipating surgery be sure to discuss with your surgeon that you have sleep apnea and use PAP therapy.    Maintain your equipment as recommended which includes routine cleaning and replacement of supplies.      Call DME for any questions regarding supplies or maintenance.    [facility], [facility] [phone]    Do not drive on engage in potentially dangerous activities if feeling sleepy.  Please follow up in sleep clinic again in 3 months.        Tips for your PAP use-    Mask fitting tips  Mask fitting exercise:    To improve your mask seal and your mobility at night, put mask on and secure in place.  Lie down in bed with full pressure and  roll to one side, adjust headgear while in that position to eliminate any leaks. Repeat process rolling to other side.     The mask seal does not have to be perfect:   CPAP machines are designed to make up for small leaks. However, you will not tolerate leaks blowing in your eyes so you will need to adjust.   Any leak should only be near or at the bottom of the mask.  We expect your mask to leak slightly at night.    Do not over-tighten the headgear straps, tighter IS NOT better, we expect minimal leak.    First try re-positioning the mask or headgear before tightening the headgear straps.  Mask leaks are expected due to changing sleeping positions. Try pulling the mask away from your skin allowing the cushion to re-inflate will minimize the leak.  If you struggle for a good fit, try turning the CPAP off and then readjust the mask by pulling it away from your face and then turning back on the CPAP.        Humidifier tips  Humidifiers can be adjusted to increase or decrease the amount of moisture according to your comfort level. You may need to adjust this frequently at first, but then might only change it with seasonal weather changes.     Try INCREASING the humidity if:  You experience a dry, irritated nasal passage or throat.  You have a runny, drippy nose or sneezing fits after using CPAP.  You experience nasal congestion during or after CPAP use.    Try DECREASING the humidity if:  You have excessive condensation or  rain out  in the tubing or mask.  Otherwise keep the tubing warm during the night by running it underneath the blankets or pillow.      Clinic visit after initial PAP set-up   Bring your equipment with you to your 5-8 week follow up clinic visit.  We will be extracting your data from the machine if not available from the cloud based [employer].        Travel  Always take your equipment with you when you travel.  If you fly with your equipment bring it on with you as a carry on.  Medical equipment does  not count as a carry on.    If you travel international the machines take 110-240v.  The only adapter needed is the adapter that will fit into the receptacle (outlet).    You may also want to bring an extension cord as many hotel rooms have limited outlets at the bedside.  Do not travel with water in your humidifier chamber.     Cleaning and Maintenance Guidelines    Equipment Frequency Cleaning Method   Mask First Day    Daily      Weekly Soak mask in hot soapy water for 30 minutes, rinse and air dry.  Wipe nasal cushion with a hot soapy (Ivory, baby shampoo) cloth and rinse.  Baby wipes may also be used.  Do not use anti-bacterial soaps,Jaye  liquid soap, rubbing alcohol, bleach or ammonia.  Wash frame in hot soapy water (Ivory, baby shampoo) rinse and let air dry   Headgear Biweekly Wash in hot soapy water, rinse and air dry   Reusable Gray Filter Weekly Wash in hot soapy water, rinse, put in towel squeeze moisture out, let air dry   Disposable White Filter Check Weekly Replace when brown or gray in color; at least every 2 to 3 months   Humidifier Chamber Daily    Weekly Empty distilled water from humidifier and let air dry    Hand wash in hot soapy water, rinse and air dry   Tubing Weekly Wash in hot soapy water, rinse and let air dry   Mask, Tubing and Humidifier Chamber As needed Disinfect: Soak in 1 part distilled white vinegar to 3 parts hot water for 30 minutes, rinse well and air dry  Not the material headgear        MASK AND SUPPLY REORDERING and EQUIPMENT NEEDS through your DME and per your insurance  Reminder: Most insurance companies will allow for a new mask, headgear, tubing, and reusable gray filter every six months.  Disposable white ultra-fine filters are covered monthly.      HOME AND SAFETY INSTRUCTIONS  Do not use frayed or cracked electrical cords, multi plug adaptors, or switched receptacles  Do not immerse electrical equipment into water  Assure that electrical cords do not become a tripping  hazard

## 2024-12-18 NOTE — PROGRESS NOTES
[facility]  Sleep Study Follow-Up Visit:    Date on this visit: 12/18/2024    Primary Physician: Leonardo Perez     History of present illness:  Sharad Yi is a 74 year old male patient with HTN, HLD, B/L PE on AC, BPH, and obesity who comes in today for follow-up of His sleep study done on 08/26/24 at the [facility] for possible sleep apnea.    Sleep study showed Severe obstructive sleep apnea. Sleep related hypoxemia was present.    These findings were reviewed with patient.     Assessment and Plan:  Problem List Items Addressed This Visit       Obstructive sleep apnea - Primary     Sleep study showed Severe obstructive sleep apnea. Sleep related hypoxemia was present.  Additional findings from the sleep study include AHI 71.6, REM AHI 81.7, supine AHI 0, and 25.8 minutes with oxygen saturation less than 89%.   Following discussion with patient a shared decision was made to begin therapy with auto-CPAP at 07-17 cmH2O.          Relevant Orders    Comprehensive DME (Completed)       SCALES:  EPWORTH SLEEPINESS SCALE       12/15/2024     1:12 PM    [place] Sleepiness Scale ( FLAKO Oliveira  [id number]<br>ESS - [place]/English - Final version - 21 Nov 07 - [facility] [place].)   Sitting and reading Moderate chance of dozing   Watching TV Slight chance of dozing   Sitting, inactive in a public place (e.g. a theatre or a meeting) Would never doze   As a passenger in a car for an hour without a break Would never doze   Lying down to rest in the afternoon when circumstances permit Slight chance of dozing   Sitting and talking to someone Would never doze   Sitting quietly after a lunch without alcohol Slight chance of dozing   In a car, while stopped for a few minutes in traffic Would never doze   [place] Score (MC) 5   [place] Score (Sleep) 5        Patient-reported       INSOMNIA SEVERITY INDEX (NAKITA)        12/15/2024     1:10 PM   Insomnia Severity Index (NAKITA)   Difficulty  falling asleep 1    Difficulty staying asleep 1    Problems waking up too early 0    How SATISFIED/DISSATISFIED are you with your CURRENT sleep pattern? 2    How NOTICEABLE to others do you think your sleep problem is in terms of impairing the quality of your life? 1    How WORRIED/DISTRESSED are you about your current sleep problem? 1    To what extent do you consider your sleep problem to INTERFERE with your daily functioning (e.g. daytime fatigue, mood, ability to function at work/daily chores, concentration, memory, mood, etc.) CURRENTLY? 1    NAKITA Total Score 7        Patient-reported     Guidelines for Scoring/Interpretation:  Total score categories:  0-7 = No clinically significant insomnia   8-14 = Subthreshold insomnia   15-21 = Clinical insomnia (moderate severity)  22-28 = Clinical insomnia (severe)  Used via courtesy of www.[employer].va.gov with permission from Garo Kaur PhD., [facility]      Allergies:    No Known Allergies    Medications:    Current Outpatient Medications   Medication Sig Dispense Refill    acetaminophen (TYLENOL) 500 MG tablet Take 1,000 mg by mouth every 6 hours as needed for mild pain ([id number]).      apixaban ANTICOAGULANT (ELIQUIS) 5 MG tablet Take 1 tablet (5 mg) by mouth 2 times daily. 90 tablet 3    atorvastatin (LIPITOR) 80 MG tablet Take 1 tablet (80 mg) by mouth at bedtime. Increase dose since LDL is more elevated this year. Recheck in one year 90 tablet 3    Cholecalciferol (VITAMIN D3 PO) Take 400 Units by mouth daily. (Patient not taking: Reported on 10/29/2024)      diphenhydrAMINE-acetaminophen (TYLENOL PM)  MG tablet Take 1 tablet by mouth nightly as needed for sleep.      metoprolol succinate ER (TOPROL XL) 50 MG 24 hr tablet Take 1 tablet (50 mg) by mouth at bedtime. 90 tablet 3    senna-docusate (SENOKOT-S/PERICOLACE) 8.6-50 MG tablet Take 1 tablet by mouth daily. To prevent constipation 30 tablet 0       Problem List:  Patient Active Problem  List    Diagnosis Date Noted    BPH (benign prostatic hyperplasia) 09/16/2024     Priority: Medium    Obstructive sleep apnea 04/08/2024     Priority: Medium    History of pulmonary embolism 12/07/2023     Priority: Medium    Family history of prostate cancer in father 12/07/2023     Priority: Medium    Enlarged prostate 12/07/2023     Priority: Medium    History of colonic polyps 10/20/2023     Priority: Medium    Gallstones 07/08/2023     Priority: Medium    Acute pulmonary embolism (H) 07/08/2023     Priority: Medium     Diagnosed July 2023      Morbid obesity (H)      Priority: Medium     Formatting of this note might be different from the original.  Created by Conversion      Nonalcoholic fatty liver 09/18/2019     Priority: Medium    Hemorrhoids 04/25/2019     Priority: Medium    Hyperlipidemia      Priority: Medium     Created by Conversion      Formatting of this note might be different from the original.  Created by Conversion      Onychomycosis      Priority: Medium     Created by Conversion      Formatting of this note might be different from the original.  Created by Conversion      Benign essential hypertension      Priority: Medium     Created by Conversion      Formatting of this note might be different from the original.  Created by Conversion      Sebaceous cyst      Priority: Medium     Created by Conversion  [employer] [facility] Annotation: Apr 28 2014  9:18AM - Jase Rogers: Inflamed   slightly.      Formatting of this note might be different from the original.  Created by Conversion  [employer] [facility] Annotation: Apr 28 2014  9:18AM - Jase Rogers: Inflamed   slightly.      Elevated PSA 03/02/2015     Priority: Medium    Kidney cysts 02/23/2015     Priority: Medium        Past Medical/Surgical History:  Past Medical History:   Diagnosis Date    Arthritis     BPH (benign prostatic hyperplasia)     Chronic knee pain     Hypertension      Past Surgical History:   Procedure Laterality Date    COLONOSCOPY   2016    LASER HOLMIUM ENUCLEATION PROSTATE N/A 2024    Procedure: ENUCLEATION, PROSTATE, USING HOLMIUM LASER,;  Surgeon: Dasia Kennedy MD;  Location: [facility] OR    MENISCECTOMY Right     ORTHOPEDIC SURGERY  Oct 2019, 2022    [facility] APPENDECTOMY      Description: Appendectomy;  Recorded: 2013;       Social History:  Social History     Socioeconomic History    Marital status:      Spouse name: Not on file    Number of children: 1    Years of education: Not on file    Highest education level: Not on file   Occupational History    Occupation: DNR     Comment: Retired   Tobacco Use    Smoking status: Former     Current packs/day: 0.00     Average packs/day: 0.5 packs/day for 2.2 years (1.1 ttl pk-yrs)     Types: Cigarettes     Start date: 1973     Quit date: 1975     Years since quittin.4     Passive exposure: Past    Smokeless tobacco: Never    Tobacco comments:     In college   Vaping Use    Vaping status: Never Used   Substance and Sexual Activity    Alcohol use: Yes     Alcohol/week: 10.0 standard drinks of alcohol     Types: 10 Standard drinks or equivalent per week     Comment: not everyday, occassional    Drug use: No    Sexual activity: Not Currently     Partners: Female     Birth control/protection: None   Other Topics Concern    Parent/sibling w/ CABG, MI or angioplasty before 65F 55M? Yes     Comment: Father   Social History Narrative    Not on file     Social Drivers of Health     Financial Resource Strain: Low Risk  (2024)    Financial Resource Strain     Within the past 12 months, have you or your family members you live with been unable to get utilities (heat, electricity) when it was really needed?: No   Food Insecurity: Low Risk  (2024)    Food Insecurity     Within the past 12 months, did you worry that your food would run out before you got money to buy more?: No     Within the past 12 months, did the food you bought just not last and you didn t have  money to get more?: No   Transportation Needs: Low Risk  (12/12/2024)    Transportation Needs     Within the past 12 months, has lack of transportation kept you from medical appointments, getting your medicines, non-medical meetings or appointments, work, or from getting things that you need?: No   Physical Activity: Inactive (12/12/2024)    Exercise Vital Sign     Days of Exercise per Week: 0 days     Minutes of Exercise per Session: 0 min   Stress: No Stress Concern Present (12/12/2024)    [employer] [place] [employer] - Occupational Stress Questionnaire     Feeling of Stress : Not at all   Social Connections: Unknown (12/12/2024)    Social Connection and Isolation Panel [NHANES]     Frequency of Communication with Friends and Family: Not on file     Frequency of Social Gatherings with Friends and Family: More than three times a week     Attends Orthodoxy Services: Not on file     Active Member of Clubs or Organizations: Not on file     Attends Club or Organization Meetings: Not on file     Marital Status: Not on file   Interpersonal Safety: Low Risk  (12/17/2024)    Interpersonal Safety     Do you feel physically and emotionally safe where you currently live?: Yes     Within the past 12 months, have you been hit, slapped, kicked or otherwise physically hurt by someone?: No     Within the past 12 months, have you been humiliated or emotionally abused in other ways by your partner or ex-partner?: No   Housing Stability: Low Risk  (12/12/2024)    Housing Stability     Do you have housing? : Yes     Are you worried about losing your housing?: No   Recent Concern: Housing Stability - High Risk (9/16/2024)    Housing Stability     Do you have housing? : No     Are you worried about losing your housing?: No       Family History:  Family History   Problem Relation Age of Onset    No Known Problems Mother         pacemaker    Prostate Cancer Father 57    Heart Disease Father 57    Diabetes Father     Deep Vein  "Thrombosis (DVT) Sister     No Known Problems Sister     No Known Problems Sister     Hyperlipidemia Brother     No Known Problems Brother     No Known Problems Brother     No Known Problems Brother        Review of systems  A complete review of systems reviewed by me is negative with the exeption of what has been mentioned in the history of present illness.    Physical Examination:  Vitals: /87   Pulse 66   Ht 1.727 m (5' 7.99\")   Wt 118.4 kg (261 lb 1.6 oz)   SpO2 97%   BMI 39.71 kg/m    BMI= Body mass index is 39.71 kg/m .     GENERAL: alert, no distress, and obese  EYES: Eyes grossly normal to inspection.  No discharge or erythema, or obvious scleral/conjunctival abnormalities.  RESP: No audible wheeze, cough, or visible cyanosis.    SKIN: Visible skin clear. No significant rash, abnormal pigmentation or lesions.  NEURO: Cranial nerves grossly intact.  Mentation and speech appropriate for age.  PSYCH: Appropriate affect, tone, and pace of words          Other tests/labs:   I have reviewed the labs and personally reviewed the imaging below and made my comment in the assessment and plan.      Patient was strongly advised to avoid driving, operating any heavy machinery or other hazardous situations while drowsy or sleepy.  Patient was counseled on the importance of driving while alert, to pull over if drowsy, or nap before getting into the vehicle if sleepy.      Plan is for Sharad iY to follow up in about 3 month(s).     The above note was dictated using voice recognition software. Although reviewed after completion, some word and grammatical error may remain . Please contact the author for any clarifications.    Total time spent reviewing medical records, history and physical examination, review of previous testing and interpretation as well as documentation on this date, 12/18/24: 16 minutes    Vincent Culver MD on 12/18/24  [facility] " Professional Building   Floor 1, [address]   [address] [place] [address]. [place] [address]   Appointments: [phone]     CC: Vincent Culver

## 2025-01-08 ENCOUNTER — OFFICE VISIT (OUTPATIENT)
Dept: UROLOGY | Facility: CLINIC | Age: 75
End: 2025-01-08
Payer: COMMERCIAL

## 2025-01-08 VITALS — RESPIRATION RATE: 16 BRPM | SYSTOLIC BLOOD PRESSURE: 150 MMHG | HEART RATE: 60 BPM | DIASTOLIC BLOOD PRESSURE: 91 MMHG

## 2025-01-08 DIAGNOSIS — R39.14 BENIGN PROSTATIC HYPERPLASIA WITH INCOMPLETE BLADDER EMPTYING: Primary | ICD-10-CM

## 2025-01-08 DIAGNOSIS — N40.1 BENIGN PROSTATIC HYPERPLASIA WITH INCOMPLETE BLADDER EMPTYING: Primary | ICD-10-CM

## 2025-01-08 DIAGNOSIS — Z12.5 ENCOUNTER FOR SCREENING FOR MALIGNANT NEOPLASM OF PROSTATE: ICD-10-CM

## 2025-01-08 PROCEDURE — 99214 OFFICE O/P EST MOD 30 MIN: CPT | Performed by: STUDENT IN AN ORGANIZED HEALTH CARE EDUCATION/TRAINING PROGRAM

## 2025-01-08 NOTE — PROGRESS NOTES
UROLOGY OUTPATIENT VISIT      Chief Complaint:   Post-op holep      Synopsis   Sharad Yi is a very pleasant AGE: 74 year old year old person  Past medical history is significant for BPH, chronic knee pain, bilateral pulmonary embolism and DVT managed with apixaban 5 mg twice a day    4/3/2024 Has incomplete emptying with PVR around 200. +Bladder stone. We planned for holep and bladder stone removal     9/16/2024 holep - 55 grams removed, Path benign.      10/11/2024 - PT Pt is urinating every 2 hours and sometimes when away from home.   Pt states he has a burning pain at the tip of the penis - 60% of the time.   Pt reports he has a sensation of incomplete emptying at times when voiding. Dribbling after rising from toilet.     10/29/2024      Describes urinary urgency, especially when idle, needing to void approximately every hour to hour and a half.      He has been off prostate medications (Flomax, oxybutynin) and occasionally wears pads for security when going out, but does not change them multiple times during the day.    Today he reports strangely that the urine stream seems great when he is sitting down but seems weaker when he is standing up.  He does report that it has been improving.  He has not had any more leakage.  He does not use any pads.  His overactive bladder is also resolved he is not using any oxybutynin.  Most Recent 3 PSA:  Recent Labs   Lab Test 12/05/23  1015 12/08/22  1105 10/05/21  0938   PSA 4.66 5.32 3.18         Medical Comorbidities      Past Medical History:   Diagnosis Date    Arthritis     BPH (benign prostatic hyperplasia)     Chronic knee pain     Hypertension                Medications     Current Outpatient Medications   Medication Sig Dispense Refill    acetaminophen (TYLENOL) 500 MG tablet Take 1,000 mg by mouth every 6 hours as needed for mild pain ([id number]).      apixaban ANTICOAGULANT (ELIQUIS) 5 MG tablet Take 1 tablet (5 mg) by mouth 2 times daily. 90  tablet 3    atorvastatin (LIPITOR) 80 MG tablet Take 1 tablet (80 mg) by mouth at bedtime. Increase dose since LDL is more elevated this year. Recheck in one year 90 tablet 3    Cholecalciferol (VITAMIN D3 PO) Take 400 Units by mouth daily. (Patient not taking: Reported on 10/29/2024)      diphenhydrAMINE-acetaminophen (TYLENOL PM)  MG tablet Take 1 tablet by mouth nightly as needed for sleep.      metoprolol succinate ER (TOPROL XL) 50 MG 24 hr tablet Take 1 tablet (50 mg) by mouth at bedtime. 90 tablet 3    senna-docusate (SENOKOT-S/PERICOLACE) 8.6-50 MG tablet Take 1 tablet by mouth daily. To prevent constipation 30 tablet 0     No current facility-administered medications for this visit.            Assessment/Plan   Sharad Yi is a very pleasant AGE: 74 year old year old person enlarged prostate underwent holmium laser nucleation of prostate in September 2024. history of DVT on Eliquis who had a    Benign prostatic hyperplasia   Has been doing well  His incontinence has completely resolved  Overactive bladder is resolved  Only strange thing is the stream seems to be stronger sitting down and then standing up  He feels that this is also improving  I did discuss with him rarely that people can develop strictures in the urinary tract that can cause weak stream but usually it is weak both sitting and standing up we will plan to see him back in about 6 months and I will check a flow rate and PVR  If this shows a weak stream then I would recommend we do a cystoscopy to evaluate for stricture  Will have him see Gaviota Wadsworth    Elevated PSA  Will recheck PSA post-op holep, I suspect from BPH    Overactive bladder   - resolved        CC:  Leonardo Perez    Additional Coding Information:  Problems:  Two or more stable chronic illness    Data Reviewed  Tests ordered: PSA and Tests Reviewed: PSA

## 2025-01-08 NOTE — PROGRESS NOTES
[facility]  [address] [place] [id number]  Main: [phone], Fax: [phone]    Patient seen at: [facility] at [address] [place] [address]    Outpatient Visit Note:    Patient: Sharad Yi  MRN: [id number]  : 1950  CHAKA: 2025  Location of this writer at the time of this clinic visit was conducted: [facility].  Location of the patient at the time of this clinic visit was conducted: [facility].     Reason for visit:  Bilateral pulmonary embolism and DVT of right peroneal vein as well as superficial thrombophlebitis in the right lesser saphenous vein back on 2023. Chronic anticoagulation therapy. Follow up.     Clinical History Summary.   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 He was last seen by this writer back on 2024.     Thrombosis History Summary:  2022, he underwent right total hip arthroplasty.   2023, he was walking to his car and developed shortness of breath, dizziness and diaphoresis. EMT was called and he was brought to the emergency department. At the time, he reported dyspnea for the past year and it was progressively worse for a few weeks prior to his 2023 presentation. D-Dimer was elevated at 7.34. CTA chest at the time showed: Prominent bilateral pulmonary embolism burden leading to the distal portions of the right and left main pulmonary arteries. Mildly dilated main pulmonary artery root. Correlate with any evidence of cardiac  strain. Mild patchy groundglass opacitites in both lungs may be mild edema and small cholelithiasis. Bilateral lower extremity venous ultrasound showed some moderate superficial thrombophlebitis in the lesser saphenous vein and mild acute DVT in the right peroneal vein of the calf. He eventually was discharged on 7/10/2023 on apixaban.  12/5/2023, he saw his primary care provider and was referred to our clinic for consultation.       Interim History:  1/9/2024, he established care with this writer for which it was my recommendation that he should remain on indefinite anticoagulation therapy. We elected not to proceed with inherit thrombophilia workup despite he did report a family history of DVT as such workup would not change our recommendation.   2/2/2024, he presented to his primary care provider with compliant of cough and hematuria. UA positive for blood.   2/22/2024, saw urology.   3/13/2024, CT urogram showed minimal wall thickening of the bladder, 15 mm bladder calculus and simple appearing renal cysts. Urine cytology was negative for malignancy.   4/3/2024, cystoscopy was negative for evidence of malignancy. But found bladder stone where it was recommended that he undergo cystolitholapaxy in the OR.   9/16/2024, because of benign prostatic hypertrophy with urinary symptoms, he underwent Holmium laser enucleation of the prostate. Was told to hold his apixaban for 48 hours prior to the procedure. Because of some post operative bleeding, he was admitted overnight and restarted on apixaban on 9/18/2024.    Damien reports that he continued to have some pinkish colored urine for about 2 months after his surgery. Then for the past 6 weeks, he reports that he has noticed that no further discoloration of his urine and denies any hematuria. However, he continues to experience difficulty voiding when he is in a standing position but has no issues when he is sitting. He did see his urologist 2 weeks ago and they are  "planning to have him undergo another in-office examination in 3 months.     Currently he is on apixaban at 5 mg PO BID dosing. He denies any bleeding complications other than what I have described above. He is not anticipating any invasive or surgical procedures in the next upcoming year.     ROS:  Denies any bleeding complications. Specifically, no frequent epistaxis. No issues with oral mucosal bleeding. Denies any hematuria or blood in stools. Denies any shortness of breath. No chest pain. No cough. No fever.      Medications, Allergies and PmHx:  All are reviewed by this writer today via electronic medical records    Social History:   Deferred.    Family History:  Deferred.    Objective:  Vitals: BP (!) 165/112 (BP Location: Right arm, Patient Position: Sitting)   Pulse 72   Temp 98.1  F (36.7  C) (Oral)   Ht 1.727 m (5' 8\")   Wt 119.3 kg (262 lb 14.4 oz)   SpO2 97%   BMI 39.97 kg/m    Exam:   Complete exam is not performed today.       Labs:  Component      Latest Ref Rng 9/17/2024  6:55 AM   Sodium      135 - 145 mmol/L 138    Potassium      3.4 - 5.3 mmol/L 4.0    Chloride      98 - 107 mmol/L 104    Carbon Dioxide (CO2)      22 - 29 mmol/L 27    Anion Gap      7 - 15 mmol/L 7    Urea Nitrogen      8.0 - 23.0 mg/dL 12.5    Creatinine      0.67 - 1.17 mg/dL 0.81    GFR Estimate      >60 mL/min/1.73m2 >90    Calcium      8.8 - 10.4 mg/dL 8.8    Glucose      70 - 99 mg/dL 101 (H)    WBC      4.0 - 11.0 10e3/uL 8.6    RBC Count      4.40 - 5.90 10e6/uL 4.13 (L)    Hemoglobin      13.3 - 17.7 g/dL 12.9 (L)    Hematocrit      40.0 - 53.0 % 39.4 (L)    MCV      78 - 100 fL 95    MCH      26.5 - 33.0 pg 31.2    MCHC      31.5 - 36.5 g/dL 32.7    RDW      10.0 - 15.0 % 13.6    Platelet Count      150 - 450 10e3/uL 193       Assessment:  In summary, Damien is a 74 year old male with a history of BPH, chronic knee pain S/P MCL tear and 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      Diagnosis:  Unprovoked bilateral pulmonary embolism and right leg DVT back on 7/8/2023.  Chronic anticoagulation therapy with apixaban at 5 mg PO BID dosing.     Plan:  No change in regard to his anticoagulation therapy. He remains to be a good candidate to stay on indefinite anticoagulation therapy with apixaban at 5 mg PO BID dosing. It looks like his primary care provider has just recently refilled his apixaban prescription.     In general, I recommend at least once yearly monitoring of CBC, renal and hepatic function while on a direct oral anticoagulant agent. I do noticed that he did have a low hemoglobin a day after his surgery back in Sept 2024. He did also has a normal creatinine at the time. I will recheck his hemoglobin today along with obtaining a hepatic function panel.     He is instructed to call our clinic if he should experience any unusual bleeding issues or if he should need any invasive procedures or surgical procedures in the future. Otherwise, I will plan to see him back in one year for follow up. Virtual or in-person visit is fine.        Michael Palomares PA-C, MPAS  Physician Assistant  [facility].     The longitudinal plan of care for these conditions below were addressed during this visit. Due to the added complexity in care, I will continue to support Sharad Yi  in the subsequent management of these conditions and with the ongoing continuity of care of these conditions.    Problem List Items Addressed This Visit as of 2/19/2024   Unprovoked bilateral pulmonary embolism and right leg DVT back on  7/8/2023.    20 minutes spent by me on the date of the encounter doing chart review, history and exam, documentation and further activities per the note    Time IN: 10:00  Time OUT: 10:15

## 2025-01-08 NOTE — NURSING NOTE
"Initial BP (!) 150/91 (BP Location: Right arm, Patient Position: Chair, Cuff Size: Adult Regular)   Pulse 60   Resp 16  Estimated body mass index is 39.71 kg/m  as calculated from the following:    Height as of 12/18/24: 1.727 m (5' 7.99\").    Weight as of 12/18/24: 118.4 kg (261 lb 1.6 oz). .  Patient is here for a 3 month post op for holep.  cory bennett LPN    "

## 2025-01-14 ENCOUNTER — OFFICE VISIT (OUTPATIENT)
Dept: HEMATOLOGY | Facility: CLINIC | Age: 75
End: 2025-01-14
Attending: PHYSICIAN ASSISTANT
Payer: COMMERCIAL

## 2025-01-14 VITALS
SYSTOLIC BLOOD PRESSURE: 165 MMHG | DIASTOLIC BLOOD PRESSURE: 112 MMHG | BODY MASS INDEX: 39.84 KG/M2 | OXYGEN SATURATION: 97 % | HEIGHT: 68 IN | HEART RATE: 72 BPM | WEIGHT: 262.9 LBS | TEMPERATURE: 98.1 F

## 2025-01-14 DIAGNOSIS — Z86.711 HISTORY OF PULMONARY EMBOLISM: Primary | ICD-10-CM

## 2025-01-14 DIAGNOSIS — Z79.01 CHRONIC ANTICOAGULATION: ICD-10-CM

## 2025-01-14 LAB
ALBUMIN SERPL BCG-MCNC: 4.1 G/DL (ref 3.5–5.2)
ALP SERPL-CCNC: 63 U/L (ref 40–150)
ALT SERPL W P-5'-P-CCNC: 19 U/L (ref 0–70)
AST SERPL W P-5'-P-CCNC: 21 U/L (ref 0–45)
BILIRUB DIRECT SERPL-MCNC: <0.2 MG/DL (ref 0–0.3)
BILIRUB SERPL-MCNC: 0.5 MG/DL
HGB BLD-MCNC: 14 G/DL (ref 13.3–17.7)
PROT SERPL-MCNC: 7 G/DL (ref 6.4–8.3)

## 2025-01-14 PROCEDURE — 80076 HEPATIC FUNCTION PANEL: CPT | Performed by: PHYSICIAN ASSISTANT

## 2025-01-14 PROCEDURE — 99213 OFFICE O/P EST LOW 20 MIN: CPT | Performed by: PHYSICIAN ASSISTANT

## 2025-01-14 PROCEDURE — G0463 HOSPITAL OUTPT CLINIC VISIT: HCPCS | Performed by: PHYSICIAN ASSISTANT

## 2025-01-14 PROCEDURE — 36415 COLL VENOUS BLD VENIPUNCTURE: CPT | Performed by: PHYSICIAN ASSISTANT

## 2025-01-14 PROCEDURE — 85018 HEMOGLOBIN: CPT | Performed by: PHYSICIAN ASSISTANT

## 2025-01-14 PROCEDURE — G2211 COMPLEX E/M VISIT ADD ON: HCPCS | Performed by: PHYSICIAN ASSISTANT

## 2025-01-15 ENCOUNTER — DOCUMENTATION ONLY (OUTPATIENT)
Dept: SLEEP MEDICINE | Facility: CLINIC | Age: 75
End: 2025-01-15
Payer: COMMERCIAL

## 2025-01-15 DIAGNOSIS — G47.33 OBSTRUCTIVE SLEEP APNEA: Primary | ICD-10-CM

## 2025-01-15 NOTE — PROGRESS NOTES
Patient was offered choice of vendor and chose [facility].  Patient Sharad Yi was set up at [place] on January 15, 2025. Patient received a Resmed Airsense 10 Pressures were set at  7-17 cm H2O.   Patient s ramp is 5 cm H2O for Auto and FLEX/EPR is EPR, 2.  Patient received a Resmed Mask name: Airfit [id number]  Nasal mask size Medium, Standard, heated tubing and heated humidifier.  Patient has the following compliance requirements: using and visit requirements  Patient has a follow up on 3.26.25 with Dr Culver.    Tracy L Fahrenkamp

## 2025-01-20 ENCOUNTER — DOCUMENTATION ONLY (OUTPATIENT)
Dept: SLEEP MEDICINE | Facility: CLINIC | Age: 75
End: 2025-01-20
Payer: COMMERCIAL

## 2025-01-20 NOTE — PROGRESS NOTES
3 day Sleep therapy management telephone visit    Diagnostic AHI:   PS.6         SUBJECTIVE: Patient reports doing ok with CPAP. His mouth has been opening at night causing him dry mouth. He increased humidity. Discussed he may adjust to nasal breathing only, otherwise he can consider a FFM if that continues.  Patient was given my contact information and instructed to reach out with any questions or concerns.       Order settings:  CPAP MIN CPAP MAX   7 cm H2O 17 cm H2O         Device settings:  CPAP MIN CPAP MAX EPR RESMED SOFT RESPONSE SETTING   7.0 cm  H20 17.0 cm  H20 TWO OFF         Patient has the following upcoming sleep appts:  Future Sleep Appointments         Provider Department    3/26/2025 10:00 AM (Arrive by 9:45 AM) Vincent Culver MD [facility] [place]            Replacement device: No  STM ordered by provider: Yes     Total time spent on accessing and  interpreting remote patient PAP therapy data  10 minutes    Total time spent counseling, coaching  and reviewing PAP therapy data with patient  2 minutes

## 2025-01-30 ENCOUNTER — DOCUMENTATION ONLY (OUTPATIENT)
Dept: SLEEP MEDICINE | Facility: CLINIC | Age: 75
End: 2025-01-30
Payer: COMMERCIAL

## 2025-01-30 NOTE — PROGRESS NOTES
14 day Sleep therapy management telephone visit    Diagnostic AHI:   PS.6         SUBJECTIVE: Patient reports doing pretty good with CPAP. He cut down on his tylenol PM and is getting 8 hrs sleep. He gets 100 on myAir score.   Patient was given my contact information and instructed to reach out with any questions or concerns.       Objective measures: 14 day rolling measures   COMPLIANCE LEAK AHI AVERAGE USE IN MINUTES   100 % 16.54 0.96 430   GOAL >70% GOAL < 24 LPM GOAL <5 GOAL >240          Device settings:  CPAP MIN CPAP MAX EPR RESMED SOFT RESPONSE SETTING   7.0 cm  H20 17.0 cm  H20 TWO OFF         Patient has the following upcoming sleep appts:  Future Sleep Appointments         Provider Department    3/26/2025 10:00 AM (Arrive by 9:45 AM) Vincent Culver MD [facility] [place]            Replacement device: No  STM ordered by provider: Yes     Total time spent on accessing and  interpreting remote patient PAP therapy data  10 minutes    Total time spent counseling, coaching  and reviewing PAP therapy data with patient  3 minutes

## 2025-02-01 ENCOUNTER — TRANSFERRED RECORDS (OUTPATIENT)
Dept: MULTI SPECIALTY CLINIC | Facility: CLINIC | Age: 75
End: 2025-02-01

## 2025-02-01 LAB — RETINOPATHY: NORMAL

## 2025-02-11 ENCOUNTER — HOSPITAL ENCOUNTER (EMERGENCY)
Facility: CLINIC | Age: 75
Discharge: HOME | End: 2025-02-11
Attending: EMERGENCY MEDICINE | Admitting: EMERGENCY MEDICINE
Payer: COMMERCIAL

## 2025-02-11 ENCOUNTER — OFFICE VISIT (OUTPATIENT)
Dept: URGENT CARE | Facility: URGENT CARE | Age: 75
End: 2025-02-11
Payer: COMMERCIAL

## 2025-02-11 ENCOUNTER — APPOINTMENT (OUTPATIENT)
Dept: CT IMAGING | Facility: CLINIC | Age: 75
End: 2025-02-11
Attending: EMERGENCY MEDICINE
Payer: COMMERCIAL

## 2025-02-11 VITALS
TEMPERATURE: 97.5 F | OXYGEN SATURATION: 99 % | HEART RATE: 94 BPM | SYSTOLIC BLOOD PRESSURE: 139 MMHG | BODY MASS INDEX: 39.84 KG/M2 | DIASTOLIC BLOOD PRESSURE: 87 MMHG | RESPIRATION RATE: 16 BRPM | WEIGHT: 262 LBS

## 2025-02-11 VITALS
TEMPERATURE: 97.5 F | BODY MASS INDEX: 39.4 KG/M2 | OXYGEN SATURATION: 100 % | SYSTOLIC BLOOD PRESSURE: 146 MMHG | WEIGHT: 260 LBS | DIASTOLIC BLOOD PRESSURE: 81 MMHG | HEART RATE: 84 BPM | RESPIRATION RATE: 22 BRPM | HEIGHT: 68 IN

## 2025-02-11 DIAGNOSIS — R06.09 DYSPNEA ON EXERTION: Primary | ICD-10-CM

## 2025-02-11 DIAGNOSIS — R06.09 DOE (DYSPNEA ON EXERTION): ICD-10-CM

## 2025-02-11 LAB
ANION GAP SERPL CALCULATED.3IONS-SCNC: 12 MMOL/L (ref 7–15)
ATRIAL RATE - MUSE: 102 BPM
BASOPHILS # BLD AUTO: 0 10E3/UL (ref 0–0.2)
BASOPHILS NFR BLD AUTO: 0 %
BUN SERPL-MCNC: 30.3 MG/DL (ref 8–23)
CALCIUM SERPL-MCNC: 9.2 MG/DL (ref 8.8–10.4)
CHLORIDE SERPL-SCNC: 106 MMOL/L (ref 98–107)
CREAT SERPL-MCNC: 0.96 MG/DL (ref 0.67–1.17)
D DIMER PPP FEU-MCNC: <=0.27 UG/ML FEU (ref 0–0.5)
DIASTOLIC BLOOD PRESSURE - MUSE: NORMAL MMHG
EGFRCR SERPLBLD CKD-EPI 2021: 82 ML/MIN/1.73M2
EOSINOPHIL # BLD AUTO: 0.1 10E3/UL (ref 0–0.7)
EOSINOPHIL NFR BLD AUTO: 1 %
ERYTHROCYTE [DISTWIDTH] IN BLOOD BY AUTOMATED COUNT: 13.7 % (ref 10–15)
FLUAV RNA SPEC QL NAA+PROBE: NEGATIVE
FLUBV RNA RESP QL NAA+PROBE: NEGATIVE
GLUCOSE SERPL-MCNC: 145 MG/DL (ref 70–99)
HCO3 SERPL-SCNC: 22 MMOL/L (ref 22–29)
HCT VFR BLD AUTO: 32.7 % (ref 40–53)
HGB BLD-MCNC: 10.9 G/DL (ref 13.3–17.7)
IMM GRANULOCYTES # BLD: 0 10E3/UL
IMM GRANULOCYTES NFR BLD: 1 %
INTERPRETATION ECG - MUSE: NORMAL
LYMPHOCYTES # BLD AUTO: 2.1 10E3/UL (ref 0.8–5.3)
LYMPHOCYTES NFR BLD AUTO: 26 %
MCH RBC QN AUTO: 31.2 PG (ref 26.5–33)
MCHC RBC AUTO-ENTMCNC: 33.3 G/DL (ref 31.5–36.5)
MCV RBC AUTO: 94 FL (ref 78–100)
MONOCYTES # BLD AUTO: 0.6 10E3/UL (ref 0–1.3)
MONOCYTES NFR BLD AUTO: 8 %
NEUTROPHILS # BLD AUTO: 5.2 10E3/UL (ref 1.6–8.3)
NEUTROPHILS NFR BLD AUTO: 65 %
NRBC # BLD AUTO: 0 10E3/UL
NRBC BLD AUTO-RTO: 0 /100
NT-PROBNP SERPL-MCNC: 42 PG/ML (ref 0–1800)
P AXIS - MUSE: 54 DEGREES
PLATELET # BLD AUTO: 219 10E3/UL (ref 150–450)
POTASSIUM SERPL-SCNC: 4.2 MMOL/L (ref 3.4–5.3)
PR INTERVAL - MUSE: 162 MS
QRS DURATION - MUSE: 132 MS
QT - MUSE: 382 MS
QTC - MUSE: 497 MS
R AXIS - MUSE: -58 DEGREES
RBC # BLD AUTO: 3.49 10E6/UL (ref 4.4–5.9)
RSV RNA SPEC NAA+PROBE: NEGATIVE
SARS-COV-2 RNA RESP QL NAA+PROBE: NEGATIVE
SODIUM SERPL-SCNC: 140 MMOL/L (ref 135–145)
SYSTOLIC BLOOD PRESSURE - MUSE: NORMAL MMHG
T AXIS - MUSE: 31 DEGREES
TROPONIN T SERPL HS-MCNC: 21 NG/L
TROPONIN T SERPL HS-MCNC: 23 NG/L
VENTRICULAR RATE- MUSE: 102 BPM
WBC # BLD AUTO: 8 10E3/UL (ref 4–11)

## 2025-02-11 PROCEDURE — 87637 SARSCOV2&INF A&B&RSV AMP PRB: CPT | Performed by: EMERGENCY MEDICINE

## 2025-02-11 PROCEDURE — 71275 CT ANGIOGRAPHY CHEST: CPT

## 2025-02-11 PROCEDURE — 99285 EMERGENCY DEPT VISIT HI MDM: CPT | Mod: 25

## 2025-02-11 PROCEDURE — 36415 COLL VENOUS BLD VENIPUNCTURE: CPT | Performed by: EMERGENCY MEDICINE

## 2025-02-11 PROCEDURE — 85025 COMPLETE CBC W/AUTO DIFF WBC: CPT | Performed by: EMERGENCY MEDICINE

## 2025-02-11 PROCEDURE — 93005 ELECTROCARDIOGRAM TRACING: CPT | Performed by: EMERGENCY MEDICINE

## 2025-02-11 PROCEDURE — 83880 ASSAY OF NATRIURETIC PEPTIDE: CPT | Performed by: EMERGENCY MEDICINE

## 2025-02-11 PROCEDURE — 84484 ASSAY OF TROPONIN QUANT: CPT | Performed by: EMERGENCY MEDICINE

## 2025-02-11 PROCEDURE — 250N000011 HC RX IP 250 OP 636: Performed by: EMERGENCY MEDICINE

## 2025-02-11 PROCEDURE — 85018 HEMOGLOBIN: CPT | Performed by: EMERGENCY MEDICINE

## 2025-02-11 PROCEDURE — 85379 FIBRIN DEGRADATION QUANT: CPT | Performed by: EMERGENCY MEDICINE

## 2025-02-11 PROCEDURE — 82310 ASSAY OF CALCIUM: CPT | Performed by: EMERGENCY MEDICINE

## 2025-02-11 PROCEDURE — 80048 BASIC METABOLIC PNL TOTAL CA: CPT | Performed by: EMERGENCY MEDICINE

## 2025-02-11 PROCEDURE — 82374 ASSAY BLOOD CARBON DIOXIDE: CPT | Performed by: EMERGENCY MEDICINE

## 2025-02-11 PROCEDURE — 99214 OFFICE O/P EST MOD 30 MIN: CPT | Performed by: PHYSICIAN ASSISTANT

## 2025-02-11 RX ORDER — IOPAMIDOL 755 MG/ML
90 INJECTION, SOLUTION INTRAVASCULAR ONCE
Status: COMPLETED | OUTPATIENT
Start: 2025-02-11 | End: 2025-02-11

## 2025-02-11 RX ADMIN — IOPAMIDOL 90 ML: 755 INJECTION, SOLUTION INTRAVENOUS at 12:59

## 2025-02-11 ASSESSMENT — COLUMBIA-SUICIDE SEVERITY RATING SCALE - C-SSRS
1. IN THE PAST MONTH, HAVE YOU WISHED YOU WERE DEAD OR WISHED YOU COULD GO TO SLEEP AND NOT WAKE UP?: NO
2. HAVE YOU ACTUALLY HAD ANY THOUGHTS OF KILLING YOURSELF IN THE PAST MONTH?: NO
6. HAVE YOU EVER DONE ANYTHING, STARTED TO DO ANYTHING, OR PREPARED TO DO ANYTHING TO END YOUR LIFE?: NO

## 2025-02-11 ASSESSMENT — ACTIVITIES OF DAILY LIVING (ADL)
ADLS_ACUITY_SCORE: 49

## 2025-02-11 NOTE — DISCHARGE INSTRUCTIONS
You been referred to the rapid access heart clinic.  They should contact you in a day or 2 to arrange follow-up.  Recheck with your primary care doctor.  Return to the ER for any worsening symptoms

## 2025-02-11 NOTE — ED TRIAGE NOTES
Pt here with 2 days ago sob with exertion and dizziness. States he had a PE in the past and feels the same. States he still takes the Eliquis.      Triage Assessment (Adult)       Row Name 02/11/25 1200          Triage Assessment    Airway WDL WDL        Respiratory WDL    Respiratory WDL rhythm/pattern     Rhythm/Pattern, Respiratory dyspnea upon exertion;shortness of breath        Skin Circulation/Temperature WDL    Skin Circulation/Temperature WDL WDL        Cardiac WDL    Cardiac WDL WDL        Peripheral/Neurovascular WDL    Peripheral Neurovascular WDL WDL        Cognitive/Neuro/Behavioral WDL    Cognitive/Neuro/Behavioral WDL WDL

## 2025-02-11 NOTE — ED PROVIDER NOTES
EMERGENCY DEPARTMENT ENCOUNTER      NAME: Sharad Yi  AGE: 75 year old male  YOB: 1950  MRN: [id number]  EVALUATION DATE & TIME: 2/11/2025 12:05 PM    PCP: Leonardo Perez    ED PROVIDER: Chris Delgadillo MD      Chief Complaint   Patient presents with    Shortness of Breath    Dizziness         FINAL IMPRESSION:  1. MCKINLEY (dyspnea on exertion)          ED COURSE & MEDICAL DECISION MAKING:    Pertinent Labs & Imaging studies reviewed. (See chart for details)  75 year old male presents to the Emergency Department for evaluation of dyspnea on exertion.  Symptoms feel similar to previous PE.  Does not miss any doses of anticoagulant.  At rest feels fine.  Denies URI symptoms.  Denies bleeding.    Plan for hemoglobin, troponin, EKG, CTA PE, BMP to evaluate for PE, pneumonia, pulmonary edema, COVID flu Rylee, anemia, ACS        ED Course as of 02/11/25 1458   Tue Feb 11, 2025   1249 I met with the patient, obtained history, performed an initial exam, and discussed options and plan for diagnostics and treatment here in the ED.    1447 I updated the patient on results and discussed further plan of care. Shared decision-making on choices of admission for observation or discharge to home with close cardiology follow-up. Opted for discharge as he is currently asymptomatic.   1451 Troponin T, High Sensitivity: 21  Not ruling in for ACS.  Currently symptom-free   1451 Hemoglobin(!): 10.9  Denies black tarry stools or bright red blood per rectum or any bleeding   1451 Currently symptom-free.  Shared decision making about admission for observation for further cardiac workup versus follow-up with rapid access heart clinic.  Patient wants to follow-up.   1457 I independently viewed CT a PE and did not note any large pulmonary emboli   We will place emergent referral to rapid access heart clinic.  Will have patient follow-up primary care doctor.  He will return the ER for worsening symptoms    Medical Decision  Making  Obtained supplemental history:Supplemental history obtained?: No  Reviewed external records: External records reviewed?: Documented in chart and Outpatient Record:  visit 2/11/2025  Care impacted by chronic illness:Hypertension  Did you consider but not order tests?: Work up considered but not performed and documented in chart, if applicable  Did you interpret images independently?: Independent interpretation of ECG and images noted in documentation, when applicable.  Consultation discussion with other provider:Did you involve another provider (consultant, , pharmacy, etc.)?: No  Discharge. No recommendations on prescription strength medication(s). I considered admission, but discharged the patient after share decision making conversation.    MIPS: CT Pulmonary Angiogram:Patient is moderate to high risk for PE.       At the conclusion of the encounter I discussed the results of all of the tests and the disposition. The questions were answered. The patient or family acknowledged understanding and was agreeable with the care plan.   MEDICATIONS GIVEN IN THE EMERGENCY:  Medications   iopamidol (ISOVUE-370) solution 90 mL (90 mLs Intravenous $Given 2/11/25 2864)       NEW PRESCRIPTIONS STARTED AT TODAY'S ER VISIT  New Prescriptions    No medications on file          =================================================================    TRIAGE ASSESSMENT:  Pt here with 2 days ago sob with exertion and dizziness. States he had a PE in the past and feels the same. States he still takes the Eliquis.      Triage Assessment (Adult)       Row Name 02/11/25 1200          Triage Assessment    Airway WDL WDL        Respiratory WDL    Respiratory WDL rhythm/pattern     Rhythm/Pattern, Respiratory dyspnea upon exertion;shortness of breath        Skin Circulation/Temperature WDL    Skin Circulation/Temperature WDL WDL        Cardiac WDL    Cardiac WDL WDL        Peripheral/Neurovascular WDL    Peripheral Neurovascular WDL  WDL        Cognitive/Neuro/Behavioral WDL    Cognitive/Neuro/Behavioral WDL WDL                          HPI    Patient information was obtained from: Patient     Use of : N/A         Sharad Yi is a 75 year old male with a pertinent history of pulmonary emboilsm, anticoagulated on Eliquis, hypertension, hyperlipidemia, obstructive sleep apnea, who presents to this ED via walk-in for evaluation of shortness of breath.    Per chart review, patient was seen in [facility] on 2/11/2025 for evaluation of exertional shortness of breath. Patient presented after experiencing 2-3 days of worsening dyspnea on exertion with some lightheadedness. He reported during his episode of shortness of breath earlier today, he became very pale and was sweaty. Patient was referred to the ED for further evaluation and treatment as needed.     The patient reports that he has had worsening exertional shortness of breath over the last few days. He had some slight shortness of breath with lightheadedness yesterday, and today while he was moving boxes at his mother's house he had to sit down to catch his breath. He notes that he had a pulmonary embolism about 2 years ago and his symptoms feel similar to that. Patient denies any missed doses of his Eliquis. He endorses bilateral ankle swelling at his baseline, with no acute changes.  Denies any bloody stools or black tarry stools.    Patient has also had some rhinorrhea for the last 4 months. He just got CPAP about 3 weeks ago, which has been helping him sleep better.    Denies fever, cough, chest pain, unexplained weight gain, nausea, indigestion, tobacco use, recent travel, or any other concerns at this time. No cardiac history.     Social History: Patient's mother passed away last year and they are currently in the process of cleaning out and selling her house.       REVIEW OF SYSTEMS   Please refer to HPI.     PAST MEDICAL HISTORY:  Past Medical  History:   Diagnosis Date    Arthritis     BPH (benign prostatic hyperplasia)     Chronic knee pain     Hypertension        PAST SURGICAL HISTORY:  Past Surgical History:   Procedure Laterality Date    COLONOSCOPY  2016    LASER HOLMIUM ENUCLEATION PROSTATE N/A 2024    Procedure: ENUCLEATION, PROSTATE, USING HOLMIUM LASER,;  Surgeon: Dasia Kennedy MD;  Location: [facility] OR    MENISCECTOMY Right     ORTHOPEDIC SURGERY  Oct 2019, 2022    [facility] APPENDECTOMY      Description: Appendectomy;  Recorded: 2013;           CURRENT MEDICATIONS:    acetaminophen (TYLENOL) 500 MG tablet  apixaban ANTICOAGULANT (ELIQUIS) 5 MG tablet  atorvastatin (LIPITOR) 80 MG tablet  Cholecalciferol (VITAMIN D3 PO)  diphenhydrAMINE-acetaminophen (TYLENOL PM)  MG tablet  metoprolol succinate ER (TOPROL XL) 50 MG 24 hr tablet  senna-docusate (SENOKOT-S/PERICOLACE) 8.6-50 MG tablet        ALLERGIES:  No Known Allergies    FAMILY HISTORY:  Family History   Problem Relation Age of Onset    No Known Problems Mother         pacemaker    Prostate Cancer Father 57    Heart Disease Father 57    Diabetes Father     Deep Vein Thrombosis (DVT) Sister     No Known Problems Sister     No Known Problems Sister     Hyperlipidemia Brother     No Known Problems Brother     No Known Problems Brother     No Known Problems Brother        SOCIAL HISTORY:   Social History     Socioeconomic History    Marital status:     Number of children: 1   Occupational History    Occupation: DNR     Comment: Retired   Tobacco Use    Smoking status: Former     Current packs/day: 0.00     Average packs/day: 0.5 packs/day for 2.2 years (1.1 ttl pk-yrs)     Types: Cigarettes     Start date: 1973     Quit date: 1975     Years since quittin.6     Passive exposure: Past    Smokeless tobacco: Never    Tobacco comments:     In college   Vaping Use    Vaping status: Never Used   Substance and Sexual Activity    Alcohol use: Yes      Alcohol/week: 10.0 standard drinks of alcohol     Types: 10 Standard drinks or equivalent per week     Comment: not everyday, occassional    Drug use: No    Sexual activity: Not Currently     Partners: Female     Birth control/protection: None   Other Topics Concern    Parent/sibling w/ CABG, MI or angioplasty before 65F 55M? Yes     Comment: Father     Social Drivers of Health     Financial Resource Strain: Low Risk  (12/12/2024)    Financial Resource Strain     Within the past 12 months, have you or your family members you live with been unable to get utilities (heat, electricity) when it was really needed?: No   Food Insecurity: Low Risk  (12/12/2024)    Food Insecurity     Within the past 12 months, did you worry that your food would run out before you got money to buy more?: No     Within the past 12 months, did the food you bought just not last and you didn t have money to get more?: No   Transportation Needs: Low Risk  (12/12/2024)    Transportation Needs     Within the past 12 months, has lack of transportation kept you from medical appointments, getting your medicines, non-medical meetings or appointments, work, or from getting things that you need?: No   Physical Activity: Inactive (12/12/2024)    Exercise Vital Sign     Days of Exercise per Week: 0 days     Minutes of Exercise per Session: 0 min   Stress: No Stress Concern Present (12/12/2024)    [employer] [employer] - Occupational Stress Questionnaire     Feeling of Stress : Not at all   Social Connections: Unknown (12/12/2024)    Social Connection and Isolation Panel [NHANES]     Frequency of Social Gatherings with Friends and Family: More than three times a week   Interpersonal Safety: Low Risk  (12/17/2024)    Interpersonal Safety     Do you feel physically and emotionally safe where you currently live?: Yes     Within the past 12 months, have you been hit, slapped, kicked or otherwise physically hurt by someone?: No     Within the  "past 12 months, have you been humiliated or emotionally abused in other ways by your partner or ex-partner?: No   Housing Stability: Low Risk  (12/12/2024)    Housing Stability     Do you have housing? : Yes     Are you worried about losing your housing?: No   Recent Concern: Housing Stability - High Risk (9/16/2024)    Housing Stability     Do you have housing? : No     Are you worried about losing your housing?: No       VITALS:  BP (!) 140/82   Pulse 84   Temp 97.5  F (36.4  C) (Oral)   Resp 22   Ht 1.727 m (5' 8\")   Wt 117.9 kg (260 lb)   SpO2 100%   BMI 39.53 kg/m      PHYSICAL EXAM      Vitals: BP (!) 140/82   Pulse 84   Temp 97.5  F (36.4  C) (Oral)   Resp 22   Ht 1.727 m (5' 8\")   Wt 117.9 kg (260 lb)   SpO2 100%   BMI 39.53 kg/m    General: Appears in no acute distress, awake, alert, interactive.  Eyes: Conjunctivae non-injected. Sclera anicteric.  HENT: Atraumatic.  Neck: Supple.  Respiratory/Chest: Respiration unlabored. No wheezing, rhonchi, or murmurs.  Abdomen: Non distended.  Musculoskeletal: Normal extremities. No edema or erythema.  Skin: Normal color. No rash or diaphoresis.  Neurologic: Face symmetric, moves all extremities spontaneously. Speech clear.  Psychiatric: Oriented to person, place, and time. Affect appropriate.    LAB:  All pertinent labs reviewed and interpreted.  Results for orders placed or performed during the hospital encounter of 02/11/25   CT Chest Pulmonary Embolism w Contrast    Impression    IMPRESSION:  1.  No abnormalities are seen to explain symptoms.  2.  Specifically, no pulmonary emboli, pneumonia or changes of failure.  3.  No residual changes from prior emboli.  4.  Mild to moderate coronary artery calcifications.  5.  Cholelithiasis.  6.  Other noncritical findings as noted above.   Influenza A/B, RSV and SARS-CoV2 PCR (COVID-19) Nasopharyngeal    Specimen: Nasopharyngeal; Swab   Result Value Ref Range    Influenza A PCR Negative Negative    Influenza B " PCR Negative Negative    RSV PCR Negative Negative    SARS CoV2 PCR Negative Negative   Result Value Ref Range    Troponin T, High Sensitivity 23 (H) <=22 ng/L   Basic metabolic panel   Result Value Ref Range    Sodium 140 135 - 145 mmol/L    Potassium 4.2 3.4 - 5.3 mmol/L    Chloride 106 98 - 107 mmol/L    Carbon Dioxide (CO2) 22 22 - 29 mmol/L    Anion Gap 12 7 - 15 mmol/L    Urea Nitrogen 30.3 (H) 8.0 - 23.0 mg/dL    Creatinine 0.96 0.67 - 1.17 mg/dL    GFR Estimate 82 >60 mL/min/1.73m2    Calcium 9.2 8.8 - 10.4 mg/dL    Glucose 145 (H) 70 - 99 mg/dL   D dimer quantitative   Result Value Ref Range    D-Dimer Quantitative <=0.27 0.00 - 0.50 ug/mL FEU   CBC with platelets and differential   Result Value Ref Range    WBC Count 8.0 4.0 - 11.0 10e3/uL    RBC Count 3.49 (L) 4.40 - 5.90 10e6/uL    Hemoglobin 10.9 (L) 13.3 - 17.7 g/dL    Hematocrit 32.7 (L) 40.0 - 53.0 %    MCV 94 78 - 100 fL    MCH 31.2 26.5 - 33.0 pg    MCHC 33.3 31.5 - 36.5 g/dL    RDW 13.7 10.0 - 15.0 %    Platelet Count 219 150 - 450 10e3/uL    % Neutrophils 65 %    % Lymphocytes 26 %    % Monocytes 8 %    % Eosinophils 1 %    % Basophils 0 %    % Immature Granulocytes 1 %    NRBCs per 100 WBC 0 <1 /100    Absolute Neutrophils 5.2 1.6 - 8.3 10e3/uL    Absolute Lymphocytes 2.1 0.8 - 5.3 10e3/uL    Absolute Monocytes 0.6 0.0 - 1.3 10e3/uL    Absolute Eosinophils 0.1 0.0 - 0.7 10e3/uL    Absolute Basophils 0.0 0.0 - 0.2 10e3/uL    Absolute Immature Granulocytes 0.0 <=0.4 10e3/uL    Absolute NRBCs 0.0 10e3/uL   Result Value Ref Range    Troponin T, High Sensitivity 21 <=22 ng/L   Nt probnp inpatient   Result Value Ref Range    N terminal Pro BNP Inpatient 42 0 - 1,800 pg/mL   ECG 12-LEAD WITH MUSE (L[facility])   Result Value Ref Range    Systolic Blood Pressure  mmHg    Diastolic Blood Pressure  mmHg    Ventricular Rate 102 BPM    Atrial Rate 102 BPM    KS Interval 162 ms    QRS Duration 132 ms     ms    QTc 497 ms    P Axis 54 degrees    R AXIS -58  degrees    T Axis 31 degrees    Interpretation ECG       Sinus tachycardia  Right bundle branch block  Left anterior fascicular block  ** Bifascicular block **  Abnormal ECG  When compared with ECG of 09-Sep-2024 09:45,  Vent. rate has increased by  39 bpm  Nonspecific T wave abnormality has replaced inverted T waves in Inferior leads  Confirmed by SEE ED PROVIDER NOTE FOR, ECG INTERPRETATION ([id number]),  CHARITO DUTTA ([id number]) on 2/11/2025 2:02:06 PM         RADIOLOGY:  Reviewed all pertinent imaging. Please see official radiology report.  CT Chest Pulmonary Embolism w Contrast   Final Result   IMPRESSION:   1.  No abnormalities are seen to explain symptoms.   2.  Specifically, no pulmonary emboli, pneumonia or changes of failure.   3.  No residual changes from prior emboli.   4.  Mild to moderate coronary artery calcifications.   5.  Cholelithiasis.   6.  Other noncritical findings as noted above.          EKG:    Performed at: 11-Feb-2025 12:09    Impression: Sinus tachycardia. Right bundle branch block. Left anterior fascicular block. Bifascicular block. When compared with ECG of 09-Sep-2024 09:45, vent. Rate has increased by 39 bpm, nonspecific T wave abnormality has replaced inverted T waves in inferior leads.     Rate: 102 bpm  Rhythm: Sinus  Axis: 54 -58 31  MT Interval: 162 ms  QRS Interval: 132 ms  QTc Interval: 382/497 ms  Comparison: When compared with ECG of 09-Sep-2024 09:45, vent. Rate has increased by 39 bpm, nonspecific T wave abnormality has replaced inverted T waves in inferior leads.     I have independently reviewed and interpreted the EKG(s) documented above.        I, Fabienne Tamez, am serving as a scribe to document services personally performed by Chris Delgadillo based on my observation and the provider's statements to me. I, Chris Delgadillo MD, attest that Fabienne Tamez is acting in a scribe capacity, has observed my performance of the services and has documented them in accordance with  my direction.    Chris Delgadilol MD  [facility] EMERGENCY ROOM  [address] [id number] [phone]      Chris Delgadillo MD  02/11/25 [id number]

## 2025-02-11 NOTE — PROGRESS NOTES
Assessment & Plan:      Problem List Items Addressed This Visit    None  Visit Diagnoses       Dyspnea on exertion    -  Primary          Medical Decision Making  Patient presents with dyspnea on exertion worsening over the last 2 to 3 days.  Symptoms concerning for congestive heart failure versus recurrent pulmonary embolism.  Recommend patient present immediately to the emergency room for further evaluation and treatment as needed.     Subjective:      Sharad Yi is a 75 year old male here for evaluation of dyspnea on exertion.  Onset of symptoms was 2 to 3 days ago.  Patient notes with very light exertion he becomes short of breath and lightheaded.  Patient also has history of blood clots and is currently on Eliquis.  Patient also has chronic history of swelling in the lower extremities with no previous diagnosis of congestive heart failure.  During the episode of shortness of breath today, patient became very pale and was sweaty.  After some down for few minutes symptoms improved.     The following portions of the patient's history were reviewed and updated as appropriate: allergies, current medications, and problem list.     Review of Systems  Pertinent items are noted in HPI.    Allergies  No Known Allergies    Family History   Problem Relation Age of Onset    No Known Problems Mother         pacemaker    Prostate Cancer Father 57    Heart Disease Father 57    Diabetes Father     Deep Vein Thrombosis (DVT) Sister     No Known Problems Sister     No Known Problems Sister     Hyperlipidemia Brother     No Known Problems Brother     No Known Problems Brother     No Known Problems Brother        Social History     Tobacco Use    Smoking status: Former     Current packs/day: 0.00     Average packs/day: 0.5 packs/day for 2.2 years (1.1 ttl pk-yrs)     Types: Cigarettes     Start date: 1973     Quit date: 1975     Years since quittin.6     Passive exposure: Past    Smokeless tobacco: Never     Tobacco comments:     In college   Substance Use Topics    Alcohol use: Yes     Alcohol/week: 10.0 standard drinks of alcohol     Types: 10 Standard drinks or equivalent per week     Comment: not everyday, occassional        Objective:      /87   Pulse 94   Temp 97.5  F (36.4  C) (Oral)   Resp 16   Wt 118.8 kg (262 lb)   SpO2 99%   BMI 39.84 kg/m    General appearance - alert, well appearing, and in no distress and non-toxic  Ears - bilateral TM's and external ear canals normal  Nose - normal and patent, no erythema, discharge or polyps  Mouth - mucous membranes moist, pharynx normal without lesions  Neck - supple, no significant adenopathy  Chest - clear to auscultation, no wheezes, rales or rhonchi, symmetric air entry  Heart - normal rate, regular rhythm, normal S1, S2, no murmurs, rubs, clicks or gallops  Extremities - pitting edema in the lower extremities, worse on right compared to left    The use of Dragon/Davia dictation services was used to construct the content of this note; any grammatical errors are non-intentional. Please contact the author directly if you are in need of any clarification.

## 2025-02-12 ENCOUNTER — PATIENT OUTREACH (OUTPATIENT)
Dept: FAMILY MEDICINE | Facility: CLINIC | Age: 75
End: 2025-02-12
Payer: COMMERCIAL

## 2025-02-12 NOTE — TELEPHONE ENCOUNTER
Transitions of Care Outreach  Chief Complaint   Patient presents with    Hospital F/U       Most Recent Admission Date: 2/11/2025   Most Recent Admission Diagnosis:      Most Recent Discharge Date: 2/11/2025   Most Recent Discharge Diagnosis: MCKINLEY (dyspnea on exertion) - R06.09     Transitions of Care Assessment    Discharge Assessment  How are you doing now that you are home?: Doing ok since gettng home  How are your symptoms? (Red Flag symptoms escalate to triage hotline per guidelines): Unchanged  Do you know how to contact your clinic care team if you have future questions or changes to your health status? : Yes  Does the patient have their discharge instructions? : Yes  Does the patient have questions regarding their discharge instructions? : No  Were you started on any new medications or were there changes to any of your previous medications? : No  Does the patient have all of their medications?: Yes  Do you have questions regarding any of your medications? : No  Do you have all of your needed medical supplies or equipment (DME)?  (i.e. oxygen tank, CPAP, cane, etc.): Yes    Follow up Plan     Discharge Follow-Up  Discharge follow up appointment scheduled in alignment with recommended follow up timeframe or Transitions of Risk Category? (Low = within 30 days; Moderate= within 14 days; High= within 7 days): Yes  Discharge Follow Up Appointment Date: 02/17/25  Discharge Follow Up Appointment Scheduled with?: Primary Care Provider    Future Appointments   Date Time Provider Department Center   2/13/2025  7:50 AM Sammy Jones MD [facility]   2/17/2025  2:00 PM Arden Segura MD [facility]   3/26/2025 10:00 AM Vincent Culver MD [facility]   7/8/2025 10:00 AM Gaviota Wadsworth PA-C [facility]   1/14/2026 10:00 AM Radhika Leonardo PA-C [facility]       Outpatient Plan as outlined on AVS reviewed with patient.    For any urgent concerns, please contact our 24 hour  nurse triage line: [phone] ([phone])       Hiral Atwood RN

## 2025-02-17 ENCOUNTER — OFFICE VISIT (OUTPATIENT)
Dept: FAMILY MEDICINE | Facility: CLINIC | Age: 75
End: 2025-02-17
Payer: COMMERCIAL

## 2025-02-17 VITALS
TEMPERATURE: 97.6 F | HEIGHT: 68 IN | SYSTOLIC BLOOD PRESSURE: 130 MMHG | WEIGHT: 256.1 LBS | HEART RATE: 72 BPM | OXYGEN SATURATION: 99 % | RESPIRATION RATE: 14 BRPM | DIASTOLIC BLOOD PRESSURE: 80 MMHG | BODY MASS INDEX: 38.81 KG/M2

## 2025-02-17 DIAGNOSIS — K29.60 NSAID INDUCED GASTRITIS: ICD-10-CM

## 2025-02-17 DIAGNOSIS — Z09 HOSPITAL DISCHARGE FOLLOW-UP: Primary | ICD-10-CM

## 2025-02-17 DIAGNOSIS — K29.61 EROSIVE GASTRITIS WITH HEMORRHAGE: ICD-10-CM

## 2025-02-17 DIAGNOSIS — Z86.711 HISTORY OF PULMONARY EMBOLISM: ICD-10-CM

## 2025-02-17 DIAGNOSIS — D62 ANEMIA DUE TO ACUTE BLOOD LOSS: ICD-10-CM

## 2025-02-17 DIAGNOSIS — E66.01 MORBID OBESITY (H): ICD-10-CM

## 2025-02-17 DIAGNOSIS — T39.395A NSAID INDUCED GASTRITIS: ICD-10-CM

## 2025-02-17 LAB
BASOPHILS # BLD AUTO: 0 10E3/UL (ref 0–0.2)
BASOPHILS NFR BLD AUTO: 0 %
EOSINOPHIL # BLD AUTO: 0.2 10E3/UL (ref 0–0.7)
EOSINOPHIL NFR BLD AUTO: 3 %
ERYTHROCYTE [DISTWIDTH] IN BLOOD BY AUTOMATED COUNT: 15.9 % (ref 10–15)
FERRITIN SERPL-MCNC: 72 NG/ML (ref 31–409)
HCT VFR BLD AUTO: 29.1 % (ref 40–53)
HGB BLD-MCNC: 9.5 G/DL (ref 13.3–17.7)
IMM GRANULOCYTES # BLD: 0 10E3/UL
IMM GRANULOCYTES NFR BLD: 0 %
IRON BINDING CAPACITY (ROCHE): 296 UG/DL (ref 240–430)
IRON SATN MFR SERPL: 15 % (ref 15–46)
IRON SERPL-MCNC: 43 UG/DL (ref 61–157)
LYMPHOCYTES # BLD AUTO: 2.1 10E3/UL (ref 0.8–5.3)
LYMPHOCYTES NFR BLD AUTO: 30 %
MCH RBC QN AUTO: 31.6 PG (ref 26.5–33)
MCHC RBC AUTO-ENTMCNC: 32.6 G/DL (ref 31.5–36.5)
MCV RBC AUTO: 97 FL (ref 78–100)
MONOCYTES # BLD AUTO: 0.5 10E3/UL (ref 0–1.3)
MONOCYTES NFR BLD AUTO: 8 %
NEUTROPHILS # BLD AUTO: 4.1 10E3/UL (ref 1.6–8.3)
NEUTROPHILS NFR BLD AUTO: 59 %
PLATELET # BLD AUTO: 261 10E3/UL (ref 150–450)
RBC # BLD AUTO: 3.01 10E6/UL (ref 4.4–5.9)
WBC # BLD AUTO: 7 10E3/UL (ref 4–11)

## 2025-02-17 PROCEDURE — 99214 OFFICE O/P EST MOD 30 MIN: CPT | Performed by: STUDENT IN AN ORGANIZED HEALTH CARE EDUCATION/TRAINING PROGRAM

## 2025-02-17 PROCEDURE — 83540 ASSAY OF IRON: CPT | Performed by: STUDENT IN AN ORGANIZED HEALTH CARE EDUCATION/TRAINING PROGRAM

## 2025-02-17 PROCEDURE — 82728 ASSAY OF FERRITIN: CPT | Performed by: STUDENT IN AN ORGANIZED HEALTH CARE EDUCATION/TRAINING PROGRAM

## 2025-02-17 PROCEDURE — 85025 COMPLETE CBC W/AUTO DIFF WBC: CPT | Performed by: STUDENT IN AN ORGANIZED HEALTH CARE EDUCATION/TRAINING PROGRAM

## 2025-02-17 PROCEDURE — 36415 COLL VENOUS BLD VENIPUNCTURE: CPT | Performed by: STUDENT IN AN ORGANIZED HEALTH CARE EDUCATION/TRAINING PROGRAM

## 2025-02-17 PROCEDURE — 83550 IRON BINDING TEST: CPT | Performed by: STUDENT IN AN ORGANIZED HEALTH CARE EDUCATION/TRAINING PROGRAM

## 2025-02-17 RX ORDER — FERROUS SULFATE 325(65) MG
325 TABLET ORAL
Qty: 90 TABLET | Refills: 0 | Status: SHIPPED | OUTPATIENT
Start: 2025-02-17

## 2025-02-17 RX ORDER — OMEPRAZOLE 20 MG/1
20 CAPSULE, DELAYED RELEASE ORAL 2 TIMES DAILY
Qty: 180 CAPSULE | Refills: 0 | Status: SHIPPED | OUTPATIENT
Start: 2025-02-17 | End: 2025-02-17

## 2025-02-17 RX ORDER — OMEPRAZOLE 20 MG/1
20 CAPSULE, DELAYED RELEASE ORAL 2 TIMES DAILY
Qty: 180 CAPSULE | Refills: 0 | Status: SHIPPED | OUTPATIENT
Start: 2025-02-17

## 2025-02-17 RX ORDER — OMEPRAZOLE 20 MG/1
CAPSULE, DELAYED RELEASE ORAL
COMMUNITY
Start: 2025-02-14 | End: 2025-08-13

## 2025-02-17 NOTE — PROGRESS NOTES
Assessment & Plan     Hospital discharge follow-up  NSAID induced gastritis  Erosive gastritis with hemorrhage  Anemia due to acute blood loss    Damien is a 75-year-old male with history of hyperlipidemia, hypertension, prediabetes, obstructive sleep apnea, nonalcoholic fatty liver disease, BPH, and history of pulmonary embolism presenting for hospital follow-up after hematemesis.  Hematemesis was secondary to erosive gastritis, likely secondary to NSAID use.  He reports using Motrin nightly prior to his hospitalization.    Had significant anemia, with hemoglobin falling from his usual 14.0 level when last checked in September 2024, to 8.5 when he was hospitalized.  A EGD was done, this demonstrated erosive gastritis, without active bleeding at that time.  This is likely the cause of his bleeding, and as mentioned above, likely secondary to NSAID overuse.    H. pylori biopsy was taken, currently pending results.    He has not had any bleeding since his hospitalization, same hemoglobin has been stable.    We discussed signs of rebleeding, such as hematemesis, melena.  Encouraged him to go immediately to the emergency room seeing any signs of rebleeding.    Recommended that he continue omeprazole at the 40 mg daily dose that he is currently doing, should maintain this for at least 3 months, and then should continue on omeprazole for 6 to 12 months.    He should strictly avoid NSAID use, 1 because he has had now an episode of erosive gastritis and to because he is already on Eliquis.  He reports understanding he will no longer use NSAIDs he will use Tylenol as needed.     Recommend that we start an iron supplements as he is likely to become iron deficient as he is replacing his blood stores.  He may take it every other day.  Should obtain MiraLAX for prophylaxis    Will obtain CBC today, and iron panel.       - CBC with platelets and differential  - Iron and iron binding capacity  - Ferritin  - ferrous sulfate  (CATHI) 325 (65 Fe) MG tablet  Dispense: 90 tablet; Refill: 0    Morbid obesity (H)    Patient has lost some weight since his hospitalization, due to not eating well.  I encouraged healthy eating, regular exercise.  Recommend for 10% body weight loss over the next 6 months.        History of pulmonary embolism  On anticoagulation with apixaban 5 mg twice daily.  No signs of active bleeding, recommend continue Eliquis at this time.    MED REC REQUIRED  Post Medication Reconciliation Status: discharge medications reconciled and changed, per note/orders        Subjective   Damien is a 75 year old, presenting for the following health issues:  Hospital F/U (02/12/2025 - Hematemesis, unspecified whether nausea present. Reports feeling better but still having some SOB.)        2/17/2025     1:58 PM   Additional Questions   Roomed by VENICE ROMAN     Last Tuesday, he was helping to move things out of a basement.  He started to get chest pain.  HE went to the ED and he was evaluated, and was told he was okay and was sent home.   On Wednesday, he started to feel worse, was getting dry heaves and was without energy.  Wednesday night, he had some dry heaves and then had blood vomiting. The blood was clumpy and dark red colored. Paramedic was called and they brought him into the hospital. He was evaluated at the ED, put on oxygen and he was given a transfusion the next day.     Has had heart burn in the past, but does not have it often anymore.  Will feel a little burning sensation in the chest from time to time but it goes away. He feels this less than monthly. He uses ibuprofen to relieve leg warmth at night. He uses the ibuprofen about every night, but has stopped since the ED visit.             2/12/2025   Post Discharge Outreach   How are you doing now that you are home? Doing ok since gettng home   How are your symptoms? (Red Flag symptoms escalate to triage hotline per guidelines) Unchanged   Does the patient have their  "discharge instructions?  Yes   Does the patient have questions regarding their discharge instructions?  No   Were you started on any new medications or were there changes to any of your previous medications?  No   Does the patient have all of their medications? Yes   Do you have questions regarding any of your medications?  No   Do you have all of your needed medical supplies or equipment (DME)?  (i.e. oxygen tank, CPAP, cane, etc.) Yes   Discharge Follow Up Appointment Date 2/17/2025   Discharge Follow Up Appointment Scheduled with? Primary Care Provider       Hospital Follow-up Visit:    Hospital/Nursing Home/IP Rehab Facility:  [facility]  Date of Admission: 02/12/2025  Date of Discharge: 02/14/2025  Reason(s) for Admission: Hematemesis, unspecified whether nausea present   Was the patient in the ICU or did the patient experience delirium during hospitalization?  No  Do you have any other stressors you would like to discuss with your provider? No    Problems taking medications regularly:  None  Medication changes since discharge: Omeprazole 20 mg  Problems adhering to non-medication therapy:  None    Summary of hospitalization:  CareEverywhere information obtained and reviewed  Diagnostic Tests/Treatments reviewed.  Follow up needed: CBC  Other Healthcare Providers Involved in Patient s Care:         None  Update since discharge: improved.         Plan of care communicated with patient                   Review of Systems  Constitutional, neuro, ENT, endocrine, pulmonary, cardiac, gastrointestinal, genitourinary, musculoskeletal, integument and psychiatric systems are negative, except as otherwise noted.      Objective    /80 (BP Location: Right arm, Patient Position: Sitting, Cuff Size: Adult Large)   Pulse 72   Temp 97.6  F (36.4  C)   Resp 14   Ht 1.727 m (5' 8\")   Wt 116.2 kg (256 lb 1.6 oz)   SpO2 99%   BMI 38.94 kg/m    Body mass index is 38.94 kg/m .  Physical Exam   GENERAL: alert and no " distress  NECK: no adenopathy, no asymmetry, masses, or scars  MS: no gross musculoskeletal defects noted, no edema  SKIN: no suspicious lesions or rashes  NEURO: Normal strength and tone, mentation intact and speech normal    Admission on 02/11/2025, Discharged on 02/11/2025   Component Date Value Ref Range Status    Ventricular Rate 02/11/2025 102  BPM Final    Atrial Rate 02/11/2025 102  BPM Final    AZ Interval 02/11/2025 162  ms Final    QRS Duration 02/11/2025 132  ms Final    QT 02/11/2025 382  ms Final    QTc 02/11/2025 497  ms Final    P Axis 02/11/2025 54  degrees Final    R AXIS 02/11/2025 -58  degrees Final    T Axis 02/11/2025 31  degrees Final    Interpretation ECG 02/11/2025    Final                    Value:Sinus tachycardia  Right bundle branch block  Left anterior fascicular block  ** Bifascicular block **  Abnormal ECG  When compared with ECG of 09-Sep-2024 09:45,  Vent. rate has increased by  39 bpm  Nonspecific T wave abnormality has replaced inverted T waves in Inferior leads  Confirmed by SEE ED PROVIDER NOTE FOR, ECG INTERPRETATION ([id number]),  CHARITO DUTTA ([id number]) on 2/11/2025 2:02:06 PM      Influenza A PCR 02/11/2025 Negative  Negative Final    Influenza B PCR 02/11/2025 Negative  Negative Final    RSV PCR 02/11/2025 Negative  Negative Final    SARS CoV2 PCR 02/11/2025 Negative  Negative Final    NEGATIVE: SARS-CoV-2 (COVID-19) RNA not detected, presumed negative.    Troponin T, High Sensitivity 02/11/2025 23 (H)  <=22 ng/L Final    Either a High Sensitivity Troponin T baseline (0 hours) value = 100 ng/L, or an increase in High Sensitivity Troponin T = 7 ng/L at 2 hours compared to 0 hours (2-0 hours), suggests myocardial injury, and urgent clinical attention is required.    If the 2-0 hours increase is <7 ng/L, a High Sensitivity Troponin T result above gender-specific reference ranges warrants further evaluation.   Recommendations for further evaluation include correlation with  clinical decision-making tool (e.g., HEART), a 3rd High Sensitivity Troponin T test 2 hours after the 2nd (a 20% change from baseline would represent concern), admission for observation, close PCC/cardiology follow-up, or urgent outpatient provocative testing.    Sodium 02/11/2025 140  135 - 145 mmol/L Final    Potassium 02/11/2025 4.2  3.4 - 5.3 mmol/L Final    Chloride 02/11/2025 106  98 - 107 mmol/L Final    Carbon Dioxide (CO2) 02/11/2025 22  22 - 29 mmol/L Final    Anion Gap 02/11/2025 12  7 - 15 mmol/L Final    Urea Nitrogen 02/11/2025 30.3 (H)  8.0 - 23.0 mg/dL Final    Creatinine 02/11/2025 0.96  0.67 - 1.17 mg/dL Final    GFR Estimate 02/11/2025 82  >60 mL/min/1.73m2 Final    eGFR calculated using 2021 CKD-EPI equation.    Calcium 02/11/2025 9.2  8.8 - 10.4 mg/dL Final    Glucose 02/11/2025 145 (H)  70 - 99 mg/dL Final    D-Dimer Quantitative 02/11/2025 <=0.27  0.00 - 0.50 ug/mL FEU Final    WBC Count 02/11/2025 8.0  4.0 - 11.0 10e3/uL Final    RBC Count 02/11/2025 3.49 (L)  4.40 - 5.90 10e6/uL Final    Hemoglobin 02/11/2025 10.9 (L)  13.3 - 17.7 g/dL Final    Hematocrit 02/11/2025 32.7 (L)  40.0 - 53.0 % Final    MCV 02/11/2025 94  78 - 100 fL Final    MCH 02/11/2025 31.2  26.5 - 33.0 pg Final    MCHC 02/11/2025 33.3  31.5 - 36.5 g/dL Final    RDW 02/11/2025 13.7  10.0 - 15.0 % Final    Platelet Count 02/11/2025 219  150 - 450 10e3/uL Final    % Neutrophils 02/11/2025 65  % Final    % Lymphocytes 02/11/2025 26  % Final    % Monocytes 02/11/2025 8  % Final    % Eosinophils 02/11/2025 1  % Final    % Basophils 02/11/2025 0  % Final    % Immature Granulocytes 02/11/2025 1  % Final    NRBCs per 100 WBC 02/11/2025 0  <1 /100 Final    Absolute Neutrophils 02/11/2025 5.2  1.6 - 8.3 10e3/uL Final    Absolute Lymphocytes 02/11/2025 2.1  0.8 - 5.3 10e3/uL Final    Absolute Monocytes 02/11/2025 0.6  0.0 - 1.3 10e3/uL Final    Absolute Eosinophils 02/11/2025 0.1  0.0 - 0.7 10e3/uL Final    Absolute Basophils  02/11/2025 0.0  0.0 - 0.2 10e3/uL Final    Absolute Immature Granulocytes 02/11/2025 0.0  <=0.4 10e3/uL Final    Absolute NRBCs 02/11/2025 0.0  10e3/uL Final    Troponin T, High Sensitivity 02/11/2025 21  <=22 ng/L Final    Either a High Sensitivity Troponin T baseline (0 hours) value = 100 ng/L, or an increase in High Sensitivity Troponin T = 7 ng/L at 2 hours compared to 0 hours (2-0 hours), suggests myocardial injury, and urgent clinical attention is required.    If the 2-0 hours increase is <7 ng/L, a High Sensitivity Troponin T result above gender-specific reference ranges warrants further evaluation.   Recommendations for further evaluation include correlation with clinical decision-making tool (e.g., HEART), a 3rd High Sensitivity Troponin T test 2 hours after the 2nd (a 20% change from baseline would represent concern), admission for observation, close PCC/cardiology follow-up, or urgent outpatient provocative testing.    N terminal Pro BNP Inpatient 02/11/2025 42  0 - 1,800 pg/mL Final    Reference range shown and results flagged as abnormal are suggested inpatient cut points for confirming diagnosis if CHF in an acute setting. Establishing a baseline value for each individual patient is useful for follow-up. An inpatient or emergency department NT-proPBNP <300 pg/mL effectively rules out acute CHF, with 99% negative predictive value.    The outpatient non-acute reference range for ruling out CHF is:  0-125 pg/mL (age 18 to less than 75)  0-450 pg/mL (age 75 yrs and older)          Narrative & Impression   EXAM: CT CHEST PULMONARY EMBOLISM W CONTRAST  LOCATION: [facility]  DATE: 2/11/2025     INDICATION: sob with exertion, feels similar to previous PE  COMPARISON: CTA chest 7/28/2023  TECHNIQUE: CT chest pulmonary angiogram during arterial phase injection of IV contrast. Multiplanar reformats and MIP reconstructions were performed. Dose reduction techniques were used.   CONTRAST:  90 ml isovue 370     FINDINGS:  ANGIOGRAM CHEST: Excellent opacification of pulmonary arteries and branches. No pulmonary emboli. No wall thickening or a residua from the extensive clot noted before. Main pulmonary artery is of normal caliber. Aorta and branches are unremarkable.       LUNGS AND PLEURA: No acute parenchymal disease. No effusions. Subpleural fat deposition bilaterally.     MEDIASTINUM/AXILLAE: Thyroid is unremarkable. No adenopathy.     CORONARY ARTERY CALCIFICATION: Mild to moderate.     UPPER ABDOMEN: Cyst in the left lobe again noted. Tiny dependent gallstone within the neck. Redundant colon with large stool burden again noted.      MUSCULOSKELETAL:  No concerning bone lesion. No fractures. Changes of DISH in the thoracic spine.                                                                      IMPRESSION:  1.  No abnormalities are seen to explain symptoms.  2.  Specifically, no pulmonary emboli, pneumonia or changes of failure.  3.  No residual changes from prior emboli.  4.  Mild to moderate coronary artery calcifications.  5.  Cholelithiasis.  6.  Other noncritical findings as noted above.     EGD  Order: [id number]  Narrative    Instrument Name: 350  Indications:           Coffee-ground emesis  Providers:             Serafin Conroy MD, Jessa Dewitt, Brigette Garcia RN, Vikas Taylor (Fellow)  Patient Profile:       75 y.o. male w/ hx of PE on eliquis(last dose                         yesterday), HTN, HLD, ANJU who presents with coffee                         ground emesis, EGD for further evaluation.  Medicines:             Fentanyl 100 micrograms IV, Midazolam 3 mg IV  Complications:         No immediate complications.  Procedure:             After obtaining informed consent, the endoscope was                         passed under direct vision. Prior to sedation,                         patient identity and procedure was reverified.                          Throughout the procedure, the patient's blood                         pressure, pulse, and oxygen saturations were                         monitored continuously. The GIF-H190 was introduced                         through the mouth, and advanced to the second part                         of duodenum. The upper GI endoscopy was                         accomplished without difficulty. The patient                         tolerated the procedure well.  Findings:       The examined esophagus was normal.       The Z-line was regular.       Patchy moderately erythematous mucosa without bleeding was found in       the entire examined stomach.       Multiple dispersed medium erosions with no stigmata of recent       bleeding were found in the gastric antrum. Biopsies were taken with a       cold forceps for Helicobacter pylori testing (antrum/body)       Patchy mildly erythematous mucosa without active bleeding and with no       stigmata of bleeding was found in the duodenal bulb.       The exam of the duodenum was otherwise normal. Biopsies were taken       with a cold forceps in 2nd portion  Moderate Sedation:       Moderate (conscious) sedation was administered by the nurse and       supervised by the endoscopist. The patient's oxygen saturation, heart       rate, blood pressure and response to care were monitored. Total       physician intraservice time was 19 minutes.  Impression:            - Normal esophagus.                         - Z-line regular.                         - Erythematous mucosa in the stomach.                         - Erosive gastropathy. Biopsied.                         - Erythematous duodenopathy (bulb)                         - Normal duodenal 2nd portion (biopsied)                         - No old blood or current active upper GI bleed  Recommendation:        - Await pathology results.                         - Use Prilosec (omeprazole) 20 mg PO BID for 3                         months and  then daily                         - Return patient to hospital [facility] for ongoing care.                         - Clear liquid diet.                         - Restart eliquis tomorrow.                         - Limit ASA/NSAID use as much as possible  Procedure Code(s):     --- Professional ---                         73888, GC                         , GC                         --- Technical ---                         96503, GC                         , GC  Diagnosis Code(s):     --- Professional ---                         K31.89                         K92.0                         --- Technical ---                         K31.89                         K92.0  CPT copyright 2023 [employer]. All rights reserved.  The codes documented in this report are preliminary and upon  review  may be revised to meet current compliance requirements.  Attending Participation:       I was present and participated during the entire procedure, including       non-sofia portions.  MD Serafin Lema MD  2/13/2025 12:11:48 PM  This report has been signed electronically.  Vikas Taylor,  Number of Addenda: 0  Note Initiated On: 2/13/2025 10:08 AM  Exam End: --    Specimen Collected: 02/13/25 10:08 AM Last Resulted: 02/13/25 12:12 PM   Received From: [facility]      Signed Electronically by: Arden Segura MD

## 2025-02-20 ENCOUNTER — LAB (OUTPATIENT)
Dept: LAB | Facility: CLINIC | Age: 75
End: 2025-02-20
Payer: COMMERCIAL

## 2025-02-20 DIAGNOSIS — Z09 HOSPITAL DISCHARGE FOLLOW-UP: ICD-10-CM

## 2025-02-20 DIAGNOSIS — D62 ANEMIA DUE TO ACUTE BLOOD LOSS: ICD-10-CM

## 2025-02-20 DIAGNOSIS — D62 ANEMIA DUE TO ACUTE BLOOD LOSS: Primary | ICD-10-CM

## 2025-02-20 DIAGNOSIS — K29.61 EROSIVE GASTRITIS WITH HEMORRHAGE: ICD-10-CM

## 2025-02-20 LAB
ANION GAP SERPL CALCULATED.3IONS-SCNC: 11 MMOL/L (ref 7–15)
BASOPHILS # BLD AUTO: 0 10E3/UL (ref 0–0.2)
BASOPHILS NFR BLD AUTO: 0 %
BUN SERPL-MCNC: 12 MG/DL (ref 8–23)
CALCIUM SERPL-MCNC: 9.3 MG/DL (ref 8.8–10.4)
CHLORIDE SERPL-SCNC: 107 MMOL/L (ref 98–107)
CREAT SERPL-MCNC: 1.01 MG/DL (ref 0.67–1.17)
EGFRCR SERPLBLD CKD-EPI 2021: 77 ML/MIN/1.73M2
EOSINOPHIL # BLD AUTO: 0.2 10E3/UL (ref 0–0.7)
EOSINOPHIL NFR BLD AUTO: 3 %
ERYTHROCYTE [DISTWIDTH] IN BLOOD BY AUTOMATED COUNT: 15.7 % (ref 10–15)
GLUCOSE SERPL-MCNC: 120 MG/DL (ref 70–99)
HCO3 SERPL-SCNC: 24 MMOL/L (ref 22–29)
HCT VFR BLD AUTO: 30.8 % (ref 40–53)
HGB BLD-MCNC: 9.9 G/DL (ref 13.3–17.7)
IMM GRANULOCYTES # BLD: 0 10E3/UL
IMM GRANULOCYTES NFR BLD: 0 %
LYMPHOCYTES # BLD AUTO: 1.8 10E3/UL (ref 0.8–5.3)
LYMPHOCYTES NFR BLD AUTO: 27 %
MCH RBC QN AUTO: 31 PG (ref 26.5–33)
MCHC RBC AUTO-ENTMCNC: 32.1 G/DL (ref 31.5–36.5)
MCV RBC AUTO: 97 FL (ref 78–100)
MONOCYTES # BLD AUTO: 0.5 10E3/UL (ref 0–1.3)
MONOCYTES NFR BLD AUTO: 8 %
NEUTROPHILS # BLD AUTO: 4 10E3/UL (ref 1.6–8.3)
NEUTROPHILS NFR BLD AUTO: 61 %
PLATELET # BLD AUTO: 286 10E3/UL (ref 150–450)
POTASSIUM SERPL-SCNC: 4.1 MMOL/L (ref 3.4–5.3)
RBC # BLD AUTO: 3.19 10E6/UL (ref 4.4–5.9)
SODIUM SERPL-SCNC: 142 MMOL/L (ref 135–145)
WBC # BLD AUTO: 6.5 10E3/UL (ref 4–11)

## 2025-03-20 ENCOUNTER — LAB (OUTPATIENT)
Dept: LAB | Facility: CLINIC | Age: 75
End: 2025-03-20
Attending: STUDENT IN AN ORGANIZED HEALTH CARE EDUCATION/TRAINING PROGRAM
Payer: COMMERCIAL

## 2025-03-20 DIAGNOSIS — D62 ANEMIA DUE TO ACUTE BLOOD LOSS: ICD-10-CM

## 2025-03-20 LAB
BASOPHILS # BLD AUTO: 0 10E3/UL (ref 0–0.2)
BASOPHILS NFR BLD AUTO: 0 %
EOSINOPHIL # BLD AUTO: 0.2 10E3/UL (ref 0–0.7)
EOSINOPHIL NFR BLD AUTO: 3 %
ERYTHROCYTE [DISTWIDTH] IN BLOOD BY AUTOMATED COUNT: 14.4 % (ref 10–15)
FERRITIN SERPL-MCNC: 45 NG/ML (ref 31–409)
HCT VFR BLD AUTO: 37.9 % (ref 40–53)
HGB BLD-MCNC: 11.9 G/DL (ref 13.3–17.7)
IMM GRANULOCYTES # BLD: 0 10E3/UL
IMM GRANULOCYTES NFR BLD: 0 %
IRON BINDING CAPACITY (ROCHE): 295 UG/DL (ref 240–430)
IRON SATN MFR SERPL: 18 % (ref 15–46)
IRON SERPL-MCNC: 52 UG/DL (ref 61–157)
LYMPHOCYTES # BLD AUTO: 2.2 10E3/UL (ref 0.8–5.3)
LYMPHOCYTES NFR BLD AUTO: 44 %
MCH RBC QN AUTO: 30.1 PG (ref 26.5–33)
MCHC RBC AUTO-ENTMCNC: 31.4 G/DL (ref 31.5–36.5)
MCV RBC AUTO: 96 FL (ref 78–100)
MONOCYTES # BLD AUTO: 0.5 10E3/UL (ref 0–1.3)
MONOCYTES NFR BLD AUTO: 10 %
NEUTROPHILS # BLD AUTO: 2.1 10E3/UL (ref 1.6–8.3)
NEUTROPHILS NFR BLD AUTO: 42 %
PLATELET # BLD AUTO: 244 10E3/UL (ref 150–450)
RBC # BLD AUTO: 3.95 10E6/UL (ref 4.4–5.9)
RETICS # AUTO: 0.07 10E6/UL (ref 0.03–0.1)
RETICS/RBC NFR AUTO: 1.8 % (ref 0.5–2)
WBC # BLD AUTO: 5 10E3/UL (ref 4–11)

## 2025-03-21 ASSESSMENT — SLEEP AND FATIGUE QUESTIONNAIRES
HOW LIKELY ARE YOU TO NOD OFF OR FALL ASLEEP WHILE SITTING AND TALKING TO SOMEONE: WOULD NEVER DOZE
HOW LIKELY ARE YOU TO NOD OFF OR FALL ASLEEP WHILE SITTING AND READING: MODERATE CHANCE OF DOZING
HOW LIKELY ARE YOU TO NOD OFF OR FALL ASLEEP WHILE LYING DOWN TO REST IN THE AFTERNOON WHEN CIRCUMSTANCES PERMIT: MODERATE CHANCE OF DOZING
HOW LIKELY ARE YOU TO NOD OFF OR FALL ASLEEP WHEN YOU ARE A PASSENGER IN A CAR FOR AN HOUR WITHOUT A BREAK: WOULD NEVER DOZE
HOW LIKELY ARE YOU TO NOD OFF OR FALL ASLEEP WHILE SITTING INACTIVE IN A PUBLIC PLACE: SLIGHT CHANCE OF DOZING
HOW LIKELY ARE YOU TO NOD OFF OR FALL ASLEEP WHILE SITTING QUIETLY AFTER LUNCH WITHOUT ALCOHOL: SLIGHT CHANCE OF DOZING
HOW LIKELY ARE YOU TO NOD OFF OR FALL ASLEEP WHILE WATCHING TV: SLIGHT CHANCE OF DOZING
HOW LIKELY ARE YOU TO NOD OFF OR FALL ASLEEP IN A CAR, WHILE STOPPED FOR A FEW MINUTES IN TRAFFIC: WOULD NEVER DOZE

## 2025-03-21 NOTE — RESULT ENCOUNTER NOTE
Hi Damien    Your kidney function is normal. Your lipids improved with higher statin dose. Please continue and try to eat a diet low in salt and saturated fats. Regular exercise is also important.     CBC is normal.     Good luck with Surgery!    Deyanira
no

## 2025-03-26 ENCOUNTER — OFFICE VISIT (OUTPATIENT)
Dept: SLEEP MEDICINE | Facility: CLINIC | Age: 75
End: 2025-03-26
Payer: COMMERCIAL

## 2025-03-26 VITALS
OXYGEN SATURATION: 96 % | WEIGHT: 269 LBS | HEIGHT: 68 IN | HEART RATE: 68 BPM | DIASTOLIC BLOOD PRESSURE: 93 MMHG | SYSTOLIC BLOOD PRESSURE: 146 MMHG | RESPIRATION RATE: 16 BRPM | BODY MASS INDEX: 40.77 KG/M2

## 2025-03-26 DIAGNOSIS — G47.33 OSA ON CPAP: Primary | ICD-10-CM

## 2025-03-26 PROCEDURE — 99213 OFFICE O/P EST LOW 20 MIN: CPT | Performed by: STUDENT IN AN ORGANIZED HEALTH CARE EDUCATION/TRAINING PROGRAM

## 2025-03-26 PROCEDURE — 1126F AMNT PAIN NOTED NONE PRSNT: CPT | Performed by: STUDENT IN AN ORGANIZED HEALTH CARE EDUCATION/TRAINING PROGRAM

## 2025-03-26 PROCEDURE — 3077F SYST BP >= 140 MM HG: CPT | Performed by: STUDENT IN AN ORGANIZED HEALTH CARE EDUCATION/TRAINING PROGRAM

## 2025-03-26 PROCEDURE — 3079F DIAST BP 80-89 MM HG: CPT | Performed by: STUDENT IN AN ORGANIZED HEALTH CARE EDUCATION/TRAINING PROGRAM

## 2025-03-26 NOTE — PATIENT INSTRUCTIONS
MY INFORMATION ON SLEEP APNEA-  Sharad Yi    DOCTOR : Vincent Culver MD  SLEEP CENTER :      MY CONTACT NUMBER:    [facility]  [phone]  [facility]      For general sleep health questions:   http://sleepeducation.org    For tips about PAP and COVID-19:  https://www.thoracic.org/patients/patient-resources/resources/covid-19-and-home-pap-therapy.pdf    For general info about COVID-19 including vaccines:  https://ClickpassIncentivyze.org/covid19      Continue PAP therapy every night, for all hours that you are sleeping (including naps.)  As always, try to get at least 8 hours of sleep or more each day, keep a regular sleep schedule, and avoid sleep deprivation. Avoid alcohol.  Reasons that you might need a change to your pressure therapy would be weight gain or loss, waking having inadvertently removed your PAP overnight, having previously felt refreshed by sleep with CPAP use and now waking un-refreshed, and return of daytime sleepiness. Also, the development of new medical problems  (such as heart failure, stroke, medications such as narcotics) can sometimes affect breathing at night and change your PAP therapy needs.  Please bring PAP with you if you are hospitalized.  If anticipating surgery be sure to discuss with your surgeon that you have sleep apnea and use PAP therapy.    Maintain your equipment as recommended which includes routine cleaning and replacement of supplies.      Call DME for any questions regarding supplies or maintenance.    [facility], [facility] [phone]    Do not drive on engage in potentially dangerous activities if feeling sleepy.  Please follow up in sleep clinic again in 12 months.        Tips for your PAP use-    Mask fitting tips  Mask fitting exercise:    To improve your mask seal and your mobility at night, put mask on and secure in place.  Lie down in bed with full pressure and  roll to one side, adjust headgear while in that position to eliminate any leaks. Repeat process rolling to other side.     The mask seal does not have to be perfect:   CPAP machines are designed to make up for small leaks. However, you will not tolerate leaks blowing in your eyes so you will need to adjust.   Any leak should only be near or at the bottom of the mask.  We expect your mask to leak slightly at night.    Do not over-tighten the headgear straps, tighter IS NOT better, we expect minimal leak.    First try re-positioning the mask or headgear before tightening the headgear straps.  Mask leaks are expected due to changing sleeping positions. Try pulling the mask away from your skin allowing the cushion to re-inflate will minimize the leak.  If you struggle for a good fit, try turning the CPAP off and then readjust the mask by pulling it away from your face and then turning back on the CPAP.        Humidifier tips  Humidifiers can be adjusted to increase or decrease the amount of moisture according to your comfort level. You may need to adjust this frequently at first, but then might only change it with seasonal weather changes.     Try INCREASING the humidity if:  You experience a dry, irritated nasal passage or throat.  You have a runny, drippy nose or sneezing fits after using CPAP.  You experience nasal congestion during or after CPAP use.    Try DECREASING the humidity if:  You have excessive condensation or  rain out  in the tubing or mask.  Otherwise keep the tubing warm during the night by running it underneath the blankets or pillow.      Clinic visit after initial PAP set-up   Bring your equipment with you to your 5-8 week follow up clinic visit.  We will be extracting your data from the machine if not available from the cloud based [employer].        Travel  Always take your equipment with you when you travel.  If you fly with your equipment bring it on with you as a carry on.  Medical equipment does  not count as a carry on.    If you travel international the machines take 110-240v.  The only adapter needed is the adapter that will fit into the receptacle (outlet).    You may also want to bring an extension cord as many hotel rooms have limited outlets at the bedside.  Do not travel with water in your humidifier chamber.     Cleaning and Maintenance Guidelines    Equipment Frequency Cleaning Method   Mask First Day    Daily      Weekly Soak mask in hot soapy water for 30 minutes, rinse and air dry.  Wipe nasal cushion with a hot soapy (Ivory, baby shampoo) cloth and rinse.  Baby wipes may also be used.  Do not use anti-bacterial soaps,Jaye  liquid soap, rubbing alcohol, bleach or ammonia.  Wash frame in hot soapy water (Ivory, baby shampoo) rinse and let air dry   Headgear Biweekly Wash in hot soapy water, rinse and air dry   Reusable Gray Filter Weekly Wash in hot soapy water, rinse, put in towel squeeze moisture out, let air dry   Disposable White Filter Check Weekly Replace when brown or gray in color; at least every 2 to 3 months   Humidifier Chamber Daily    Weekly Empty distilled water from humidifier and let air dry    Hand wash in hot soapy water, rinse and air dry   Tubing Weekly Wash in hot soapy water, rinse and let air dry   Mask, Tubing and Humidifier Chamber As needed Disinfect: Soak in 1 part distilled white vinegar to 3 parts hot water for 30 minutes, rinse well and air dry  Not the material headgear        MASK AND SUPPLY REORDERING and EQUIPMENT NEEDS through your DME and per your insurance  Reminder: Most insurance companies will allow for a new mask, headgear, tubing, and reusable gray filter every six months.  Disposable white ultra-fine filters are covered monthly.      HOME AND SAFETY INSTRUCTIONS  Do not use frayed or cracked electrical cords, multi plug adaptors, or switched receptacles  Do not immerse electrical equipment into water  Assure that electrical cords do not become a tripping  hazard

## 2025-03-26 NOTE — NURSING NOTE
"Chief Complaint   Patient presents with    CPAP Follow Up     Cpap compliance, needs supplies       Initial BP (!) 147/87   Pulse 68   Resp 16   Ht 1.727 m (5' 8\")   Wt 122 kg (269 lb)   SpO2 96%   BMI 40.90 kg/m   Estimated body mass index is 40.9 kg/m  as calculated from the following:    Height as of this encounter: 1.727 m (5' 8\").    Weight as of this encounter: 122 kg (269 lb).    Medication Reconciliation: complete      DME: GAUDENCIO Dejesus      "

## 2025-03-26 NOTE — ASSESSMENT & PLAN NOTE
Sharad Yi has Severe Sleep apnea. He is tolerating PAP well and reports adequate compliance with PAP therapy. Daytime symptoms are improved and reports positive benefits with PAP use.   ANJU is adequately controlled with Auto CPAP at the current settings per compliance DL.   Prescription provided for renewal of PAP supplies.  Recommended him/her to continue using the CPAP regularly during sleep and instructed  to get  the supplies for the PAP replaced regularly.    Patient instructed to remember to bring PAP with him/her if hospitalized and if anticipating procedure that requires sedation/surgery to be sure to discuss with the provider/surgeon that he/she has sleep apnea and uses PAP therapy.

## 2025-03-26 NOTE — PROGRESS NOTES
[facility]     Sleep clinic follow up visit note    Date on this visit: 3/26/2025    Primary Physician: Leonardo Perez     History of present illness:  Sharad Yi is a 75 year old male patient with HTN, HLD, B/L PE on AC, BPH, and obesity  who presents for CPAP Follow Up (Cpap compliance, needs supplies)     He has severe sleep apnea with an AHI of 72, managed with CPAP.     Do you use a CPAP Machine at home: (Patient-Rptd) Yes  DME: [facility],  set up at [place] on January 15, 2025  Overall, on a scale of 0-10 how would you rate your CPAP (0 poor, 10 great): (Patient-Rptd) 6    What type of mask do you use: (Patient-Rptd) Nasal Pillow  Is your mask comfortable: (Patient-Rptd) Yes  If not, why:      Is your mask leaking: (Patient-Rptd) No  If yes, where do you feel it:    How many night per week does the mask leak (0-7):      Do you notice snoring with mask on: (Patient-Rptd) No  Do you notice gasping arousals with mask on: (Patient-Rptd) Yes  Are you having significant oral or nasal dryness: (Patient-Rptd) Yes  Is the pressure setting comfortable: (Patient-Rptd) Yes  If not, why:      What is your typical bedtime: (Patient-Rptd) 10:00 PM  How long does it take you to go to sleep on PAP therapy: (Patient-Rptd) 10 minutes  What time do you typically get out of bed for the day: (Patient-Rptd) 7:00 AM  How many hours on average per night are you using PAP therapy: (Patient-Rptd) 7  How many hours are you sleeping per night: (Patient-Rptd) 8  Do you feel well rested in the morning: (Patient-Rptd) Yes    ResMed   Auto-PAP 7.0 - 17.0 cmH2O 30 day usage data:    96% of days with > 4 hours of use. 0/30 days with no use.   Average use 422 minutes per day.   95%ile Leak 19.82 L/min.   CPAP 95% pressure 12.6 cm.   AHI 1.29 events per hour.          Assessment and Plan:  Problem List Items Addressed This Visit       ANJU on CPAP - Primary     Sharad Yi has Severe Sleep apnea. He is tolerating PAP well  and reports adequate compliance with PAP therapy. Daytime symptoms are improved and reports positive benefits with PAP use.   ANJU is adequately controlled with Auto CPAP at the current settings per compliance DL.   Prescription provided for renewal of PAP supplies.  Recommended him/her to continue using the CPAP regularly during sleep and instructed  to get  the supplies for the PAP replaced regularly.    Patient instructed to remember to bring PAP with him/her if hospitalized and if anticipating procedure that requires sedation/surgery to be sure to discuss with the provider/surgeon that he/she has sleep apnea and uses PAP therapy.          Relevant Orders    Comprehensive DME (Completed)       SCALES:  EPWORTH SLEEPINESS SCALE       3/21/2025     9:46 AM    [place] Sleepiness Scale ( FLAKO Oliveira  [id number]<br>ESS - [place]/English - Final version - 21 Nov 07 - [facility] [place].)   Sitting and reading Moderate chance of dozing   Watching TV Slight chance of dozing   Sitting, inactive in a public place (e.g. a theatre or a meeting) Slight chance of dozing   As a passenger in a car for an hour without a break Would never doze   Lying down to rest in the afternoon when circumstances permit Moderate chance of dozing   Sitting and talking to someone Would never doze   Sitting quietly after a lunch without alcohol Slight chance of dozing   In a car, while stopped for a few minutes in traffic Would never doze   [place] Score (MC) 7   [place] Score (Sleep) 7        Patient-reported       INSOMNIA SEVERITY INDEX (NAKITA)        3/21/2025     9:41 AM   Insomnia Severity Index (NAKITA)   Difficulty falling asleep 1   Difficulty staying asleep 2   Problems waking up too early 0   How SATISFIED/DISSATISFIED are you with your CURRENT sleep pattern? 2   How NOTICEABLE to others do you think your sleep problem is in terms of impairing the quality of your life? 0   How WORRIED/DISTRESSED are you about your current sleep problem? 2   To  what extent do you consider your sleep problem to INTERFERE with your daily functioning (e.g. daytime fatigue, mood, ability to function at work/daily chores, concentration, memory, mood, etc.) CURRENTLY? 1   NAKITA Total Score 8        Patient-reported     Guidelines for Scoring/Interpretation:  Total score categories:  0-7 = No clinically significant insomnia   8-14 = Subthreshold insomnia   15-21 = Clinical insomnia (moderate severity)  22-28 = Clinical insomnia (severe)  Used via courtesy of www.Ginxealth.va.gov with permission from Garo Kaur PhD., [facility]      Allergies:    No Known Allergies    Medications:    Current Outpatient Medications   Medication Sig Dispense Refill    apixaban ANTICOAGULANT (ELIQUIS) 5 MG tablet Take 1 tablet (5 mg) by mouth 2 times daily. 90 tablet 3    atorvastatin (LIPITOR) 80 MG tablet Take 1 tablet (80 mg) by mouth at bedtime. Increase dose since LDL is more elevated this year. Recheck in one year 90 tablet 3    Cholecalciferol (VITAMIN D3 PO) Take 400 Units by mouth daily.      ferrous sulfate (FEROSUL) 325 (65 Fe) MG tablet Take 1 tablet (325 mg) by mouth daily (with breakfast). 90 tablet 0    metoprolol succinate ER (TOPROL XL) 50 MG 24 hr tablet Take 1 tablet (50 mg) by mouth at bedtime. 90 tablet 3    omeprazole (PRILOSEC) 20 MG DR capsule Take by mouth.      omeprazole (PRILOSEC) 20 MG DR capsule Take 1 capsule (20 mg) by mouth 2 times daily. 180 capsule 0       Problem List:  Patient Active Problem List    Diagnosis Date Noted    BPH (benign prostatic hyperplasia) 09/16/2024     Priority: Medium    ANJU on CPAP 04/08/2024     Priority: Medium    History of pulmonary embolism 12/07/2023     Priority: Medium    Family history of prostate cancer in father 12/07/2023     Priority: Medium    Enlarged prostate 12/07/2023     Priority: Medium    History of colonic polyps 10/20/2023     Priority: Medium    Gallstones 07/08/2023     Priority: Medium    Acute pulmonary  embolism (H) 07/08/2023     Priority: Medium     Diagnosed July 2023      Morbid obesity (H)      Priority: Medium     Formatting of this note might be different from the original.  Created by Conversion      Nonalcoholic fatty liver 09/18/2019     Priority: Medium    Hemorrhoids 04/25/2019     Priority: Medium    Hyperlipidemia      Priority: Medium     Created by Conversion      Formatting of this note might be different from the original.  Created by Conversion      Onychomycosis      Priority: Medium     Created by Conversion      Formatting of this note might be different from the original.  Created by Conversion      Benign essential hypertension      Priority: Medium     Created by Conversion      Formatting of this note might be different from the original.  Created by Conversion      Sebaceous cyst      Priority: Medium     Created by Conversion  [facility] Annotation: Apr 28 2014  9:18AM - Anene, Ositadinma: Inflamed   slightly.      Formatting of this note might be different from the original.  Created by Conversion  [facility] Annotation: Apr 28 2014  9:18AM - Anene, Ositadinma: Inflamed   slightly.      Elevated PSA 03/02/2015     Priority: Medium    Kidney cysts 02/23/2015     Priority: Medium        Past Medical/Surgical History:  Past Medical History:   Diagnosis Date    Arthritis     BPH (benign prostatic hyperplasia)     Chronic knee pain     Hypertension      Past Surgical History:   Procedure Laterality Date    COLONOSCOPY  2016    LASER HOLMIUM ENUCLEATION PROSTATE N/A 9/16/2024    Procedure: ENUCLEATION, PROSTATE, USING HOLMIUM LASER,;  Surgeon: Dasia Kennedy MD;  Location: [facility] OR    MENISCECTOMY Right     ORTHOPEDIC SURGERY  Oct 2019, Nov 2022    [facility] APPENDECTOMY      Description: Appendectomy;  Recorded: 02/05/2013;       Social History:  Social History     Socioeconomic History    Marital status:      Spouse name: Not on file    Number of children: 1    Years of education:  Not on file    Highest education level: Not on file   Occupational History    Occupation: DNR     Comment: Retired   Tobacco Use    Smoking status: Former     Current packs/day: 0.00     Average packs/day: 0.5 packs/day for 2.2 years (1.1 ttl pk-yrs)     Types: Cigarettes     Start date: 1973     Quit date: 1975     Years since quittin.7     Passive exposure: Past    Smokeless tobacco: Never    Tobacco comments:     In college   Vaping Use    Vaping status: Never Used   Substance and Sexual Activity    Alcohol use: Yes     Alcohol/week: 10.0 standard drinks of alcohol     Types: 10 Standard drinks or equivalent per week     Comment: not everyday, occassional    Drug use: No    Sexual activity: Not Currently     Partners: Female     Birth control/protection: None   Other Topics Concern    Parent/sibling w/ CABG, MI or angioplasty before 65F 55M? Yes     Comment: Father   Social History Narrative    Not on file     Social Drivers of Health     Financial Resource Strain: Low Risk  (2024)    Financial Resource Strain     Within the past 12 months, have you or your family members you live with been unable to get utilities (heat, electricity) when it was really needed?: No   Food Insecurity: No Food Insecurity (2025)    Received from [facility]    Hunger Vital Sign     Worried About Running Out of Food in the Last Year: Never true     Ran Out of Food in the Last Year: Never true   Transportation Needs: No Transportation Needs (2025)    Received from [employer]    PRAPARE - Transportation     Lack of Transportation (Medical): No     Lack of Transportation (Non-Medical): No   Physical Activity: Inactive (2024)    Exercise Vital Sign     Days of Exercise per Week: 0 days     Minutes of Exercise per Session: 0 min   Stress: No Stress Concern Present (2024)    [employer] [place] [employer] - Occupational Stress Questionnaire     Feeling of Stress : Not at all  "  Social Connections: Unknown (12/12/2024)    Social Connection and Isolation Panel [NHANES]     Frequency of Communication with Friends and Family: Not on file     Frequency of Social Gatherings with Friends and Family: More than three times a week     Attends Mandaeism Services: Not on file     Active Member of Clubs or Organizations: Not on file     Attends Club or Organization Meetings: Not on file     Marital Status: Not on file   Interpersonal Safety: Not At Risk (2/13/2025)    Received from [employer]    Humiliation, Afraid, Rape, and Kick questionnaire     Fear of Current or Ex-Partner: No     Emotionally Abused: No     Physically Abused: No     Sexually Abused: No   Housing Stability: Unknown (2/13/2025)    Received from [employer]    Housing Stability Vital Sign     Unable to Pay for Housing in the Last Year: No     Number of Times Moved in the Last Year: Not on file     Homeless in the Last Year: No       Family History:  Family History   Problem Relation Age of Onset    No Known Problems Mother         pacemaker    Prostate Cancer Father 57    Heart Disease Father 57    Diabetes Father     Deep Vein Thrombosis (DVT) Sister     No Known Problems Sister     No Known Problems Sister     Hyperlipidemia Brother     No Known Problems Brother     No Known Problems Brother     No Known Problems Brother        Review of systems  A complete review of systems reviewed by me is negative with the exeption of what has been mentioned in the history of present illness.    Physical Examination:  Vitals: BP (!) 147/87   Pulse 68   Resp 16   Ht 1.727 m (5' 8\")   Wt 122 kg (269 lb)   SpO2 96%   BMI 40.90 kg/m    BMI= Body mass index is 40.9 kg/m .     GENERAL: alert, no distress, obese, and elderly  EYES: Eyes grossly normal to inspection.  No discharge or erythema, or obvious scleral/conjunctival abnormalities.  RESP: No audible wheeze, cough, or visible cyanosis.    SKIN: Visible skin clear. No significant " rash, abnormal pigmentation or lesions.  NEURO: Cranial nerves grossly intact.  Mentation and speech appropriate for age.  PSYCH: Appropriate affect, tone, and pace of words          Other tests/labs:   I have reviewed the labs and personally reviewed the imaging below and made my comment in the assessment and plan.        Patient was strongly advised to avoid driving, operating any heavy machinery or other hazardous situations while drowsy or sleepy.  Patient was counseled on the importance of driving while alert, to pull over if drowsy, or nap before getting into the vehicle if sleepy.      Plan is for Sharad Yi to follow up in about 12 month(s).     The above note was dictated using voice recognition software. Although reviewed after completion, some word and grammatical error may remain . Please contact the author for any clarifications.    Total time spent reviewing medical records, history and physical examination, review of previous testing and interpretation as well as documentation on this date, 03/26/25: 11 minutes    Vincent Culver MD on 03/26/25  [facility]   Floor 1, [address]   [address] [place], [place] [address]. [place] [address]   Appointments: [phone]     CC: Vincent Culver

## 2025-03-30 DIAGNOSIS — E78.5 HYPERLIPIDEMIA, UNSPECIFIED HYPERLIPIDEMIA TYPE: ICD-10-CM

## 2025-03-31 RX ORDER — ATORVASTATIN CALCIUM 80 MG/1
TABLET, FILM COATED ORAL
Qty: 90 TABLET | Refills: 0 | Status: SHIPPED | OUTPATIENT
Start: 2025-03-31

## 2025-04-17 ENCOUNTER — TRANSFERRED RECORDS (OUTPATIENT)
Dept: HEALTH INFORMATION MANAGEMENT | Facility: CLINIC | Age: 75
End: 2025-04-17
Payer: COMMERCIAL

## 2025-05-23 ENCOUNTER — TRANSFERRED RECORDS (OUTPATIENT)
Dept: HEALTH INFORMATION MANAGEMENT | Facility: CLINIC | Age: 75
End: 2025-05-23
Payer: COMMERCIAL

## 2025-06-10 DIAGNOSIS — Z86.711 HISTORY OF PULMONARY EMBOLISM: ICD-10-CM

## 2025-06-10 NOTE — TELEPHONE ENCOUNTER
Refill Request  Medication name: Pending Prescriptions:                       Disp   Refills    apixaban ANTICOAGULANT (ELIQUIS) 5 MG tab*90 tab*3            Sig: Take 1 tablet (5 mg) by mouth 2 times daily.    Requested Pharmacy:  [id number] [place] [id number] [facility]  AT [facility]

## 2025-07-29 ENCOUNTER — OFFICE VISIT (OUTPATIENT)
Dept: UROLOGY | Facility: CLINIC | Age: 75
End: 2025-07-29
Payer: COMMERCIAL

## 2025-07-29 VITALS
OXYGEN SATURATION: 98 % | WEIGHT: 269 LBS | DIASTOLIC BLOOD PRESSURE: 98 MMHG | BODY MASS INDEX: 40.77 KG/M2 | SYSTOLIC BLOOD PRESSURE: 171 MMHG | HEIGHT: 68 IN | HEART RATE: 80 BPM

## 2025-07-29 DIAGNOSIS — R39.12 POOR URINARY STREAM: ICD-10-CM

## 2025-07-29 DIAGNOSIS — N21.0 BLADDER STONE: ICD-10-CM

## 2025-07-29 DIAGNOSIS — R39.14 BENIGN PROSTATIC HYPERPLASIA WITH INCOMPLETE BLADDER EMPTYING: Primary | ICD-10-CM

## 2025-07-29 DIAGNOSIS — N40.1 BENIGN PROSTATIC HYPERPLASIA WITH INCOMPLETE BLADDER EMPTYING: Primary | ICD-10-CM

## 2025-07-29 PROCEDURE — 3077F SYST BP >= 140 MM HG: CPT | Performed by: STUDENT IN AN ORGANIZED HEALTH CARE EDUCATION/TRAINING PROGRAM

## 2025-07-29 PROCEDURE — 99207 PR DROP WITH A PROCEDURE: CPT | Mod: 25 | Performed by: STUDENT IN AN ORGANIZED HEALTH CARE EDUCATION/TRAINING PROGRAM

## 2025-07-29 PROCEDURE — 3080F DIAST BP >= 90 MM HG: CPT | Performed by: STUDENT IN AN ORGANIZED HEALTH CARE EDUCATION/TRAINING PROGRAM

## 2025-07-29 PROCEDURE — 51741 ELECTRO-UROFLOWMETRY FIRST: CPT | Performed by: STUDENT IN AN ORGANIZED HEALTH CARE EDUCATION/TRAINING PROGRAM

## 2025-07-29 NOTE — PROGRESS NOTES
Reason for cystoscopy: weak stream    Brief History:  73 yo male  BPH, chronic knee pain, bilateral pulmonary embolism and DVT managed with apixaban 5 mg twice a day     4/2024 saw he had bladder stone and 74 gram prostate    9/2024 holep 55 grams removed    10/2024   Pt is urinating every 2 hours and sometimes when away from home.   Pt states he has a burning pain at the tip of the penis - 60% of the time.   Pt reports he has a sensation of incomplete emptying at times when voiding. Dribbling after rising from toilet.    1/2025  urine stream seems great when he is sitting down but seems weaker when he is standing up. He does report that it has been improving. He has not had any more leakage. He does not use any pads. His overactive bladder is also resolved he is not using any oxybutynin.     7/2025  He continues to report a weak stream when standing. We discussed scheduling a cystoscopy to ensure he did not develop a stricture with the HoLEP. He is agreeable.       CYSTOSCOPY  We discussed the risks and benefits of the procedure which include risk of bleeding and infection.  Informed consent was obtained, the patient was prepped and draped in the standard sterile fashion.  A flexible cystoscope was introduced through a well-lubricated urethra.     Anterior urethra strictures were absent.   The urinary sphincter was intact.  The prostate had a stone in the fossa.          Assessment and Plan    Patient with stone in the prostatic fossa after holep  Plan for cystolithopaxy in the OR  Will be seeing his PCP Thursday  Dasia Kennedy MD

## 2025-07-29 NOTE — NURSING NOTE
Active order to obtain bladder scan? Yes   Name of ordering provider:  gansan  Bladder scan preformed post void Yes: .  Bladder scan reveled 10 ML  Provider notified?  Yes  Uroflow completed   Apple Cristobal MA

## 2025-07-31 ENCOUNTER — OFFICE VISIT (OUTPATIENT)
Dept: FAMILY MEDICINE | Facility: CLINIC | Age: 75
End: 2025-07-31
Payer: COMMERCIAL

## 2025-07-31 ENCOUNTER — TELEPHONE (OUTPATIENT)
Dept: UROLOGY | Facility: CLINIC | Age: 75
End: 2025-07-31

## 2025-07-31 VITALS
SYSTOLIC BLOOD PRESSURE: 138 MMHG | BODY MASS INDEX: 40.42 KG/M2 | RESPIRATION RATE: 18 BRPM | HEART RATE: 71 BPM | OXYGEN SATURATION: 96 % | DIASTOLIC BLOOD PRESSURE: 88 MMHG | WEIGHT: 266.7 LBS | TEMPERATURE: 97.8 F | HEIGHT: 68 IN

## 2025-07-31 DIAGNOSIS — R39.14 BENIGN PROSTATIC HYPERPLASIA WITH INCOMPLETE BLADDER EMPTYING: ICD-10-CM

## 2025-07-31 DIAGNOSIS — G47.33 OSA (OBSTRUCTIVE SLEEP APNEA): ICD-10-CM

## 2025-07-31 DIAGNOSIS — E78.5 HYPERLIPIDEMIA, UNSPECIFIED HYPERLIPIDEMIA TYPE: ICD-10-CM

## 2025-07-31 DIAGNOSIS — N40.1 BENIGN PROSTATIC HYPERPLASIA WITH INCOMPLETE BLADDER EMPTYING: ICD-10-CM

## 2025-07-31 DIAGNOSIS — L30.8 OTHER ECZEMA: ICD-10-CM

## 2025-07-31 DIAGNOSIS — N21.0 BLADDER STONE: ICD-10-CM

## 2025-07-31 DIAGNOSIS — M65.30 TRIGGER FINGER, ACQUIRED: ICD-10-CM

## 2025-07-31 DIAGNOSIS — E55.9 VITAMIN D DEFICIENCY: Primary | ICD-10-CM

## 2025-07-31 DIAGNOSIS — K29.61 EROSIVE GASTRITIS WITH HEMORRHAGE: ICD-10-CM

## 2025-07-31 DIAGNOSIS — Z01.818 PREOP GENERAL PHYSICAL EXAM: ICD-10-CM

## 2025-07-31 DIAGNOSIS — E11.9 TYPE 2 DIABETES MELLITUS WITHOUT COMPLICATION, WITHOUT LONG-TERM CURRENT USE OF INSULIN (H): ICD-10-CM

## 2025-07-31 DIAGNOSIS — N21.8 CALCULUS OF OTHER LOWER URINARY TRACT LOCATION: ICD-10-CM

## 2025-07-31 DIAGNOSIS — T39.395A NSAID INDUCED GASTRITIS: ICD-10-CM

## 2025-07-31 DIAGNOSIS — D50.0 IRON DEFICIENCY ANEMIA DUE TO CHRONIC BLOOD LOSS: ICD-10-CM

## 2025-07-31 DIAGNOSIS — Z86.711 HISTORY OF PULMONARY EMBOLISM: ICD-10-CM

## 2025-07-31 DIAGNOSIS — R53.83 OTHER FATIGUE: ICD-10-CM

## 2025-07-31 DIAGNOSIS — R25.2 LEG CRAMPS: ICD-10-CM

## 2025-07-31 DIAGNOSIS — K29.60 NSAID INDUCED GASTRITIS: ICD-10-CM

## 2025-07-31 DIAGNOSIS — Z12.5 ENCOUNTER FOR SCREENING FOR MALIGNANT NEOPLASM OF PROSTATE: ICD-10-CM

## 2025-07-31 LAB
ALBUMIN SERPL BCG-MCNC: 4 G/DL (ref 3.5–5.2)
ALP SERPL-CCNC: 64 U/L (ref 40–150)
ALT SERPL W P-5'-P-CCNC: 27 U/L (ref 0–70)
ANION GAP SERPL CALCULATED.3IONS-SCNC: 9 MMOL/L (ref 7–15)
AST SERPL W P-5'-P-CCNC: 27 U/L (ref 0–45)
ATRIAL RATE - MUSE: 68 BPM
BILIRUB SERPL-MCNC: 0.7 MG/DL
BUN SERPL-MCNC: 16.9 MG/DL (ref 8–23)
CALCIUM SERPL-MCNC: 9.9 MG/DL (ref 8.8–10.4)
CHLORIDE SERPL-SCNC: 107 MMOL/L (ref 98–107)
CHOLEST SERPL-MCNC: 181 MG/DL
CREAT SERPL-MCNC: 1.04 MG/DL (ref 0.67–1.17)
DIASTOLIC BLOOD PRESSURE - MUSE: NORMAL MMHG
EGFRCR SERPLBLD CKD-EPI 2021: 74 ML/MIN/1.73M2
ERYTHROCYTE [DISTWIDTH] IN BLOOD BY AUTOMATED COUNT: 15.5 % (ref 10–15)
EST. AVERAGE GLUCOSE BLD GHB EST-MCNC: 140 MG/DL
FASTING STATUS PATIENT QL REPORTED: YES
FASTING STATUS PATIENT QL REPORTED: YES
FERRITIN SERPL-MCNC: 96 NG/ML (ref 31–409)
GLUCOSE SERPL-MCNC: 127 MG/DL (ref 70–99)
HBA1C MFR BLD: 6.5 % (ref 0–5.6)
HCO3 SERPL-SCNC: 25 MMOL/L (ref 22–29)
HCT VFR BLD AUTO: 40.5 % (ref 40–53)
HDLC SERPL-MCNC: 46 MG/DL
HGB BLD-MCNC: 13.6 G/DL (ref 13.3–17.7)
INTERPRETATION ECG - MUSE: NORMAL
LDLC SERPL CALC-MCNC: 111 MG/DL
MAGNESIUM SERPL-MCNC: 1.9 MG/DL (ref 1.7–2.3)
MAGNESIUM SERPL-MCNC: 1.9 MG/DL (ref 1.7–2.3)
MCH RBC QN AUTO: 31 PG (ref 26.5–33)
MCHC RBC AUTO-ENTMCNC: 33.6 G/DL (ref 31.5–36.5)
MCV RBC AUTO: 92 FL (ref 78–100)
NONHDLC SERPL-MCNC: 135 MG/DL
P AXIS - MUSE: 48 DEGREES
PLATELET # BLD AUTO: 187 10E3/UL (ref 150–450)
POTASSIUM SERPL-SCNC: 4.7 MMOL/L (ref 3.4–5.3)
PR INTERVAL - MUSE: 170 MS
PROT SERPL-MCNC: 6.9 G/DL (ref 6.4–8.3)
PSA SERPL DL<=0.01 NG/ML-MCNC: 0.68 NG/ML (ref 0–6.5)
QRS DURATION - MUSE: 134 MS
QT - MUSE: 426 MS
QTC - MUSE: 452 MS
R AXIS - MUSE: -31 DEGREES
RBC # BLD AUTO: 4.39 10E6/UL (ref 4.4–5.9)
SODIUM SERPL-SCNC: 141 MMOL/L (ref 135–145)
SYSTOLIC BLOOD PRESSURE - MUSE: NORMAL MMHG
T AXIS - MUSE: -15 DEGREES
TRIGL SERPL-MCNC: 121 MG/DL
TSH SERPL DL<=0.005 MIU/L-ACNC: 1.29 UIU/ML (ref 0.3–4.2)
VENTRICULAR RATE- MUSE: 68 BPM
VIT D+METAB SERPL-MCNC: 62 NG/ML (ref 20–50)
WBC # BLD AUTO: 6.1 10E3/UL (ref 4–11)

## 2025-07-31 RX ORDER — ATORVASTATIN CALCIUM 80 MG/1
80 TABLET, FILM COATED ORAL DAILY
Qty: 90 TABLET | Refills: 3 | Status: SHIPPED | OUTPATIENT
Start: 2025-07-31

## 2025-07-31 RX ORDER — OMEPRAZOLE 20 MG/1
20 CAPSULE, DELAYED RELEASE ORAL DAILY
Qty: 90 CAPSULE | Refills: 3 | Status: SHIPPED | OUTPATIENT
Start: 2025-07-31

## 2025-07-31 NOTE — PROGRESS NOTES
Preoperative Evaluation  [facility]  [address] [place] [address]  Phone: [phone]  Fax: [phone]  Primary Provider: Leonardo Perez MD  Pre-op Performing Provider: Leonardo Perez MD  Jul 31, 2025   {Provider  Link to PREOP SmartSet  REQUIRED to apply standard patient instructions and medication directions to the AVS :040235}  {ROOMER review and update patient entered surgical information if needed :744512}  { After Pre-op is completed, use lists to pull documentation into note Link to complete Pre-Op    :306435}  {Pull Surgical Information into note after flowsheet completed:090048}  Fax number for surgical facility: {:313152}    {Provider Charting Preference for Preop :034894}    Enzo Quezada is a 75 year old, presenting for the following:  Pre-Op Exam (Having surgery on 08/14/2025)          7/31/2025     9:15 AM   Additional Questions   Roomed by debbie solitario   Accompanied by self     HPI: ***    {Pull Pre-Op Questionnaire into note after flowsheet completed:366347}  Advance Care Planning  {The storyboard will display whether the patient has ACP docs on file. Hover over the Code section in the storyboard to access the ACP documents. :189400}  {(AWV REQUIRED) Advance Care Planning Reviewed:756316}    Preoperative Review of   {Mnpmpreport:447381}  {Review MNPMP for all patients per [facility]CSI MNPMP Profile:429371}    {Chronic problem details (Optional) :970005}    Patient Active Problem List    Diagnosis Date Noted    BPH (benign prostatic hyperplasia) 09/16/2024     Priority: Medium    ANJU on CPAP 04/08/2024     Priority: Medium    History of pulmonary embolism 12/07/2023     Priority: Medium    Family history of prostate cancer in father 12/07/2023     Priority: Medium    Enlarged prostate 12/07/2023     Priority: Medium    History of colonic polyps 10/20/2023     Priority: Medium    Gallstones 07/08/2023     Priority: Medium    Acute pulmonary  embolism (H) 07/08/2023     Priority: Medium     Diagnosed July 2023      Morbid obesity (H)      Priority: Medium     Formatting of this note might be different from the original.  Created by Conversion      Nonalcoholic fatty liver 09/18/2019     Priority: Medium    Hemorrhoids 04/25/2019     Priority: Medium    Hyperlipidemia      Priority: Medium     Created by Conversion      Formatting of this note might be different from the original.  Created by Conversion      Onychomycosis      Priority: Medium     Created by Conversion      Formatting of this note might be different from the original.  Created by Conversion      Benign essential hypertension      Priority: Medium     Created by Conversion      Formatting of this note might be different from the original.  Created by Conversion      Sebaceous cyst      Priority: Medium     Created by Conversion  [facility] Annotation: Apr 28 2014  9:18AM - Anene, Ositadinma: Inflamed   slightly.      Formatting of this note might be different from the original.  Created by Conversion  [facility] Annotation: Apr 28 2014  9:18AM - Anene, Ositadinma: Inflamed   slightly.      Elevated PSA 03/02/2015     Priority: Medium    Kidney cysts 02/23/2015     Priority: Medium      Past Medical History:   Diagnosis Date    Arthritis     BPH (benign prostatic hyperplasia)     Chronic knee pain     Hypertension      Past Surgical History:   Procedure Laterality Date    COLONOSCOPY  2016    LASER HOLMIUM ENUCLEATION PROSTATE N/A 9/16/2024    Procedure: ENUCLEATION, PROSTATE, USING HOLMIUM LASER,;  Surgeon: Dasia Kennedy MD;  Location: [facility] OR    MENISCECTOMY Right     ORTHOPEDIC SURGERY  Oct 2019, Nov 2022    [facility] APPENDECTOMY      Description: Appendectomy;  Recorded: 02/05/2013;     Current Outpatient Medications   Medication Sig Dispense Refill    apixaban ANTICOAGULANT (ELIQUIS) 5 MG tablet Take 1 tablet (5 mg) by mouth 2 times daily. 180 tablet 1    atorvastatin (LIPITOR) 80  "MG tablet TAKE 1 TABLET(80 MG) BY MOUTH AT BEDTIME. INCREASE DOSE SINCE LDL IS MORE ELEVATED THIS YEAR. RECHECK IN 1 YEAR 90 tablet 0    Cholecalciferol (VITAMIN D3 PO) Take 400 Units by mouth daily.      ferrous sulfate (FEROSUL) 325 (65 Fe) MG tablet Take 1 tablet (325 mg) by mouth daily (with breakfast). 90 tablet 0    metoprolol succinate ER (TOPROL XL) 50 MG 24 hr tablet Take 1 tablet (50 mg) by mouth at bedtime. 90 tablet 3    omeprazole (PRILOSEC) 20 MG DR capsule Take by mouth.      omeprazole (PRILOSEC) 20 MG DR capsule Take 1 capsule (20 mg) by mouth 2 times daily. 180 capsule 0       No Known Allergies     Social History     Tobacco Use    Smoking status: Former     Current packs/day: 0.00     Average packs/day: 0.5 packs/day for 2.2 years (1.1 ttl pk-yrs)     Types: Cigarettes     Start date: 1973     Quit date: 1975     Years since quittin.0     Passive exposure: Past    Smokeless tobacco: Never    Tobacco comments:     In college   Substance Use Topics    Alcohol use: Yes     Alcohol/week: 10.0 standard drinks of alcohol     Types: 10 Standard drinks or equivalent per week     Comment: not everyday, occassional     {FAMILY HISTORY (Optional):556033955}  History   Drug Use No           {ROS Picklists (Optional):846435}    Objective    /88   Pulse 71   Temp 97.8  F (36.6  C) (Oral)   Resp 18   Ht 1.727 m (5' 7.99\")   Wt 121 kg (266 lb 11.2 oz)   SpO2 96%   BMI 40.56 kg/m     Estimated body mass index is 40.56 kg/m  as calculated from the following:    Height as of this encounter: 1.727 m (5' 7.99\").    Weight as of this encounter: 121 kg (266 lb 11.2 oz).  Physical Exam  {Exam List :499515}    Recent Labs   Lab Test 25  0802 25  0956 25  1442 25  1207 25  1010 24  1356   HGB 11.9* 9.9*   < > 10.9*   < >  --     286   < > 219  --   --    NA  --  142  --  140  --   --    POTASSIUM  --  4.1  --  4.2  --   --    CR  --  1.01  --  0.96  -- "   --    A1C  --   --   --   --   --  6.2*    < > = values in this interval not displayed.        Diagnostics  {LABS:198180}   {EK}    Revised Cardiac Risk Index (RCRI)  The patient has the following serious cardiovascular risks for perioperative complications:  {PREOP REVISED CARDIAC RISK INDEX (RCRI) :044237}     RCRI Interpretation: {REVISED CARDIAC RISK INTERPRETATION :660051}         Signed Electronically by: Leonardo Perez MD  A copy of this evaluation report is provided to the requesting physician.    {Provider Resources  Preop [facility] Preop Guidelines  Revised Cardiac Risk Index :091324}   {Email feedback regarding this note to [email or web address]   :624755}

## 2025-07-31 NOTE — TELEPHONE ENCOUNTER
Pt returning care team call from yesterday. Pt states he was calling to scheduled a procedure.      [facility]   [facility] [place]   [facility]  [phone]

## 2025-07-31 NOTE — PROGRESS NOTES
Preoperative Evaluation  [facility]  [address] [address]  Phone: [phone]  Fax: [phone]  Primary Provider: Leonardo Perez MD  Pre-op Performing Provider: Leonardo Perez MD  Jul 31, 2025                  Surgical Information   What procedure is being done? Urine test and pre op visit   Facility or Hospital where procedure/surgery will be performed: [facility]   Who is doing the procedure / surgery? Dasia Kennedy   Date of surgery / procedure: TBD             Fax number for surgical facility: Note does not need to be faxed, will be available electronically in Epic.    Assessment & Plan     The proposed surgical procedure is considered LOW risk.    This note was formulated using [employer] technology and may have unintentional omissions.    Preop general physical exam: Medically optimized for this procedure  - Perform pre-op labs and EKG. Schedule pre-op for knee surgery.    Calculus of other lower urinary tract location:  - Stone found in urinary tract.  - Procedure scheduled to remove stone.    Hyperlipidemia, unspecified hyperlipidemia type:  - Renew atorvastatin prescription.     NSAID induced gastritis:  - Related to ibuprofen use.   - Continue omeprazole 20 mg for 6 to 12 months. Avoid NSAIDs, use Tylenol instead.    Erosive gastritis with hemorrhage: Hospitalized in February of this year for this.  Hemoglobin has normalized no ongoing issue would avoid use of ibuprofen and Aleve type products  - Related to ibuprofen use.  - Continue omeprazole 40 mg for 6 to 12 months. Avoid NSAIDs, use Tylenol instead.    History of pulmonary embolism:  - Hold eliquis 2 days before procedures (what patient reports being told to hold for). Restart post-procedure as advised.    Vitamin D deficiency:  - Check vitamin D levels.    Iron deficiency anemia due to chronic blood loss:  Hemoglobin   Date Value Ref Range Status   07/31/2025 13.6 13.3  "- 17.7 g/dL Final   03/20/2025 11.9 (L) 13.3 - 17.7 g/dL Final   - Check ferritin levels to determine need for continued iron supplementation.      Other eczema:  - Continue using betamethasone cream as needed.    - Other fatigue:  - Check thyroid function.     ANJU (obstructive sleep apnea):  - Continue using CPAP machine.    New onset type 2 diabetes. Noted on labs today. No need for intervention before procedure Discussed GLP-1 therapy patient's can think about this and let me know if he would like to start this.  - Check A1c levels.    Leg cramps:  - Check magnesium levels.  Checking ferritin and thyroid function as well.     Trigger finger, acquired:  - Monitor condition; consider referral to hand surgeon if worsens.    Consent was obtained from the patient to use an AI documentation tool in the creation of this note.                  Recommendation  Approval given to proceed with proposed procedure, without further diagnostic evaluation.        Enzo Quezada is a 75 year old, presenting for the following:  Pre-Op Exam (Having surgery on 08/14/2025)          7/31/2025     9:15 AM   Additional Questions   Roomed by debbie solitario   Accompanied by self     HPI:  Damien Yi, 75 years    Urinary symptoms and urologic stone  - Reported trouble urinating while standing; urine does not come out when standing, but urination is fine when sitting  - Underwent evaluation on Tuesday prior to the visit due to these symptoms  - Urologist identified a stone and plans to \"blast that out\"  - No blood in the urine reported  - No issues with anesthesia in the past  - No known bleeding or clotting issues    Erosive gastritis  - History of erosive gastritis since February 2025, believed to be related to ibuprofen use  - Stomach symptoms have been \"doing OK\" since then  - Has been avoiding ibuprofen and using only Tylenol for pain  - Currently taking omeprazole (Prilosec) for stomach acid  - Currently taking an iron supplement    Leg " "cramps and joint symptoms  - Reports leg cramps at night when trying to go to bed; no cramping while walking  - Noted that every so often, joints \"freeze up\" or \"lock,\" especially the pointer finger, over the last couple of months  - No referral requested for hand symptoms    Energy and activity  - Reports energy is \"OK\"  - Decreased ability to walk two miles 3-4 times a day due to knee issues    Sleep apnea  - Recently started using a sleep apnea machine (under the nose type)  - Reports dry throat as a side effect    Blood pressure  - Noted blood pressure was 171 on Tuesday prior to the visit, but 139 on the day of the visit  - Does not use salt on food    Family history  - Sister is taking a GLP-1 agonist medication for weight loss; sister does not have diabetes    Misc  - Reports a blood spot on the skin a couple of weeks ago, which has since resolved  - No history of pancreatitis reported  - No family history of medullary thyroid cancer reported  - No history of personal reaction to anesthesia reported               Pre-Op Questionnaire   Have you ever had a heart attack or stroke? No   Have you ever had surgery on your heart or blood vessels, such as a stent placement, a coronary artery bypass, or surgery on an artery in your head, neck, heart, or legs? No   Do you have chest pain with activity? No   Do you have a history of heart failure? No   Do you currently have a cold, bronchitis or symptoms of other infection? No   Do you have a cough, shortness of breath, or wheezing? No   Do you or anyone in your family have previous history of blood clots? (!) YES history PULMONARY EMBOLISM    Do you or does anyone in your family have a serious bleeding problem such as prolonged bleeding following surgeries or cuts? No   Have you ever had problems with anemia or been told to take iron pills? No   Have you had any abnormal blood loss such as black, tarry or bloody stools? No   Have you ever had a blood transfusion? No "   Are you willing to have a blood transfusion if it is medically needed before, during, or after your surgery? Yes   Have you or any of your relatives ever had problems with anesthesia? No   Do you have sleep apnea, excessive snoring or daytime drowsiness? (!) YES   Do you have a CPAP machine? (!) NO    Do you have any artifical heart valves or other implanted medical devices like a pacemaker, defibrillator, or continuous glucose monitor? No   Do you have artificial joints? (!) YES   Are you allergic to latex? No     Advance Care Planning    Discussed advance care planning with patient; informed AVS has link to [employer].    Preoperative Review of    reviewed - no record of controlled substances prescribed.          Patient Active Problem List    Diagnosis Date Noted    Iron deficiency anemia due to chronic blood loss 07/31/2025     Priority: Medium    BPH (benign prostatic hyperplasia) 09/16/2024     Priority: Medium    ANJU on CPAP 04/08/2024     Priority: Medium    History of pulmonary embolism 12/07/2023     Priority: Medium    Family history of prostate cancer in father 12/07/2023     Priority: Medium    Enlarged prostate 12/07/2023     Priority: Medium    History of colonic polyps 10/20/2023     Priority: Medium    Gallstones 07/08/2023     Priority: Medium    Acute pulmonary embolism (H) 07/08/2023     Priority: Medium     Diagnosed July 2023      Morbid obesity (H)      Priority: Medium     Formatting of this note might be different from the original.  Created by Conversion      Nonalcoholic fatty liver 09/18/2019     Priority: Medium    Hemorrhoids 04/25/2019     Priority: Medium    Hyperlipidemia      Priority: Medium     Created by Conversion      Formatting of this note might be different from the original.  Created by Conversion      Onychomycosis      Priority: Medium     Created by Conversion      Formatting of this note might be different from the original.  Created by Conversion       Benign essential hypertension      Priority: Medium     Created by Conversion      Formatting of this note might be different from the original.  Created by Conversion      Sebaceous cyst      Priority: Medium     Created by Conversion  [facility] Annotation: Apr 28 2014  9:18AM Clay Pittmanma: Inflamed   slightly.      Formatting of this note might be different from the original.  Created by Conversion  [facility] Annotation: Apr 28 2014  9:18AM Clay Pittmanma: Inflamed   slightly.      Elevated PSA 03/02/2015     Priority: Medium    Kidney cysts 02/23/2015     Priority: Medium      Past Medical History:   Diagnosis Date    Arthritis     BPH (benign prostatic hyperplasia)     Chronic knee pain     Hypertension      Past Surgical History:   Procedure Laterality Date    COLONOSCOPY  2016    LASER HOLMIUM ENUCLEATION PROSTATE N/A 9/16/2024    Procedure: ENUCLEATION, PROSTATE, USING HOLMIUM LASER,;  Surgeon: Dasia Kennedy MD;  Location: [facility] OR    MENISCECTOMY Right     ORTHOPEDIC SURGERY  Oct 2019, Nov 2022    [facility] APPENDECTOMY      Description: Appendectomy;  Recorded: 02/05/2013;     Current Outpatient Medications   Medication Sig Dispense Refill    apixaban ANTICOAGULANT (ELIQUIS) 5 MG tablet Take 1 tablet (5 mg) by mouth 2 times daily. 180 tablet 1    atorvastatin (LIPITOR) 80 MG tablet Take 1 tablet (80 mg) by mouth daily. 90 tablet 3    betamethasone valerate (VALISONE) 0.1 % external ointment Apply topically 2 times daily as needed (irritation). Apply to affected areas of the skin such as arms twice per day as needed 45 g 1    Cholecalciferol (VITAMIN D3 PO) Take 400 Units by mouth daily.      ferrous sulfate (FEROSUL) 325 (65 Fe) MG tablet Take 1 tablet (325 mg) by mouth daily (with breakfast). 90 tablet 0    metoprolol succinate ER (TOPROL XL) 50 MG 24 hr tablet Take 1 tablet (50 mg) by mouth at bedtime. 90 tablet 3    omeprazole (PRILOSEC) 20 MG DR capsule Take 1 capsule (20 mg) by mouth  "daily. 90 capsule 3       No Known Allergies     Social History     Tobacco Use    Smoking status: Former     Current packs/day: 0.00     Average packs/day: 0.5 packs/day for 2.2 years (1.1 ttl pk-yrs)     Types: Cigarettes     Start date: 1973     Quit date: 1975     Years since quittin.0     Passive exposure: Past    Smokeless tobacco: Never    Tobacco comments:     In college   Substance Use Topics    Alcohol use: Yes     Alcohol/week: 10.0 standard drinks of alcohol     Types: 10 Standard drinks or equivalent per week     Comment: not everyday, occassional       History   Drug Use No             Review of Systems  Constitutional, HEENT, cardiovascular, pulmonary, gi and gu systems are negative, except as otherwise noted.    Objective    /88   Pulse 71   Temp 97.8  F (36.6  C) (Oral)   Resp 18   Ht 1.727 m (5' 7.99\")   Wt 121 kg (266 lb 11.2 oz)   SpO2 96%   BMI 40.56 kg/m     Estimated body mass index is 40.56 kg/m  as calculated from the following:    Height as of this encounter: 1.727 m (5' 7.99\").    Weight as of this encounter: 121 kg (266 lb 11.2 oz).  Physical Exam  GENERAL: alert and no distress  HENT: ear canals and TM's normal, nose and mouth without ulcers or lesions  NECK: no adenopathy, no asymmetry, masses, or scars  RESP: lungs clear to auscultation - no rales, rhonchi or wheezes  CV: regular rate and rhythm, normal S1 S2, no S3 or S4, no murmur, click or rub, no peripheral edema  ABDOMEN: soft, nontender, no hepatosplenomegaly, no masses and bowel sounds normal  MS: no gross musculoskeletal defects noted, no edema  PSYCH: mentation appears normal, affect normal/bright    Recent Labs   Lab Test 25  0802 25  0956 25  1442 25  1207 25  1010 24  1356   HGB 11.9* 9.9*   < > 10.9*   < >  --     286   < > 219  --   --    NA  --  142  --  140  --   --    POTASSIUM  --  4.1  --  4.2  --   --    CR  --  1.01  --  0.96  --   --    A1C  -- "   --   --   --   --  6.2*    < > = values in this interval not displayed.        Diagnostics  Recent Results (from the past 24 hours)   EKG 12-lead, tracing only    Collection Time: 07/31/25 10:14 AM   Result Value Ref Range    Systolic Blood Pressure  mmHg    Diastolic Blood Pressure  mmHg    Ventricular Rate 68 BPM    Atrial Rate 68 BPM    SC Interval 170 ms    QRS Duration 134 ms     ms    QTc 452 ms    P Axis 48 degrees    R AXIS -31 degrees    T Axis -15 degrees    Interpretation ECG       Sinus rhythm  Left axis deviation  Right bundle branch block  Abnormal ECG  When compared with ECG of 11-Feb-2025 12:09,  Vent. rate has decreased by  34 bpm  Left anterior fascicular block is no longer Present  Inverted T waves have replaced nonspecific T wave abnormality in Inferior leads     CBC with platelets    Collection Time: 07/31/25 10:26 AM   Result Value Ref Range    WBC Count 6.1 4.0 - 11.0 10e3/uL    RBC Count 4.39 (L) 4.40 - 5.90 10e6/uL    Hemoglobin 13.6 13.3 - 17.7 g/dL    Hematocrit 40.5 40.0 - 53.0 %    MCV 92 78 - 100 fL    MCH 31.0 26.5 - 33.0 pg    MCHC 33.6 31.5 - 36.5 g/dL    RDW 15.5 (H) 10.0 - 15.0 %    Platelet Count 187 150 - 450 10e3/uL   Hemoglobin A1c    Collection Time: 07/31/25 10:26 AM   Result Value Ref Range    Estimated Average Glucose 140 (H) <117 mg/dL    Hemoglobin A1C 6.5 (H) 0.0 - 5.6 %      EKG- no acute changes    Revised Cardiac Risk Index (RCRI)  The patient has the following serious cardiovascular risks for perioperative complications:   - No serious cardiac risks = 0 points     RCRI Interpretation: 0 points: Class I (very low risk - 0.4% complication rate)         Signed Electronically by: Leonardo Perez MD  A copy of this evaluation report is provided to the requesting physician.         Answers submitted by the patient for this visit:  Lipid Visit (Submitted on 7/31/2025)  Chief Complaint: Chronic problems general questions HPI Form  Are you regularly taking any  medication or supplement to lower your cholesterol?: Yes  Are you having muscle aches or other side effects that you think could be caused by your cholesterol lowering medication?: No  Hypertension Visit (Submitted on 7/31/2025)  Chief Complaint: Chronic problems general questions HPI Form  Do you check your blood pressure regularly outside of the clinic?: No  Are your blood pressures ever more than 140 on the top number (systolic) OR more than 90 on the bottom number (diastolic)? (For example, greater than 140/90): Yes  Are you following a low salt diet?: No  General Questionnaire (Submitted on 7/31/2025)  Chief Complaint: Chronic problems general questions HPI Form  How many servings of fruits and vegetables do you eat daily?: 0-1  On average, how many sweetened beverages do you drink each day (Examples: soda, juice, sweet tea, etc.  Do NOT count diet or artificially sweetened beverages)?: 0  How many minutes a day do you exercise enough to make your heart beat faster?: 9 or less  How many days a week do you exercise enough to make your heart beat faster?: 3 or less  How many days per week do you miss taking your medication?: 0  Questionnaire about: Chronic problems general questions HPI Form (Submitted on 7/31/2025)  Chief Complaint: Chronic problems general questions HPI Form    The longitudinal plan of care for the diagnosis(es)/condition(s) as documented were addressed during this visit. Due to the added complexity in care, I will continue to support Damien in the subsequent management and with ongoing continuity of care.

## 2025-07-31 NOTE — PATIENT INSTRUCTIONS
Hold the eliquis for 2 days before the procedure or based on what they tell you from a preop standpoint.    Labs today as we discussed.     No aspirin or ibuprofen for 7 days before the procedure. Should continue to avoid ibuprofen, aleve etc.   - tylenol is fine until the procedure.     Can consider medication such as GLP1 therapy in the future- ozempic, wegovy (semaglutide), zepbound, mounjaro are names for these    I would like you blood pressure to be less than 140/90 at home. Can consider getting a home cuff if you do not have already- get the type that is on the arm not the wrist as it is more accurate on the arm- Omron is a good brand to get. If in doubt about how accurate your home cuff is, you can make a nurse only appointment to come in for recheck.    Recheck with preop in October for the knees. Let me know if issues before then.    Leonardo Perez MD          Patient Education   Preparing for Your Surgery  For Adults  Getting started  In most cases, a nurse will call to review your health history and instructions. They will give you an arrival time based on your scheduled surgery time. Please be ready to share:  Your doctor's clinic name and phone number  Your medical, surgical, and anesthesia history  A list of allergies and sensitivities  A list of medicines, including herbal treatments and over-the-counter drugs  Whether the patient has a legal guardian (ask how to send us the papers in advance)  Note: You may not receive a call if you were seen at our PAC (Pre[facility]).  Please tell us if you're pregnant--or if there's any chance you might be pregnant. Some surgeries may injure a fetus (unborn baby), so they require a pregnancy test. Surgeries that are safe for a fetus don't always need a test, and you can choose whether to have one.   Preparing for surgery  Within 10 to 30 days of surgery: Have a pre-op exam (sometimes called an H&P, or History and Physical). This can be done at a  clinic or pre-operative center.  If you're having a , you may not need this exam. Talk to your care team.  At your pre-op exam, talk to your care team about all medicines you take. (This includes CBD oil and any drugs, such as THC, marijuana, and other forms of cannabis.) If you need to stop any medicine before surgery, ask when to start taking it again.  This is for your safety. Many medicines and drugs can make you bleed too much during surgery. Some change how well surgery (anesthesia) drugs work.  Call your insurance company to let them know you're having surgery. (If you don't have insurance, call [phone].)  Call your clinic if there's any change in your health. This includes a scrape or scratch near the surgery site, or any signs of a cold (sore throat, runny nose, cough, rash, fever).  Eating and drinking guidelines  For your safety: Unless your surgeon tells you otherwise, follow the guidelines below.  Eat and drink as normal until 8 hours before you arrive for surgery. After that, no food or milk. You can spit out gum when you arrive.  Drink clear liquids until 2 hours before you arrive. These are liquids you can see through, like water, Gatorade, and Propel Water. They also include plain black coffee and tea (no cream or milk).  No alcohol for 24 hours before you arrive. The night before surgery, stop any drinks that contain THC.  If your care team tells you to take medicine on the morning of surgery, it's okay to take it with a sip of water. No other medicines or drugs are allowed (including CBD oil)--follow your care team's instructions.  If you have questions the day of surgery, call your hospital or surgery center.   Preventing infection  Shower or bathe the night before and the morning of surgery. Follow the instructions your clinic gave you. (If no instructions, use regular soap.)  Don't shave or clip hair near your surgery site. We'll remove the hair if needed.  Don't smoke or vape the  morning of surgery. No chewing tobacco for 6 hours before you arrive. A nicotine patch is okay. You may spit out nicotine gum when you arrive.  For some surgeries, the surgeon will tell you to fully quit smoking and nicotine.  We will make every effort to keep you safe from infection. We will:  Clean our hands often with soap and water (or an alcohol-based hand rub).  Clean the skin at your surgery site with a special soap that kills germs.  Give you a special gown to keep you warm. (Cold raises the risk of infection.)  Wear hair covers, masks, gowns, and gloves during surgery.  Give antibiotic medicine, if prescribed. Not all surgeries need this medicine.  What to bring on the day of surgery  Photo ID and insurance card  Copy of your health care directive, if you have one  Glasses and hearing aids (bring cases)  You can't wear contacts during surgery  Inhaler and eye drops, if you use them (tell us about these when you arrive)  CPAP machine or breathing device, if you use them  A few personal items, if spending the night  If you have . . .  A pacemaker, ICD (cardiac defibrillator), or other implant: Bring the ID card.  An implanted stimulator: Bring the remote control.  A legal guardian: Bring a copy of the certified (court-stamped) guardianship papers.  Please remove any jewelry, including body piercings. Leave jewelry and other valuables at home.  If you're going home the day of surgery  You must have a support person drive you home. They should stay with you overnight, and they may need to help with your self-care.  If you don't have a support person, please tells us as soon as possible. We can help.  After surgery  If it's hard to control your pain or you need more pain medicine, please call your surgeon's office.  Questions?   If you have any questions for your care team, list them here:    ____________________________________________________________________________________________________________________________________________________________________________________________________________________________________________________________  For informational purposes only. Not to replace the advice of your health care provider. Copyright   2003, 2019 [facility]. All rights reserved. Clinically reviewed by Reg Chen MD. [employer] 821153 - REV 02/25.

## 2025-08-01 ENCOUNTER — LAB (OUTPATIENT)
Dept: LAB | Facility: CLINIC | Age: 75
End: 2025-08-01
Payer: COMMERCIAL

## 2025-08-01 LAB
CREAT UR-MCNC: 187 MG/DL
MICROALBUMIN UR-MCNC: <12 MG/L
MICROALBUMIN/CREAT UR: NORMAL MG/G{CREAT}

## 2025-08-01 PROCEDURE — 82570 ASSAY OF URINE CREATININE: CPT

## 2025-08-01 PROCEDURE — 82043 UR ALBUMIN QUANTITATIVE: CPT

## 2025-08-03 LAB
BACTERIA UR CULT: ABNORMAL

## 2025-08-04 ENCOUNTER — TELEPHONE (OUTPATIENT)
Dept: FAMILY MEDICINE | Facility: CLINIC | Age: 75
End: 2025-08-04
Payer: COMMERCIAL

## 2025-08-07 ENCOUNTER — TELEPHONE (OUTPATIENT)
Dept: UROLOGY | Facility: CLINIC | Age: 75
End: 2025-08-07
Payer: COMMERCIAL

## 2025-08-07 ENCOUNTER — TELEPHONE (OUTPATIENT)
Dept: SURGERY | Facility: CLINIC | Age: 75
End: 2025-08-07
Payer: COMMERCIAL

## 2025-08-07 DIAGNOSIS — N21.0 BLADDER STONE: Primary | ICD-10-CM

## 2025-08-07 RX ORDER — NITROFURANTOIN 25; 75 MG/1; MG/1
100 CAPSULE ORAL 2 TIMES DAILY
Qty: 10 CAPSULE | Refills: 0 | Status: SHIPPED | OUTPATIENT
Start: 2025-08-07 | End: 2025-08-12

## 2025-08-11 ENCOUNTER — ALLIED HEALTH/NURSE VISIT (OUTPATIENT)
Dept: FAMILY MEDICINE | Facility: CLINIC | Age: 75
End: 2025-08-11
Payer: COMMERCIAL

## 2025-08-11 DIAGNOSIS — Z71.89 INJECTION EDUCATION, ENCOUNTER FOR: Primary | ICD-10-CM

## 2025-08-11 PROCEDURE — 99207 PR NO CHARGE NURSE ONLY: CPT

## 2025-08-26 ENCOUNTER — ANESTHESIA EVENT (OUTPATIENT)
Dept: SURGERY | Facility: CLINIC | Age: 75
End: 2025-08-26
Payer: COMMERCIAL

## 2025-08-26 ASSESSMENT — LIFESTYLE VARIABLES: TOBACCO_USE: 1

## 2025-08-27 ENCOUNTER — HOSPITAL ENCOUNTER (OUTPATIENT)
Facility: CLINIC | Age: 75
Discharge: HOME | End: 2025-08-27
Attending: STUDENT IN AN ORGANIZED HEALTH CARE EDUCATION/TRAINING PROGRAM | Admitting: STUDENT IN AN ORGANIZED HEALTH CARE EDUCATION/TRAINING PROGRAM
Payer: COMMERCIAL

## 2025-08-27 ENCOUNTER — ANESTHESIA (OUTPATIENT)
Dept: SURGERY | Facility: CLINIC | Age: 75
End: 2025-08-27
Payer: COMMERCIAL

## 2025-08-27 PROCEDURE — 250N000009 HC RX 250

## 2025-08-27 PROCEDURE — 250N000011 HC RX IP 250 OP 636: Performed by: STUDENT IN AN ORGANIZED HEALTH CARE EDUCATION/TRAINING PROGRAM

## 2025-08-27 PROCEDURE — 250N000011 HC RX IP 250 OP 636

## 2025-08-27 PROCEDURE — 258N000003 HC RX IP 258 OP 636

## 2025-08-27 RX ORDER — FENTANYL CITRATE 50 UG/ML
INJECTION, SOLUTION INTRAMUSCULAR; INTRAVENOUS PRN
Status: DISCONTINUED | OUTPATIENT
Start: 2025-08-27 | End: 2025-08-27

## 2025-08-27 RX ORDER — PROPOFOL 10 MG/ML
INJECTION, EMULSION INTRAVENOUS CONTINUOUS PRN
Status: DISCONTINUED | OUTPATIENT
Start: 2025-08-27 | End: 2025-08-27

## 2025-08-27 RX ORDER — KETOROLAC TROMETHAMINE 30 MG/ML
INJECTION, SOLUTION INTRAMUSCULAR; INTRAVENOUS PRN
Status: DISCONTINUED | OUTPATIENT
Start: 2025-08-27 | End: 2025-08-27

## 2025-08-27 RX ORDER — KETAMINE HYDROCHLORIDE 10 MG/ML
INJECTION INTRAMUSCULAR; INTRAVENOUS PRN
Status: DISCONTINUED | OUTPATIENT
Start: 2025-08-27 | End: 2025-08-27

## 2025-08-27 RX ORDER — PROPOFOL 10 MG/ML
INJECTION, EMULSION INTRAVENOUS PRN
Status: DISCONTINUED | OUTPATIENT
Start: 2025-08-27 | End: 2025-08-27

## 2025-08-27 RX ORDER — DEXAMETHASONE SODIUM PHOSPHATE 4 MG/ML
INJECTION, SOLUTION INTRA-ARTICULAR; INTRALESIONAL; INTRAMUSCULAR; INTRAVENOUS; SOFT TISSUE PRN
Status: DISCONTINUED | OUTPATIENT
Start: 2025-08-27 | End: 2025-08-27

## 2025-08-27 RX ORDER — LIDOCAINE HYDROCHLORIDE 20 MG/ML
INJECTION, SOLUTION INFILTRATION; PERINEURAL PRN
Status: DISCONTINUED | OUTPATIENT
Start: 2025-08-27 | End: 2025-08-27

## 2025-08-27 RX ORDER — ONDANSETRON 2 MG/ML
INJECTION INTRAMUSCULAR; INTRAVENOUS PRN
Status: DISCONTINUED | OUTPATIENT
Start: 2025-08-27 | End: 2025-08-27

## 2025-08-27 RX ADMIN — KETAMINE HYDROCHLORIDE 10 MG: 10 INJECTION INTRAMUSCULAR; INTRAVENOUS at 12:25

## 2025-08-27 RX ADMIN — PROPOFOL 150 MCG/KG/MIN: 10 INJECTION, EMULSION INTRAVENOUS at 12:07

## 2025-08-27 RX ADMIN — ONDANSETRON 4 MG: 2 INJECTION INTRAMUSCULAR; INTRAVENOUS at 12:12

## 2025-08-27 RX ADMIN — KETOROLAC TROMETHAMINE 15 MG: 30 INJECTION, SOLUTION INTRAMUSCULAR at 12:31

## 2025-08-27 RX ADMIN — FENTANYL CITRATE 25 MCG: 50 INJECTION INTRAMUSCULAR; INTRAVENOUS at 12:29

## 2025-08-27 RX ADMIN — PHENYLEPHRINE HYDROCHLORIDE 150 MCG: 10 INJECTION INTRAVENOUS at 12:43

## 2025-08-27 RX ADMIN — DEXAMETHASONE SODIUM PHOSPHATE 4 MG: 4 INJECTION, SOLUTION INTRA-ARTICULAR; INTRALESIONAL; INTRAMUSCULAR; INTRAVENOUS; SOFT TISSUE at 12:12

## 2025-08-27 RX ADMIN — MIDAZOLAM 2 MG: 1 INJECTION INTRAMUSCULAR; INTRAVENOUS at 11:57

## 2025-08-27 RX ADMIN — KETAMINE HYDROCHLORIDE 10 MG: 10 INJECTION INTRAMUSCULAR; INTRAVENOUS at 12:07

## 2025-08-27 RX ADMIN — PHENYLEPHRINE HYDROCHLORIDE 150 MCG: 10 INJECTION INTRAVENOUS at 12:14

## 2025-08-27 RX ADMIN — LIDOCAINE HYDROCHLORIDE 60 MG: 20 INJECTION, SOLUTION INFILTRATION; PERINEURAL at 12:07

## 2025-08-27 RX ADMIN — AMPICILLIN SODIUM AND SULBACTAM SODIUM 3 G: 2; 1 INJECTION, POWDER, FOR SOLUTION INTRAMUSCULAR; INTRAVENOUS at 11:54

## 2025-08-27 RX ADMIN — PROPOFOL 150 MG: 10 INJECTION, EMULSION INTRAVENOUS at 12:07

## 2025-08-27 RX ADMIN — FENTANYL CITRATE 25 MCG: 50 INJECTION INTRAMUSCULAR; INTRAVENOUS at 12:27

## 2025-08-27 ASSESSMENT — ACTIVITIES OF DAILY LIVING (ADL)
ADLS_ACUITY_SCORE: 26
ADLS_ACUITY_SCORE: 26
ADLS_ACUITY_SCORE: 25
ADLS_ACUITY_SCORE: 25

## 2025-09-02 ENCOUNTER — ALLIED HEALTH/NURSE VISIT (OUTPATIENT)
Dept: FAMILY MEDICINE | Facility: CLINIC | Age: 75
End: 2025-09-02
Payer: COMMERCIAL

## 2025-09-02 DIAGNOSIS — Z71.9 ENCOUNTER FOR EDUCATION: Primary | ICD-10-CM

## 2025-09-02 PROCEDURE — 99207 PR NO CHARGE NURSE ONLY: CPT

## (undated) DEVICE — PAD CHUX UNDERPAD 23X24" 7136

## (undated) DEVICE — BAG DRAIN URO FOR SIEMENS 8MM ADAPTER NS CC164NS-A

## (undated) DEVICE — SOL WATER IRRIG 1000ML BOTTLE 2F7114

## (undated) DEVICE — TUBING SET PIRANHA 41702208

## (undated) DEVICE — FILTER PIRANHA DISP 2228.901

## (undated) DEVICE — DRAPE MAYO STAND 23X54 8337

## (undated) DEVICE — LASER FIBER HOLMIUM MOSES 550 D/F/L AC-10030120

## (undated) DEVICE — TUBING SUCTION MEDI-VAC SOFT 3/16"X20' N520A

## (undated) DEVICE — DECANTER BAG 2002S

## (undated) DEVICE — TUBING IRRIG TUR Y TYPE 96" LF 6543-01

## (undated) DEVICE — GLOVE BIOGEL SENSOR 7.0 LATEX

## (undated) DEVICE — SYR 20ML LL W/O NDL 302830

## (undated) DEVICE — SOL NACL 0.9% IRRIG 3000ML BAG 2B7477

## (undated) DEVICE — JELLY LUBRICATING SURGILUBE 4OZ TUBE

## (undated) DEVICE — Device

## (undated) DEVICE — LINEN TOWEL PACK X5 5464

## (undated) DEVICE — SYR 50ML LL W/O NDL 309653

## (undated) DEVICE — PACK TUR CUSTOM SBA15RUFSE

## (undated) DEVICE — SOL NACL 0.9% IRRIG 1000ML BOTTLE 2F7124

## (undated) DEVICE — CATH LASER URETERAL 7.1FRX40CM G17797  022403-7.1-40

## (undated) DEVICE — CATH FOLEY 3WAY 22FR 30ML LATEX 0167SI22

## (undated) DEVICE — SPECIMEN TRAP TISSUE CONTAINER PIRANHA 2208120

## (undated) DEVICE — DRAPE GYN/UROLOGY FLUID POUCH TUR 29455

## (undated) DEVICE — BLADE MORCELLATOR WOLF PIRANHA 4.75X385MM 49700103

## (undated) RX ORDER — GENTAMICIN SULFATE 80 MG/100ML
INJECTION, SOLUTION INTRAVENOUS
Status: DISPENSED
Start: 2024-09-16

## (undated) RX ORDER — ACETAMINOPHEN 325 MG/1
TABLET ORAL
Status: DISPENSED
Start: 2024-09-16

## (undated) RX ORDER — EPHEDRINE SULFATE 50 MG/ML
INJECTION, SOLUTION INTRAMUSCULAR; INTRAVENOUS; SUBCUTANEOUS
Status: DISPENSED
Start: 2024-09-16

## (undated) RX ORDER — ONDANSETRON 2 MG/ML
INJECTION INTRAMUSCULAR; INTRAVENOUS
Status: DISPENSED
Start: 2024-09-16

## (undated) RX ORDER — FENTANYL CITRATE 50 UG/ML
INJECTION, SOLUTION INTRAMUSCULAR; INTRAVENOUS
Status: DISPENSED
Start: 2024-09-16

## (undated) RX ORDER — PROPOFOL 10 MG/ML
INJECTION, EMULSION INTRAVENOUS
Status: DISPENSED
Start: 2024-09-16

## (undated) RX ORDER — DEXAMETHASONE SODIUM PHOSPHATE 4 MG/ML
INJECTION, SOLUTION INTRA-ARTICULAR; INTRALESIONAL; INTRAMUSCULAR; INTRAVENOUS; SOFT TISSUE
Status: DISPENSED
Start: 2024-09-16

## (undated) RX ORDER — AMPICILLIN 2 G/1
INJECTION, POWDER, FOR SOLUTION INTRAVENOUS
Status: DISPENSED
Start: 2024-09-16